# Patient Record
Sex: MALE | Employment: OTHER | URBAN - METROPOLITAN AREA
[De-identification: names, ages, dates, MRNs, and addresses within clinical notes are randomized per-mention and may not be internally consistent; named-entity substitution may affect disease eponyms.]

---

## 2023-10-17 ENCOUNTER — TELEPHONE (OUTPATIENT)
Dept: NEUROLOGY | Facility: CLINIC | Age: 68
End: 2023-10-17

## 2023-10-17 DIAGNOSIS — G20.A1 PARKINSON'S DISEASE WITHOUT DYSKINESIA OR FLUCTUATING MANIFESTATIONS (MULTI): Primary | ICD-10-CM

## 2023-10-19 RX ORDER — CARBIDOPA AND LEVODOPA 25; 250 MG/1; MG/1
1 TABLET ORAL 4 TIMES DAILY
COMMUNITY
End: 2023-10-19 | Stop reason: SDUPTHER

## 2023-10-19 RX ORDER — CARBIDOPA AND LEVODOPA 25; 250 MG/1; MG/1
1 TABLET ORAL
Qty: 540 TABLET | Refills: 1 | Status: SHIPPED | OUTPATIENT
Start: 2023-10-19 | End: 2024-04-16

## 2023-10-19 NOTE — TELEPHONE ENCOUNTER
1310 Pt reports carbi/levo is lasting only about four hours.  He takes it at 0600, 1000, 1400, 1830, 0000, and sometimes 0300 when he has to go to the bathroom.  Adjusted prescription to allow.    Chano Matthews Jr., M.D., FAAN

## 2023-12-07 ENCOUNTER — TELEPHONE (OUTPATIENT)
Dept: NEUROLOGY | Facility: CLINIC | Age: 68
End: 2023-12-07

## 2023-12-19 ENCOUNTER — TELEPHONE (OUTPATIENT)
Dept: NEUROLOGY | Facility: CLINIC | Age: 68
End: 2023-12-19

## 2023-12-19 DIAGNOSIS — G20.A1 PARKINSON'S DISEASE WITHOUT DYSKINESIA OR FLUCTUATING MANIFESTATIONS (MULTI): Primary | ICD-10-CM

## 2023-12-20 DIAGNOSIS — G20.A1 PARKINSON'S DISEASE WITHOUT DYSKINESIA OR FLUCTUATING MANIFESTATIONS (MULTI): ICD-10-CM

## 2023-12-20 RX ORDER — CARBIDOPA AND LEVODOPA 50; 200 MG/1; MG/1
1 TABLET, EXTENDED RELEASE ORAL 2 TIMES DAILY
Qty: 60 TABLET | Refills: 3 | Status: SHIPPED | OUTPATIENT
Start: 2023-12-20 | End: 2024-05-20 | Stop reason: ALTCHOICE

## 2023-12-26 RX ORDER — CARBIDOPA AND LEVODOPA 50; 200 MG/1; MG/1
TABLET, EXTENDED RELEASE ORAL
Qty: 180 TABLET | OUTPATIENT
Start: 2023-12-26

## 2024-04-13 DIAGNOSIS — G20.A1 PARKINSON'S DISEASE WITHOUT DYSKINESIA OR FLUCTUATING MANIFESTATIONS (MULTI): ICD-10-CM

## 2024-04-16 RX ORDER — CARBIDOPA AND LEVODOPA 25; 250 MG/1; MG/1
TABLET ORAL
Qty: 540 TABLET | Refills: 3 | Status: SHIPPED | OUTPATIENT
Start: 2024-04-16 | End: 2024-05-20 | Stop reason: SDUPTHER

## 2024-05-20 ENCOUNTER — OFFICE VISIT (OUTPATIENT)
Dept: NEUROLOGY | Facility: CLINIC | Age: 69
End: 2024-05-20
Payer: MEDICARE

## 2024-05-20 VITALS
HEART RATE: 74 BPM | BODY MASS INDEX: 25.61 KG/M2 | TEMPERATURE: 97.3 F | DIASTOLIC BLOOD PRESSURE: 69 MMHG | SYSTOLIC BLOOD PRESSURE: 121 MMHG | WEIGHT: 150 LBS | HEIGHT: 64 IN

## 2024-05-20 DIAGNOSIS — G20.A1 PARKINSON'S DISEASE WITHOUT DYSKINESIA OR FLUCTUATING MANIFESTATIONS (MULTI): Primary | ICD-10-CM

## 2024-05-20 PROCEDURE — 1159F MED LIST DOCD IN RCRD: CPT | Performed by: PSYCHIATRY & NEUROLOGY

## 2024-05-20 PROCEDURE — 99213 OFFICE O/P EST LOW 20 MIN: CPT | Performed by: PSYCHIATRY & NEUROLOGY

## 2024-05-20 PROCEDURE — 1036F TOBACCO NON-USER: CPT | Performed by: PSYCHIATRY & NEUROLOGY

## 2024-05-20 RX ORDER — ENTACAPONE 200 MG/1
200 TABLET ORAL 4 TIMES DAILY
Qty: 360 TABLET | Refills: 3 | Status: SHIPPED | OUTPATIENT
Start: 2024-05-20 | End: 2025-05-20

## 2024-05-20 RX ORDER — ATORVASTATIN CALCIUM 10 MG/1
TABLET, FILM COATED ORAL
COMMUNITY

## 2024-05-20 RX ORDER — CARBIDOPA AND LEVODOPA 25; 250 MG/1; MG/1
1 TABLET ORAL 4 TIMES DAILY
Qty: 360 TABLET | Refills: 3 | Status: SHIPPED | OUTPATIENT
Start: 2024-05-20 | End: 2025-05-20

## 2024-05-20 NOTE — PROGRESS NOTES
"NEUROLOGY OUTPATIENT FOLLOW-UP NOTE    Assessment/Plan   Diagnoses and all orders for this visit:  Parkinson's disease without dyskinesia or fluctuating manifestations (Multi)  -     Referral to Neurology; Future      IMPRESSION: Parkinson's disease.    PLAN:  I added entacapone 200 mg to the carbidopa/levodopa 25/250, four times daily.  I referred him to Dr. Kunz for an opinion as to whether he'd be a candidate for DBS.  I will see him again in 3-4 months or prn.      hCano Matthews Jr., M.D., FAAN   ----------    Subjective     Hayden Hester is a 68 y.o. year old male here for follow-up.  His wife accompanies him today.    Effect of medication lasts about three hours.  I tried changing the morning dose of carbidopa/levodopa to continuous release and he reported no additional effect from it, so he went back to the regular release carbidopa/levodopa.    He takes carbidopa/levodopa 25/250 at 0600 1000 1400 1800    He is wondering whether he is a candidate for DBS.    Past Medical History:   Diagnosis Date    Hyperlipidemia, unspecified     Diet-controlled hyperlipidemia     Past Surgical History:   Procedure Laterality Date    HERNIA REPAIR  08/08/2018    Inguinal Hernia Repair     Social History     Tobacco Use    Smoking status: Former     Types: Cigarettes    Smokeless tobacco: Never   Substance Use Topics    Alcohol use: Not Currently     family history is not on file.    Current Outpatient Medications:     atorvastatin (Lipitor) 10 mg tablet, Take by mouth., Disp: , Rfl:     carbidopa-levodopa (Sinemet CR)  mg ER tablet, Take 1 tablet by mouth 2 times a day. Do not crush or chew., Disp: 60 tablet, Rfl: 3    carbidopa-levodopa (Sinemet)  mg tablet, TAKE 1 TABLET BY MOUTH SIX TIMES A DAY, Disp: 540 tablet, Rfl: 3  No Known Allergies    Objective     /69   Pulse 74   Temp 36.3 °C (97.3 °F)   Ht 1.626 m (5' 4\")   Wt 68 kg (150 lb)   BMI 25.75 kg/m²     CONSTITUTIONAL:  No acute " distress    MENTAL STATUS:  Awake, alert, fully oriented to self, place, and time, with present short-term memory, good awareness of recent events, normal attention span, concentration, and fund of knowledge.    SPEECH AND LANGUAGE:  Can name and repeat, follows all commands, has no dysarthria    CRANIAL NERVES:  II-Vision present, visual fields full to confrontational testing    III/IV/VI--EOMs are present in all directions.  Pupils are symmetrically reactive in dim light.  No ptosis.    V--Normal facial sensation.  Mild jaw tremor.    VII--No facial asymmetry.  Minimal masked facies.    VIII--Hearing present to voice bilaterally.    IX/X--Symmetric soft palate rise.    XI--Normal trapezius power bilaterally.    XII--Tongue protrudes without deviation.    MOTOR:  Normal power and bulk in both arms and both legs.  Hypertonia of all four extremities.  Rest tremor of both arms, worse on the right.  Cogwheeling at both wrists.    SENSORY:  Normal pin sensation in both arms and both legs without distal-proximal gradient, asymmetry, or spinal sensory level.    COORDINATION:  Normal finger-to-nose and heel-to-shin testing in both arms and both legs.    REFLEXES are normal and symmetric at the biceps, triceps, brachioradialis, patella, and ankle.  The plantar responses are flexor.    GAIT is normal, without steppage, ataxia, shuffling, or spasticity.  He takes three steps to turn around.  He has increased right arm tremor when walking.      Chano Matthews Jr., M.D., FAAN

## 2024-05-29 ENCOUNTER — OFFICE VISIT (OUTPATIENT)
Dept: NEUROLOGY | Facility: CLINIC | Age: 69
End: 2024-05-29
Payer: MEDICARE

## 2024-05-29 VITALS
WEIGHT: 150.2 LBS | DIASTOLIC BLOOD PRESSURE: 81 MMHG | RESPIRATION RATE: 20 BRPM | BODY MASS INDEX: 23.57 KG/M2 | HEIGHT: 67 IN | TEMPERATURE: 97.5 F | SYSTOLIC BLOOD PRESSURE: 131 MMHG | HEART RATE: 63 BPM

## 2024-05-29 DIAGNOSIS — G20.A1 PARKINSON'S DISEASE WITHOUT DYSKINESIA OR FLUCTUATING MANIFESTATIONS (MULTI): ICD-10-CM

## 2024-05-29 PROCEDURE — 1036F TOBACCO NON-USER: CPT | Performed by: PSYCHIATRY & NEUROLOGY

## 2024-05-29 PROCEDURE — 1160F RVW MEDS BY RX/DR IN RCRD: CPT | Performed by: PSYCHIATRY & NEUROLOGY

## 2024-05-29 PROCEDURE — 99214 OFFICE O/P EST MOD 30 MIN: CPT | Performed by: PSYCHIATRY & NEUROLOGY

## 2024-05-29 PROCEDURE — 1159F MED LIST DOCD IN RCRD: CPT | Performed by: PSYCHIATRY & NEUROLOGY

## 2024-05-29 RX ORDER — CARBIDOPA AND LEVODOPA 25; 100 MG/1; MG/1
TABLET, EXTENDED RELEASE ORAL
Qty: 180 TABLET | Refills: 3 | Status: SHIPPED | OUTPATIENT
Start: 2024-05-29

## 2024-05-29 RX ORDER — LEVODOPA 42 MG/1
CAPSULE RESPIRATORY (INHALATION)
Qty: 60 CAPSULE | Refills: 3 | Status: SHIPPED | OUTPATIENT
Start: 2024-05-29

## 2024-05-29 ASSESSMENT — ENCOUNTER SYMPTOMS: OCCASIONAL FEELINGS OF UNSTEADINESS: 1

## 2024-05-29 NOTE — PATIENT INSTRUCTIONS
"It was a pleasure seeing you today.         For any urgent issues or needing to speak to a medical assistant please call [phone], option 6 during our office hours Monday-Friday 8am-4pm, and leave a voicemail with your concern.  My office will try to reach back you as soon as possible within 24 (business) hours.  If you have an emergency please call 911 or visit a local urgent care or nearest emergency room.      Please understand that [employer] is a useful communication tool for simple \"normal\" results or a refill request but I would not recommend using this tool for emergent or urgent issues or for conversations with me.  I am happy to ask my staff to rearrange a follow up visit or a virtual visit sooner than requested if appropriate for your care.    "

## 2024-05-29 NOTE — PROGRESS NOTES
"Consultation:   Subjective     Hayden Hester is a 69 y.o. year old right-handed male here for DBS consult/ parkinsonism.  Referred by Dr. Matthews.   HPI    Arrives with wife who provides history.  Right hand tremor started in 2015.  He is still bothered by his right hand tremor.    Current PD meds:  Sinemet 25-250mg 1 tab 6 x daily ( 6am/ 10am/ 2pm/ 6pm/10am/ 2am)  Entacapone 200mg 1 tab QID -recently added-not sure if it is helping.    Meds last 3-4 hours.  Sometimes feels the dose is not helping at all for tremor.   Walking is an issue.  He has fallen, while walking upstairs not   Memory is good.  No hallucinations.  Mild dyskinesia in legs but do not always occur after levodopa versus pff time  Has freezing of gait at times.   His voice is soft.  No trouble swallowing.    No FH of tremor or PD.       Past meds :   Ropinirole - helped slightly in 2018, changed to levodopa in 2019.   Review of Systems    There is no problem list on file for this patient.    Past Medical History:   Diagnosis Date    Hyperlipidemia, unspecified     Diet-controlled hyperlipidemia     Past Surgical History:   Procedure Laterality Date    HERNIA REPAIR  08/08/2018    Inguinal Hernia Repair     Social History     Tobacco Use    Smoking status: Former     Types: Cigarettes    Smokeless tobacco: Never   Substance Use Topics    Alcohol use: Not Currently     family history is not on file.    Current Outpatient Medications:     atorvastatin (Lipitor) 10 mg tablet, Take by mouth., Disp: , Rfl:     carbidopa-levodopa (Sinemet)  mg tablet, Take 1 tablet by mouth 4 times a day., Disp: 360 tablet, Rfl: 3    entacapone (Comtan) 200 mg tablet, Take 1 tablet (200 mg) by mouth 4 times a day. Take with carbidopa/levodopa, Disp: 360 tablet, Rfl: 3  No Known Allergies  /81 (BP Location: Right arm)   Pulse 63   Temp 36.4 °C (97.5 °F)   Resp 20   Ht 1.702 m (5' 7\")   Wt 68.1 kg (150 lb 3.2 oz)   BMI 23.52 kg/m²   Neurological " Exam/Physical Exam:    Constitutional: General appearance: no acute distress. Pleasant.   Auscultation of Heart: Regular rate and rhythm, no murmurs, normal S1 and S2.   Carotid Arteries: no bruits  Peripheral Vascular Exam: No swelling, edema or tenderness to palpation in extremities  Mental status: no distress, alert, interactive and cooperative. Affect is appropriate.   Mild hypophonia  Orientation: oriented to person, oriented to place and oriented to time.   Memory: recent memory intact and remote memory intact.   Attention: normal attention /concentration.   Eyes: The ophthalmoscopic examination was normal.   Cranial nerve II: Visual fields full to confrontation.   Cranial nerves III, IV, and VI: Pupils round, equally reactive to light; EOMs intact. No nystagmus.   Cranial Nerve V: Facial sensation intact to LT bilaterally.   Cranial nerve VII: no facial droop  Cranial nerve VIII: Hearing is intact  Cranial nerves IX and X: Palate elevates symmetrically.   Cranial nerve XI: Shoulder shrug intact.   Cranial nerve XII: Tongue is midline.  Motor:  Muscle bulk was normal in both upper and lower extremities.    No atrophy.   Strength is normal.  Resting tremor of the RUE- moderate.  There is severe bradykinesia on the right side and moderate bradykinesia on left.  He has mild rigidity in all ext.  Deep Tendon Reflexes: left biceps 2+ , right biceps 2+, left triceps 2+, right triceps 2+, left brachioradialis 2+, right brachioradialis 2+, left patella 2+, right patella 2+, left ankle jerk 2+, right ankle jerk 2+   Plantar Reflex: Toes downgoing to plantar stimulation on the left. Toes downgoing to plantar stimulation on the right.   Sensory Exam: Normal to vibratory sensation  Coordination:  no limb dystaxia  Gait:  cautious.  Scoliosis and mild anterocollis.  Right leg slightly dragging.  Slow initiation of gait.  Right arm swing decreased with R hand tremor present.          Assessment/Plan   Problem List Items  Addressed This Visit    None  Visit Diagnoses         Codes    Parkinson's disease without dyskinesia or fluctuating manifestations (Multi)     G20.A1        Would be a good candidate b/l STN DBS, help tremor, motor fluctuations, dyskinesia.  Will have him set up for DBS eval/ appointments.    Stop the 10pm Sinemet and 2am dose of levodopa and trial Sinemet 25-100mg ER take 1 tab at bedtime ( 10pm) -I would recommend doing that for 2 weeks and if not helping he can increase  Inbrija is an option -will try this now for off phases vs. Changing Sinemet to Rytary.

## 2024-06-18 NOTE — PROGRESS NOTES
Subjective     Hayden Hester is a 69 y.o. year old male who presents with Parkinson's Disease, here for follow up visit.    HPI  Patient is here for DBS educational visit for PD, he is a patient of Dr. Wilson.    He comes with his wife Li.                        Current Outpatient Medications:     atorvastatin (Lipitor) 10 mg tablet, Take by mouth., Disp: , Rfl:     carbidopa-levodopa (Sinemet CR)  mg ER tablet, Take 1 tab at 10pm bedtime x 2 weeks if needed take 2 tabs at bedtime, Disp: 180 tablet, Rfl: 3    carbidopa-levodopa (Sinemet)  mg tablet, Take 1 tablet by mouth 4 times a day., Disp: 360 tablet, Rfl: 3    entacapone (Comtan) 200 mg tablet, Take 1 tablet (200 mg) by mouth 4 times a day. Take with carbidopa/levodopa, Disp: 360 tablet, Rfl: 3    levodopa (Inbrija) 42 mg capsule, w/inhalation device, Take 2 caps inhalation prn for off phases up to 5 times daily, Disp: 60 capsule, Rfl: 3       Objective   There were no vitals filed for this visit.              Physical Exam  Awake, alert, asking questions appropriately.     Assessment/Plan   Mr. Hayden Hester is a 69 y.o. M with Parkinson;s disease who presented for Deep Brain Stimulation educational visit. Today we reviewed a PowerPoint on DBS to discuss risks, benefits, treatment plan and followup required that may take 6 to 12 months to achieve stable benefit, also showed images of the lead and battery, discussed the 3 types of systems available and allowed the patient to ask any questions-summary in patient AVS. All questions were answered.    Diagnoses and all orders for this visit:  Parkinson's disease without dyskinesia or fluctuating manifestations (Multi)  -     Referral to Neurosurgery; Future  -     Referral to Neuropsychology; Future        #Consult to neuropsychology for memory testing and neurosurgery for DBS eval    Deep Brain Stimulation (DBS) helps treat the motor symptoms of Parkinson's disease (including tremor, slowness,  stiffness, motor fluctuations).We discussed freezing of gait may not respond, or only temporarily, as the disease progresses can worsen. It can also help with night time mobility as the therapy is on 24 hours a day, and therefore can help with sleep. It does not treat non-motor symptoms (such as anxiety, depression, urinary issues, sleep problems, cognitive decline, balance problems, speech, swallowing, etc). In fact, it can worsen balance/falls, cognition/memory/thinking, and speech - if these are major issues, then DBS may not be the best treatment. After DBS surgery, people are usually able to reduce (though not eliminate) their Parkinson's medications. It may take up to 6-12 months to optimize DBS settings after surgery.    We discussed possible risks including stroke and infection in the will-operative period, but please plan to discuss these risks in more detail with our neurosurgeon. Reversible stimulation induced side effects include tingling and pulling from spread of stimulation to surrounding areas. We discussed possible cognitive side effects including word finding difficulties as well as executive dysfunction (attention,recall, multitasking).    We discussed that DBS is not a cure, but helps the motor symptoms, and we also discussed that it is an on going therapy with frequent visits.    -The DBS will help with: slowness, stiffness, tremor, shuffling, motor fluctuations (due to medications wearing off), dyskinesias (wiggly movements)  -The DBS will NOT help with: cognition, urinary issues, mood, hallucinations, and certain midline symptoms such as postural instability, balance problems, freezing of gait, speech       There are the steps needed prior to getting DBS (in no particular order):  Neuropsychological testing (several hours of cognitive testing to evaluate for mild cognitive impairment or dementia)  Levodopa response testing in clinic (neurologic exam 12hrs OFF of Parkinson's meds and 1hr after  taking Parkinson's medications)  Consultation with the neurosurgeon     After above is completed, your case will be discussed at our bi-monthly DBS meeting and we will let you know if you are a candidate and set up the steps if you would like to proceed.         More information about DBS can be found online at:   - www.parkinson.org/sites/default/files/Guide_to_DBS_Stimulation_Therapy.pdf   - www.ninds.nih.gov/Disorders/All-Disorders/Deep-Brain-Stimulation-Parkinsons-Disease-Information-Page     #Follow up for levodopa response testing. Come AT LEAST 12 hrs off of all Parkinson's medication (OK to take other meds), and bring your medications with you (I may have you take higher dose so please bring extra).   You will be assessed OFF medication and then you will take your meds in the office and will be re-assessed 1hr later to see how you respond.     Also need new patient apt for DBS eval with Dr. Saenz here at [facility]---our office will reach out.     I, MADDISON Mckeon, personally performed discussion of care/treatment options reviewing a powerpoint on DBS as above, taking a total time of 41 minutes for today's visit.    Young Mckeon NP-C  Adult/Gerontological Nurse Practitioner   [facility], Department of Neurology  Neurological [facility]  [address] [place] [address]  Phone: [phone]  Fax: [phone]

## 2024-06-19 ENCOUNTER — APPOINTMENT (OUTPATIENT)
Dept: NEUROLOGY | Facility: CLINIC | Age: 69
End: 2024-06-19
Payer: MEDICARE

## 2024-06-19 DIAGNOSIS — G20.A1 PARKINSON'S DISEASE WITHOUT DYSKINESIA OR FLUCTUATING MANIFESTATIONS (MULTI): Primary | ICD-10-CM

## 2024-06-19 PROCEDURE — 1160F RVW MEDS BY RX/DR IN RCRD: CPT | Performed by: NURSE PRACTITIONER

## 2024-06-19 PROCEDURE — 1036F TOBACCO NON-USER: CPT | Performed by: NURSE PRACTITIONER

## 2024-06-19 PROCEDURE — 99215 OFFICE O/P EST HI 40 MIN: CPT | Performed by: NURSE PRACTITIONER

## 2024-06-19 PROCEDURE — 1159F MED LIST DOCD IN RCRD: CPT | Performed by: NURSE PRACTITIONER

## 2024-06-19 NOTE — PATIENT INSTRUCTIONS
#Consult to neuropsychology for memory testing and neurosurgery for DBS eval    Deep Brain Stimulation (DBS) helps treat the motor symptoms of Parkinson's disease (including tremor, slowness, stiffness, motor fluctuations).We discussed freezing of gait may not respond, or only temporarily, as the disease progresses can worsen. It can also help with night time mobility as the therapy is on 24 hours a day, and therefore can help with sleep. It does not treat non-motor symptoms (such as anxiety, depression, urinary issues, sleep problems, cognitive decline, balance problems, speech, swallowing, etc). In fact, it can worsen balance/falls, cognition/memory/thinking, and speech - if these are major issues, then DBS may not be the best treatment. After DBS surgery, people are usually able to reduce (though not eliminate) their Parkinson's medications. It may take up to 6-12 months to optimize DBS settings after surgery.    We discussed possible risks including stroke and infection in the will-operative period, but please plan to discuss these risks in more detail with our neurosurgeon. Reversible stimulation induced side effects include tingling and pulling from spread of stimulation to surrounding areas. We discussed possible cognitive side effects including word finding difficulties as well as executive dysfunction (attention,recall, multitasking).    We discussed that DBS is not a cure, but helps the motor symptoms, and we also discussed that it is an on going therapy with frequent visits.    -The DBS will help with: slowness, stiffness, tremor, shuffling, motor fluctuations (due to medications wearing off), dyskinesias (wiggly movements)  -The DBS will NOT help with: cognition, urinary issues, mood, hallucinations, and certain midline symptoms such as postural instability, balance problems, freezing of gait, speech       There are the steps needed prior to getting DBS (in no particular order):  Neuropsychological  testing (several hours of cognitive testing to evaluate for mild cognitive impairment or dementia)  Levodopa response testing in clinic (neurologic exam 12hrs OFF of Parkinson's meds and 1hr after taking Parkinson's medications)  Consultation with the neurosurgeon     After above is completed, your case will be discussed at our bi-monthly DBS meeting and we will let you know if you are a candidate and set up the steps if you would like to proceed.         More information about DBS can be found online at:   - www.parkinson.org/sites/default/files/Guide_to_DBS_Stimulation_Therapy.pdf   - www.ninds.nih.gov/Disorders/All-Disorders/Deep-Brain-Stimulation-Parkinsons-Disease-Information-Page     #Follow up for levodopa response testing. Come AT LEAST 12 hrs off of all Parkinson's medication (OK to take other meds), and bring your medications with you (I may have you take higher dose so please bring extra).   You will be assessed OFF medication and then you will take your meds in the office and will be re-assessed 1hr later to see how you respond.     Also need new patient apt for DBS eval with Dr. Saenz here at [facility]---our office will reach out.       Young Mckeon, NP-C  Adult/Gerontological Nurse Practitioner   [facility], Department of Neurology  Neurological [facility]  [address] [place] [address]  Phone: [phone]  Fax: [phone]

## 2024-06-25 ENCOUNTER — TELEPHONE (OUTPATIENT)
Dept: NEUROSURGERY | Facility: HOSPITAL | Age: 69
End: 2024-06-25
Payer: MEDICARE

## 2024-06-25 NOTE — TELEPHONE ENCOUNTER
Called ptEDELMIRA on VM that we have a cancellation for tomorrow morning at 8am but would need a call back asap to secure that appt.  Othewise, please call back to schedule a future appt.  Left office # to call back.

## 2024-06-27 NOTE — PROGRESS NOTES
[facility]   Neurosurgery    Diagnosis  {There are no diagnoses linked to this encounter. (Refresh or delete this SmartLink)}    Patient Discussion/Summary      Provider Impressions      History of Present Illness  Chief Complaint: No chief complaint on file.        HPI: Hayden Hester is a 69 y.o. male ***      ROS: A complete 11 system ROS was performed (constitutional, eyes, ENT, cardiovascular, respiratory, GI, , musculoskeletal, skin, neurological, and psychiatric) and was negative aside from the pertinent positives and negatives noted in the HPI.      Previous History  Past Medical History:   Diagnosis Date    Hyperlipidemia, unspecified     Diet-controlled hyperlipidemia     Past Surgical History:   Procedure Laterality Date    HERNIA REPAIR  08/08/2018    Inguinal Hernia Repair     Social History     Tobacco Use    Smoking status: Former     Types: Cigarettes    Smokeless tobacco: Never   Vaping Use    Vaping status: Never Used   Substance Use Topics    Alcohol use: Not Currently    Drug use: Never     No family history on file.  No Known Allergies  Current Outpatient Medications   Medication Instructions    atorvastatin (Lipitor) 10 mg tablet oral    carbidopa-levodopa (Sinemet CR)  mg ER tablet Take 1 tab at 10pm bedtime x 2 weeks if needed take 2 tabs at bedtime    carbidopa-levodopa (Sinemet)  mg tablet 1 tablet, oral, 4 times daily    entacapone (COMTAN) 200 mg, oral, 4 times daily, Take with carbidopa/levodopa    levodopa (Inbrija) 42 mg capsule, w/inhalation device Take 2 caps inhalation prn for off phases up to 5 times daily         Vitals  There were no vitals taken for this visit.      Physical Exam  Constitutional: Well appearing. No acute distress.   Musculoskeletal: No visible deformity of joints and nails. Normal ROM.  Orientation: Oriented to self, place, and time  Language: Fluid speech and intact cognition  CN 2: Normal   CN 3, 4, and 6: Normal   CN 5: Normal   CN 7:  Normal   CN 8: Normal   CN 9 and 10: Normal   CN 11: Normal   CN 12: Normal   Muscle strength: 5/5 strength both UE and LE.  Muscle tone: No atrophy, abnormal movements, flaccidity, cogwheeling or spasticity.   Gait and station: Non-antalgic and not broad-based. No shuffling steps.  Sensation: Grossly intact throughout all dermatomes. No allodynia.   Reflexes: Symmetric and normal deep tendon reflexes throughout. Negative Moise's sign. No clonus at ankles.    Coordination: No dysmetria.  Mood and Affect: Appropriate mood and affect.   Involuntary Movements:   Radicular Testing:       Results

## 2024-06-28 ENCOUNTER — OFFICE VISIT (OUTPATIENT)
Dept: NEUROSURGERY | Facility: CLINIC | Age: 69
End: 2024-06-28
Payer: MEDICARE

## 2024-06-28 ENCOUNTER — APPOINTMENT (OUTPATIENT)
Dept: NEUROSURGERY | Facility: CLINIC | Age: 69
End: 2024-06-28
Payer: MEDICARE

## 2024-06-28 VITALS
WEIGHT: 160 LBS | HEIGHT: 67 IN | TEMPERATURE: 97.5 F | BODY MASS INDEX: 25.11 KG/M2 | DIASTOLIC BLOOD PRESSURE: 80 MMHG | SYSTOLIC BLOOD PRESSURE: 128 MMHG | HEART RATE: 74 BPM

## 2024-06-28 DIAGNOSIS — G20.B2 PARKINSON'S DISEASE WITH DYSKINESIA AND FLUCTUATING MANIFESTATIONS (MULTI): Primary | ICD-10-CM

## 2024-06-28 PROCEDURE — 1036F TOBACCO NON-USER: CPT | Performed by: NEUROLOGICAL SURGERY

## 2024-06-28 PROCEDURE — 99205 OFFICE O/P NEW HI 60 MIN: CPT | Performed by: NEUROLOGICAL SURGERY

## 2024-06-28 PROCEDURE — 1159F MED LIST DOCD IN RCRD: CPT | Performed by: NEUROLOGICAL SURGERY

## 2024-06-28 PROCEDURE — 99215 OFFICE O/P EST HI 40 MIN: CPT | Performed by: NEUROLOGICAL SURGERY

## 2024-06-28 PROCEDURE — 1126F AMNT PAIN NOTED NONE PRSNT: CPT | Performed by: NEUROLOGICAL SURGERY

## 2024-06-28 RX ORDER — LORAZEPAM 1 MG/1
TABLET ORAL
Qty: 2 TABLET | Refills: 0 | Status: SHIPPED | OUTPATIENT
Start: 2024-06-28

## 2024-06-28 ASSESSMENT — PAIN SCALES - GENERAL: PAINLEVEL: 0-NO PAIN

## 2024-06-28 NOTE — PROGRESS NOTES
[facility]   Neurosurgery    Diagnosis  Hayden was seen today for new patient visit.  Diagnoses and all orders for this visit:  Parkinson's disease without dyskinesia or fluctuating manifestations (Multi)  -     Referral to Neurosurgery      Patient Discussion/Summary  1) We discussed Deep brain stimulation (DBS) surgery at length, as well as the associated risks and benefits. We discussed the different methods of performing the surgery, as well as the different types of devices commonly used. We also discussed surgical targets in the brain with significant evidence supporting their efficacy.    2) Prior to surgery, you will need a neuropsychology evaluation, levadopa response testing, and a volumetric MRI, which needs to be done at either [facility] or [facility]. You may need to take a dose of Ativan prior to the MRI to minimize head/body movement.     3) Your case will then be presented at our multi-disciplinary care conference. At this conference, our team of neurologists, neurosurgeons, neuropsychologists, and advanced practice nurses will review and discuss the levodopa response testing, neuropsychological results, neurological evaluations, and the high resolution structural MRI. A treatment plan will be developed and tailored to your specific needs. You will subsequently be notified of the recommendations of the group.    4) If we decide to proceed to surgery, we will also need a volumetric head CT and an evaluation by our will-operative anesthesia team.    5) For more information on DBS in general, please refer to the National Parkinson Foundation website, www.parkinson.org, which has a booklet of information on the surgery. Similar information can also be found on the Parkinson Disease Foundation website.     Provider Impressions  70yo R-handed M with PD, sx of RUE tremor, bradykinesia, rigidity, and motor fluctuations. Good candidate for DBS, likely STN. Still needs LRT and  NP.     History of Present Illness  Chief Complaint:   Chief Complaint   Patient presents with    New Patient Visit         HPI: Hayden Hester is a 69 y.o. male right-handed M with PD since 2015, with symptoms primarily of tremor in right hand, as well as dyskinesias, motor fluctuations, and gait instability; referred by Dr. Kunz and MADDISON Vidal, for neurosurgical evaluation and to discuss DBS surgery, consider b/l STN DBS. Patient is scheduled for LRT with Young on 8/12, and for neuropsychology with Dr. Sandoval on 8/19.      Patient presents today with his wife. Patient reports first noticing right hand tremor ~2016/2017 while he was working on his faucet. Patient lived with this for a couple of months, but was then referred to Dr. Matthews where he was diagnosed with PD and initiated on medication. Patient reports that the first medication was not helpful, but was then switched to C/L with better results. Patient reports he does well with medication, but is experiencing increasing wearing off between doses. Patient's wife also recently noted occas mild dyskinesias in the b/l legs while on dosing, which has been over the last year. Patient is also bothered by bradykinesia, shuffling gait, and difficulty turning, which is progressively worsening. Patient denies imbalance or fear of falls. Patient denies difficulty swallowing or GI issues. Patient denies any significant history of anxiety or depression.     Goals for surgery, patient primarily would like to address tremor, and would also like to improve movement/bradykinesia.       ROS: A complete 11 system ROS was performed (constitutional, eyes, ENT, cardiovascular, respiratory, GI, , musculoskeletal, skin, neurological, and psychiatric) and was negative aside from the pertinent positives and negatives noted in the HPI.      Previous History  Past Medical History:   Diagnosis Date    Hyperlipidemia, unspecified     Diet-controlled hyperlipidemia     Past Surgical  "History:   Procedure Laterality Date    HERNIA REPAIR  08/08/2018    Inguinal Hernia Repair     Social History     Tobacco Use    Smoking status: Former     Types: Cigarettes    Smokeless tobacco: Never   Vaping Use    Vaping status: Never Used   Substance Use Topics    Alcohol use: Not Currently    Drug use: Never     No family history on file.  No Known Allergies  Current Outpatient Medications   Medication Instructions    atorvastatin (Lipitor) 10 mg tablet oral    carbidopa-levodopa (Sinemet CR)  mg ER tablet Take 1 tab at 10pm bedtime x 2 weeks if needed take 2 tabs at bedtime    carbidopa-levodopa (Sinemet)  mg tablet 1 tablet, oral, 4 times daily    entacapone (COMTAN) 200 mg, oral, 4 times daily, Take with carbidopa/levodopa    levodopa (Inbrija) 42 mg capsule, w/inhalation device Take 2 caps inhalation prn for off phases up to 5 times daily         Vitals  /80   Pulse 74   Temp 36.4 °C (97.5 °F)   Ht 1.702 m (5' 7\")   Wt 72.6 kg (160 lb)   BMI 25.06 kg/m²       Physical Exam  Constitutional: Well appearing. No acute distress.   Musculoskeletal: No visible deformity of joints and nails. Normal ROM.  Orientation: Oriented to self, place, and time  Language: Fluid speech and intact cognition  CN 2: Normal   CN 3, 4, and 6: Normal   CN 5: Normal   CN 7: Normal   CN 8: Normal   CN 9 and 10: Normal   CN 11: Normal   CN 12: Normal   Muscle strength: 5/5 strength both UE and LE.  Muscle tone: Rigidity RUE>LUE, no notable rigidity in LE.  Gait and station: Mild shuffling and stooped gait. Absent right arm swing (patient between dosing and has not yet taken 11am dose).  Sensation: Grossly intact throughout all dermatomes. No allodynia.   Reflexes: Symmetric and normal deep tendon reflexes throughout. Negative Moise's sign. No clonus at ankles.    Coordination: Diminished finger taps and fine motor coordination.   Mood and Affect: Appropriate mood and affect.   Involuntary Movements: Notable " resting R hand tremor and moderate R action tremor; mild left hand tremor with action, chin tremor. No dyskinesias noted on exam (patient between dosing and had not yet taken 11am dose).        Results

## 2024-07-23 ENCOUNTER — TELEPHONE (OUTPATIENT)
Dept: PSYCHOLOGY | Facility: CLINIC | Age: 69
End: 2024-07-23
Payer: MEDICARE

## 2024-08-12 ENCOUNTER — APPOINTMENT (OUTPATIENT)
Dept: NEUROLOGY | Facility: CLINIC | Age: 69
End: 2024-08-12
Payer: MEDICARE

## 2024-08-12 DIAGNOSIS — G20.B2 PARKINSON'S DISEASE WITH DYSKINESIA AND FLUCTUATING MANIFESTATIONS (MULTI): Primary | ICD-10-CM

## 2024-08-12 PROCEDURE — 99214 OFFICE O/P EST MOD 30 MIN: CPT | Performed by: NURSE PRACTITIONER

## 2024-08-12 NOTE — PROGRESS NOTES
"Subjective     Hayden Hester is a 69 y.o. year old male who presents with Parkinson's Disease, here for follow up visit.    HPI  Patient is here for LRT and could not tolerate being off meds from 8pm so took meds 2 hrs ago (545am). Took 30mins to get to the bathroom, very unsafe, a lot of FOG, 5 mins to even turn.     He comes with his wife Li.       MOTOR SYMPTOMS      +/-                            Comments  Motor sx overall  Slowly worsening   Tremor + Main issue in R hand, some jaw   Rigidity +    Bradykinesia +    Gait difficulty + Slow to get up and falls back into chair the past year or more. Shuffling when off.   FOG + When meds wear off   Falls + Once in May carrying a laptop and glass of water in his hand   PT -    Exercise -             [facility] Meds  Med Dose Time   Carbidopa-levodopa 25-250mg 1 tab 6-7x/day Q3hrs during the day and 4hrs at night   Entacapone 200mg  1 tab 3 times a day    Carbidopa-levodopa CR 25/100mg  1 tab sy bedtime 10pm   Latency 45mins    Wearing off A little less [id number]    Side effects     Dyskinesia Very slightly in legs sometimes    Hallucinations Never    Impulse control Never     Sudden sleep Never unless in a car as a passenger    Other None      Prior medications:  Ropinirole - helped slightly in 2018, changed to levodopa in 2019    Sunil Scan: Never                                                                                       DBS Candidacy  Why patient wants DBS \"To help the movement and motor\"   Most bothersome symptom Tremor and walking   Onset symptoms 2016-very slight RUE tremor   PD diagnosis date  2018   Sx worse on R or L side R side   Onset of motor fluctuations/dyskinesias Dyskinesia within the past yr, more wearing off the past year too   Medication history As above   Gait impairment Past year   FOG >1yr   Hallucinations Never   Impulsivity/ICD Never   Mood history: Denies issues   Cognitive decline: Denies issues.    Caregiver support " "Wife   Additional medical issues Denies   Neuropsychologic test Pending next week   Mvt d/o specialist  Pending apt with Dr. Saenz 9/18/24   Neurosurgical consult Dr. Fry 6/28/24 \"68yo R-handed M with PD, sx of RUE tremor, bradykinesia, rigidity, and motor fluctuations. Good candidate for DBS, likely STN. Still needs LRT and NP\"                            Current Outpatient Medications:     atorvastatin (Lipitor) 10 mg tablet, Take by mouth., Disp: , Rfl:     carbidopa-levodopa (Sinemet CR)  mg ER tablet, Take 1 tab at 10pm bedtime x 2 weeks if needed take 2 tabs at bedtime, Disp: 180 tablet, Rfl: 3    carbidopa-levodopa (Sinemet)  mg tablet, Take 1 tablet by mouth 4 times a day., Disp: 360 tablet, Rfl: 3    entacapone (Comtan) 200 mg tablet, Take 1 tablet (200 mg) by mouth 4 times a day. Take with carbidopa/levodopa, Disp: 360 tablet, Rfl: 3    levodopa (Inbrija) 42 mg capsule, w/inhalation device, Take 2 caps inhalation prn for off phases up to 5 times daily, Disp: 60 capsule, Rfl: 3    LORazepam (Ativan) 1 mg tablet, Take 1 tablet 1 hour prior to your MRI. May repeat 15-30 minutes prior to your MRI., Disp: 2 tablet, Rfl: 0       Objective   There were no vitals filed for this visit.              Physical Exam  Awake, alert, asking questions appropriately.     Assessment/Plan   Mr. Hayden Hester is a 69 y.o. M with Parkinson;s disease who presented for LRT today, but he came on meds today as he was unable to walk safely to get in here off. Plan to come back in and have patient be at least 4hrs off meds for LRT.     We reviewed PD history today, discussed DBS some more, he is aware it will not help with balance/FOG.       Diagnoses and all orders for this visit:  Parkinson's disease with dyskinesia and fluctuating manifestations (Multi)      #Apts as planned for NP and Dr. Saenz    #Follow up for LRT- come 3 hrs off meds, will wait 1hr here when he wears off more then will do LRT      I, NP " Mike, personally performed discussion of care/treatment options as above, taking a total time of 32 minutes for today's visit.    Young Mckeon, NP-C  Adult/Gerontological Nurse Practitioner   [facility], Department of Neurology  [facility] [place]  [facility]  [address] [id number]  Phone: [phone]  Fax: [phone]

## 2024-08-19 ENCOUNTER — PROCEDURE VISIT (OUTPATIENT)
Dept: PSYCHOLOGY | Facility: HOSPITAL | Age: 69
End: 2024-08-19
Payer: MEDICARE

## 2024-08-19 ENCOUNTER — APPOINTMENT (OUTPATIENT)
Dept: PSYCHOLOGY | Facility: CLINIC | Age: 69
End: 2024-08-19
Payer: MEDICARE

## 2024-08-19 DIAGNOSIS — R41.3 MEMORY CHANGES: Primary | ICD-10-CM

## 2024-08-19 PROCEDURE — 99211NT NEUROPYSCH TESTING PENDING FINAL BILLING: Performed by: CLINICAL NEUROPSYCHOLOGIST

## 2024-09-11 ENCOUNTER — APPOINTMENT (OUTPATIENT)
Dept: NEUROLOGY | Facility: CLINIC | Age: 69
End: 2024-09-11
Payer: MEDICARE

## 2024-09-11 VITALS
WEIGHT: 142 LBS | DIASTOLIC BLOOD PRESSURE: 84 MMHG | BODY MASS INDEX: 22.24 KG/M2 | SYSTOLIC BLOOD PRESSURE: 142 MMHG | HEART RATE: 73 BPM | RESPIRATION RATE: 16 BRPM

## 2024-09-11 DIAGNOSIS — G20.B2 PARKINSON'S DISEASE WITH DYSKINESIA AND FLUCTUATING MANIFESTATIONS (MULTI): Primary | ICD-10-CM

## 2024-09-11 PROCEDURE — 99213 OFFICE O/P EST LOW 20 MIN: CPT | Performed by: NURSE PRACTITIONER

## 2024-09-11 ASSESSMENT — UNIFIED PARKINSONS DISEASE RATING SCALE (UPDRS)
PARKINSONS_MEDS: YES
TOETAPPING_LEFT: 3
SPEECH: 2
RIGIDITY_RLE: 0
RIGIDITY_LUE: 2
LEG_AGILITY_LEFT: 1
LEG_AGILITY_RIGHT: 0
RIGIDITY_RLE: 0
FREEZING_GAIT: 1
RIGIDITY_LUE: 2
SPONTANEITY_OF_MOVEMENT: 2
FACIAL_EXPRESSION: 2
CHAIR_RISING_SCALE: 1
TOETAPPING_RIGHT: 1
CLINICAL_STATE: ON
POSTURAL_STABILITY: 3
LEVODOPA: YES
AMPLITUDE_LUE: 0
FACIAL_EXPRESSION: 2
TOTAL_SCORE: 44
HANDMOVEMENTS_RIGHT: 0
TOTAL_SCORE: 30
CONSTANCY_TREMOR_ATREST: 3
CLINICAL_STATE: OFF
AMPLITUDE_RUE: 2
LEVODOPA: YES
AMPLITUDE_LIP_JAW: 0
SPONTANEITY_OF_MOVEMENT: 3
RIGIDITY_LLE: 0
KINETIC_TREMOR_LEFTHAND: 0
AMPLITUDE_LLE: 0
FINGER_TAPPING_RIGHT: 0
RIGIDITY_RUE: 2
POSTURE: 3
HANDMOVEMENTS_RIGHT: 1
PRONATION_SUPINATION_RIGHT: 1
AMPLITUDE_RLE: 1
PRONATION_SUPINATION_LEFT: 2
PARKINSONS_MEDS: YES
FREEZING_GAIT: 0
FINGER_TAPPING_RIGHT: 1
POSTURE: 3
AMPLITUDE_RUE: 0
POSTURAL_STABILITY: 3
LEG_AGILITY_RIGHT: 1
PRONATION_SUPINATION_RIGHT: 2
FINGER_TAPPING_LEFT: 1
LEG_AGILITY_LEFT: 1
CHAIR_RISING_SCALE: 2
AMPLITUDE_LIP_JAW: 0
AMPLITUDE_LLE: 0
TOETAPPING_RIGHT: 0
TOETAPPING_LEFT: 1
CONSTANCY_TREMOR_ATREST: 0
DYSKINESIAS_PRESENT: YES
GAIT: 2
MOVEMENTS_INTERFERE_WITH_RATINGS: NO
RIGIDITY_NECK: 1
RIGIDITY_NECK: 2
FINGER_TAPPING_LEFT: 1
POSTURAL_TREMOR_RIGHTHAND: 1
POSTURAL_TREMOR_RIGHTHAND: 1
RIGIDITY_LLE: 0
AMPLITUDE_LUE: 0
POSTURAL_TREMOR_LEFTHAND: 0
KINETIC_TREMOR_RIGHTHAND: 1
GAIT: 2
SPEECH: 2
DYSKINESIAS_PRESENT: NO
KINETIC_TREMOR_LEFTHAND: 1
RIGIDITY_RUE: 2
MINUTES_SINCE_LEVODOPA: 4.5HRS
PRONATION_SUPINATION_LEFT: 1
KINETIC_TREMOR_RIGHTHAND: 0
POSTURAL_TREMOR_LEFTHAND: 0
AMPLITUDE_RLE: 0

## 2024-09-11 ASSESSMENT — PATIENT HEALTH QUESTIONNAIRE - PHQ9
1. LITTLE INTEREST OR PLEASURE IN DOING THINGS: NOT AT ALL
SUM OF ALL RESPONSES TO PHQ9 QUESTIONS 1 AND 2: 0
2. FEELING DOWN, DEPRESSED OR HOPELESS: NOT AT ALL

## 2024-09-11 ASSESSMENT — ENCOUNTER SYMPTOMS
DEPRESSION: 0
OCCASIONAL FEELINGS OF UNSTEADINESS: 1
LOSS OF SENSATION IN FEET: 0

## 2024-09-11 NOTE — PROGRESS NOTES
Subjective     Hayden Hester is a 69 y.o. year old male who presents with No chief complaint on file., here for follow up visit.    HPI  See more detailed DBS workup from note 8/12/24.   Patient is here for LRT since last visit was on meds--last dose was 4.5hrs--will take 2 tabs as 1 tab usually helps well.     He comes with his wife Li. Kody falls or changes since visit last month.     NP testing done, will have feedback via telehealth tomorrow.             [facility] Meds  Med Dose Time   Carbidopa-levodopa 25-250mg 1 tab 6-7x/day Q3hrs during the day and 4hrs at night   Entacapone 200mg  1 tab 3 times a day    Carbidopa-levodopa CR 25/100mg  1 tab at bedtime 10pm   Latency 45mins    Wearing off A little less [id number]    Side effects     Dyskinesia Very slightly in legs sometimes    Hallucinations Never    Impulse control Never     Sudden sleep Never unless in a car as a passenger    Other None      Prior medications:  Ropinirole - helped slightly in 2018, changed to levodopa in 2019      Patient Health Questionnaire-2 Score: 0            Current Outpatient Medications:     atorvastatin (Lipitor) 10 mg tablet, Take by mouth., Disp: , Rfl:     carbidopa-levodopa (Sinemet CR)  mg ER tablet, Take 1 tab at 10pm bedtime x 2 weeks if needed take 2 tabs at bedtime, Disp: 180 tablet, Rfl: 3    carbidopa-levodopa (Sinemet)  mg tablet, Take 1 tablet by mouth 4 times a day., Disp: 360 tablet, Rfl: 3    entacapone (Comtan) 200 mg tablet, Take 1 tablet (200 mg) by mouth 4 times a day. Take with carbidopa/levodopa, Disp: 360 tablet, Rfl: 3    levodopa (Inbrija) 42 mg capsule, w/inhalation device, Take 2 caps inhalation prn for off phases up to 5 times daily, Disp: 60 capsule, Rfl: 3    LORazepam (Ativan) 1 mg tablet, Take 1 tablet 1 hour prior to your MRI. May repeat 15-30 minutes prior to your MRI., Disp: 2 tablet, Rfl: 0       Objective   Vitals:    09/11/24 1021 09/11/24 1026   BP: 144/79 142/84    BP Location: Right arm Right arm   Patient Position: Sitting Standing   BP Cuff Size: Adult Adult   Pulse: 72 73   Resp: 16    Weight: 64.4 kg (142 lb)                  Physical Exam    Gait off- marked stooped posture, short steps, slower moving    When ON: Dyskinesia head bobbing, LUE/LLE mostly but mildly in RLE- choreiform movements--mild but continuous    MDS UPDRS 1st Score: Motor Examination  Is the patient on medication for treating the symptoms of Parkinson's Disease?: Yes  Patients receiving medication for treating the symptoms of Parkinson's Disease, chely the patient's clinical state.: Off  Is the patient on Levodopa?: Yes  Minutes since last Levodopa dose: 4.5hrs  Speech: 2  Facial Expression: 2  Rigidty Neck: 2  Rigidty RUE: 2  Rigidity - LUE: 2  Rigidity RLE: 0  Rigidity LLE: 0  Finger Tapping Right Hand: 1  Finger Tapping Left Hand: 1  Hand Movements- Right Hand: 1  Hand Movements- Left Hand: 1  Pronatiaon-Supination Movments - Right Hand: 2  Pronatiaon-Supination Movments Left Hand: 1  Toe Tapping Right Foot: 1  Toe Tapping - Left Foot: 1  Leg Agility - Right Le  Leg Agility - Left le  Arising from Chair: 2  Gait: 2  Freezing of Gait: 1  Postural Stability: 3  Posture: 3  Global Spontanteity of Movment ( Body Bradykinesia): 3  Postural Tremor - Right Hand: 1  Postural Tremor - Left hand: 0  Kinetic Tremor - Right hand: 1  Kinetic Tremor - Left hand: 1  Rest Tremor Amplitude - RUE: 2  Rest Tremor Amplitude - LUE: 0  Rest Tremor Amplitude - RLE: 1  Rest Tremor Amplitude - LLE: 0  Rest Tremor Amplitude - Lip/Jaw: 0  Constancy of Rest Tremor: 3  MDS UPDRS Total Score: 44  Were dyskinesias (chorea or dystonia) present during examination?: No  Did these movements interfere with your rating?: No    MDS UPDRS 2nd Score: Motor Examination  Is the patient on medication for treating the symptoms of Parkinson's Disease?: Yes  Patients receiving medication for treating the symptoms of Parkinson's  Disease, chely the patient's clinical state.: On  Is the patient on Levodopa?: Yes  Minutes since last Levodopa dose: 77mins  Speech: 2  Facial Expression: 2  Rigidty Neck: 1  Rigidty RUE: 2  Rigidity - LUE: 2  Rigidity RLE: 0  Rigidity LLE: 0  Finger Tapping Right Hand: 0  Finger Tapping Left Hand: 1  Hand Movements- Right Hand: 0  Hand Movements- Left Hand: 1  Pronatiaon-Supination Movments - Right Hand: 1  Pronatiaon-Supination Movments Left Hand: 2  Toe Tapping Right Foot: 0  Toe Tapping - Left Foot: 3  Leg Agility - Right Le  Leg Agility - Left le  Arising from Chair: 1  Gait: 2  Freezing of Gait: 0  Postural Stability: 3  Posture: 3  Global Spontanteity of Movment ( Body Bradykinesia): 2  Postural Tremor - Right Hand: 1  Postural Tremor - Left hand: 0  Kinetic Tremor - Right hand: 0  Kinetic Tremor - Left hand: 0  Rest Tremor Amplitude - RUE: 0  Rest Tremor Amplitude - LUE: 0  Rest Tremor Amplitude - RLE: 0  Rest Tremor Amplitude - LLE: 0  Rest Tremor Amplitude - Lip/Jaw: 0  Constancy of Rest Tremor: 0  MDS UPDRS Total Score: 30  Were dyskinesias (chorea or dystonia) present during examination?: Yes  Did these movements interfere with your rating?: No          Assessment/Plan   Mr. Hayden Hester is a 69 y.o. M with Parkinson's disease who presented for LRT today. Unable to complete at recent visit 24 (see for more details on DBS workup), as he was unable to walk safely to get in here off so is only 4.5hrs off meds today. OFF to ON with improvement in tremor and bradykinesia, had dyskinesia. Rigidity not better in arms, gait and postural instability unchanged. LLE bradykinesia was obviously worse even when repeating exam (1+ to 3+) and dyskinesia not overtly contributing.     He took 2 tabs Sinemet and was only 4.5hrs off as noted above.   OFF-44  ON 30  % change: 32%    Again discussed w/ patient and wife that balance/FOG would not improve with DBS, though rigidity can respond was not dopamine  responsive today (though only 2 tabs Sinemet).       Diagnoses and all orders for this visit:  Parkinson's disease with dyskinesia and fluctuating manifestations (Multi)      #Apts as planned for NP and Dr. Saenz    #We will discuss you at our next DBS meeting on the 24th--pending NP feedback tomorrow and Dr. Saenz apt next week      I, MADDISON Mckeon, personally performed discussion of care/treatment options as above, taking a total time of 24 minutes for today's visit.    Young Mckeon, MADDISON-C  Adult/Gerontological Nurse Practitioner   [facility], Department of Neurology  [facility] [place]  [facility]  [id number] [place] [address]  Phone: [phone]  Fax: [phone]

## 2024-09-12 ENCOUNTER — APPOINTMENT (OUTPATIENT)
Dept: PSYCHOLOGY | Facility: HOSPITAL | Age: 69
End: 2024-09-12
Payer: MEDICARE

## 2024-09-12 DIAGNOSIS — R41.3 MEMORY DIFFICULTY: Primary | ICD-10-CM

## 2024-09-12 PROCEDURE — 96138 PSYCL/NRPSYC TECH 1ST: CPT | Performed by: CLINICAL NEUROPSYCHOLOGIST

## 2024-09-12 PROCEDURE — 96116 NUBHVL XM PHYS/QHP 1ST HR: CPT | Performed by: CLINICAL NEUROPSYCHOLOGIST

## 2024-09-12 PROCEDURE — 96139 PSYCL/NRPSYC TST TECH EA: CPT | Performed by: CLINICAL NEUROPSYCHOLOGIST

## 2024-09-12 PROCEDURE — 96132 NRPSYC TST EVAL PHYS/QHP 1ST: CPT | Mod: AH,95 | Performed by: CLINICAL NEUROPSYCHOLOGIST

## 2024-09-12 PROCEDURE — 96139 PSYCL/NRPSYC TST TECH EA: CPT | Mod: AH,95 | Performed by: CLINICAL NEUROPSYCHOLOGIST

## 2024-09-12 PROCEDURE — 96138 PSYCL/NRPSYC TECH 1ST: CPT | Mod: AH,95 | Performed by: CLINICAL NEUROPSYCHOLOGIST

## 2024-09-12 PROCEDURE — 96133 NRPSYC TST EVAL PHYS/QHP EA: CPT | Performed by: CLINICAL NEUROPSYCHOLOGIST

## 2024-09-12 PROCEDURE — 96116 NUBHVL XM PHYS/QHP 1ST HR: CPT | Mod: AH,95 | Performed by: CLINICAL NEUROPSYCHOLOGIST

## 2024-09-12 PROCEDURE — 96133 NRPSYC TST EVAL PHYS/QHP EA: CPT | Mod: AH,95 | Performed by: CLINICAL NEUROPSYCHOLOGIST

## 2024-09-12 PROCEDURE — 96132 NRPSYC TST EVAL PHYS/QHP 1ST: CPT | Performed by: CLINICAL NEUROPSYCHOLOGIST

## 2024-09-18 ENCOUNTER — APPOINTMENT (OUTPATIENT)
Dept: NEUROLOGY | Facility: CLINIC | Age: 69
End: 2024-09-18
Payer: MEDICARE

## 2024-09-18 VITALS — DIASTOLIC BLOOD PRESSURE: 74 MMHG | RESPIRATION RATE: 18 BRPM | HEART RATE: 67 BPM | SYSTOLIC BLOOD PRESSURE: 115 MMHG

## 2024-09-18 DIAGNOSIS — G20.B2 PARKINSON'S DISEASE WITH DYSKINESIA AND FLUCTUATING MANIFESTATIONS: Primary | ICD-10-CM

## 2024-09-18 PROCEDURE — 1036F TOBACCO NON-USER: CPT | Performed by: STUDENT IN AN ORGANIZED HEALTH CARE EDUCATION/TRAINING PROGRAM

## 2024-09-18 PROCEDURE — 1159F MED LIST DOCD IN RCRD: CPT | Performed by: STUDENT IN AN ORGANIZED HEALTH CARE EDUCATION/TRAINING PROGRAM

## 2024-09-18 PROCEDURE — 99215 OFFICE O/P EST HI 40 MIN: CPT | Performed by: STUDENT IN AN ORGANIZED HEALTH CARE EDUCATION/TRAINING PROGRAM

## 2024-09-18 ASSESSMENT — PATIENT HEALTH QUESTIONNAIRE - PHQ9
1. LITTLE INTEREST OR PLEASURE IN DOING THINGS: NOT AT ALL
2. FEELING DOWN, DEPRESSED OR HOPELESS: NOT AT ALL
SUM OF ALL RESPONSES TO PHQ9 QUESTIONS 1 AND 2: 0

## 2024-09-18 ASSESSMENT — ENCOUNTER SYMPTOMS
DEPRESSION: 0
LOSS OF SENSATION IN FEET: 0
OCCASIONAL FEELINGS OF UNSTEADINESS: 0

## 2024-09-18 NOTE — PROGRESS NOTES
Subjective     Hayden Hester is a 69 y.o. year old male who is here for a new consultation for Parkinson's disease however it is for DBS clearance and evaluation.  He had seen my colleagues Dr. Matthews and Dr. Kunz who had diagnosed him with Parkinson's he was started on a treatment for PD.  He was following them and saw Dr. Kunz once in the past.  Subsequently he was considered a candidate of DBS stimulation therefore he was referred to our program.  He saw us last week for LRT for which he was of for 4 and half hours because he could not tolerate longer of despite that there was an improvement in UPDRS from 44-30 that was a 32% change with 40.5 hours of.  Significant reduction was in the tremor and other symptoms but rigidity was improved to smaller extent.  However overall he had significant improvement.  He says that he gets fluctuations he takes medications 3 hours apart and gets dyskinesias in between.  While he is sitting here in the clinic I do see emergence of tremor towards the latter half of his visit while the middle half of the visit he was having some dyskinesias hence I do see fluctuation right here at this time.  No other focal deficits.      Review of Systems as noted in HPI    There is no problem list on file for this patient.    Past Medical History:   Diagnosis Date    Hyperlipidemia, unspecified     Diet-controlled hyperlipidemia     Past Surgical History:   Procedure Laterality Date    HERNIA REPAIR  08/08/2018    Inguinal Hernia Repair     Social History     Tobacco Use    Smoking status: Former     Types: Cigarettes    Smokeless tobacco: Never   Substance Use Topics    Alcohol use: Not Currently     family history is not on file.    Current Outpatient Medications:     atorvastatin (Lipitor) 10 mg tablet, Take by mouth., Disp: , Rfl:     carbidopa-levodopa (Sinemet CR)  mg ER tablet, Take 1 tab at 10pm bedtime x 2 weeks if needed take 2 tabs at bedtime, Disp: 180 tablet, Rfl: 3     carbidopa-levodopa (Sinemet)  mg tablet, Take 1 tablet by mouth 4 times a day., Disp: 360 tablet, Rfl: 3    entacapone (Comtan) 200 mg tablet, Take 1 tablet (200 mg) by mouth 4 times a day. Take with carbidopa/levodopa, Disp: 360 tablet, Rfl: 3    levodopa (Inbrija) 42 mg capsule, w/inhalation device, Take 2 caps inhalation prn for off phases up to 5 times daily, Disp: 60 capsule, Rfl: 3    LORazepam (Ativan) 1 mg tablet, Take 1 tablet 1 hour prior to your MRI. May repeat 15-30 minutes prior to your MRI., Disp: 2 tablet, Rfl: 0  No Known Allergies    Objective   Neurological Exam  Alert and oriented x 4, extraocular full, saccades normal, VOR normal, pursuit already/facial sensory intact face symmetric.  No abnormal involuntary eye movements noted there is jaw dyskinesia when he was on and when he is getting closer of I do see emergence of tremor of his chin and lip to some extent.  His power in all 4 extremities intact however I do see tremor towards the off symptoms.  It is most to the resting tremor but also there is some component of action postural.  Resting tremor is much more robust.  There is increasing the tone in both upper and lower extremity see UPDRS in our previous note.  His gait is also short strides with reduced arm swing.  reflexes unremarkable no ataxia.      Assessment/Plan   The patient is a 69-year-old man with a history of Parkinson's disease who is here for consultation for deep brain stimulator implantation.  He already had an LRT with 32% improvement with only 4.5 hours of which is quite remarkable.  He also had neuropsych evaluation formal discussion report is pending.  We will discuss about him in our next meeting with neurosurgery neuropsychology and us.  At that time we will make further determination however from my standpoint from the neurological standpoint he appears to be a good candidate since he had good UPDRS improvement with only 4.5 hours of.  Given the frequency and the  goal of medication burden reduction he would be a good candidate for bilateral STN DBS.  Given his age I would vote for [employer] device.  I will discuss further with our team in over conference.

## 2024-09-23 ENCOUNTER — APPOINTMENT (OUTPATIENT)
Dept: NEUROLOGY | Facility: CLINIC | Age: 69
End: 2024-09-23
Payer: MEDICARE

## 2024-09-23 VITALS
HEIGHT: 65 IN | HEART RATE: 64 BPM | WEIGHT: 142 LBS | BODY MASS INDEX: 23.66 KG/M2 | TEMPERATURE: 97.3 F | SYSTOLIC BLOOD PRESSURE: 108 MMHG | DIASTOLIC BLOOD PRESSURE: 64 MMHG

## 2024-09-23 DIAGNOSIS — G20.B2 PARKINSON'S DISEASE WITH DYSKINESIA AND FLUCTUATING MANIFESTATIONS: Primary | ICD-10-CM

## 2024-09-23 PROCEDURE — 1160F RVW MEDS BY RX/DR IN RCRD: CPT | Performed by: PSYCHIATRY & NEUROLOGY

## 2024-09-23 PROCEDURE — 1159F MED LIST DOCD IN RCRD: CPT | Performed by: PSYCHIATRY & NEUROLOGY

## 2024-09-23 PROCEDURE — 1036F TOBACCO NON-USER: CPT | Performed by: PSYCHIATRY & NEUROLOGY

## 2024-09-23 PROCEDURE — 3008F BODY MASS INDEX DOCD: CPT | Performed by: PSYCHIATRY & NEUROLOGY

## 2024-09-23 PROCEDURE — 99213 OFFICE O/P EST LOW 20 MIN: CPT | Performed by: PSYCHIATRY & NEUROLOGY

## 2024-09-23 NOTE — PROGRESS NOTES
NEUROLOGY OUTPATIENT FOLLOW-UP NOTE    Assessment/Plan   Diagnoses and all orders for this visit:  Parkinson's disease with dyskinesia and fluctuating manifestations (Multi)      IMPRESSION:  Parkinson's disease with dyskinesia.    PLAN:  Continue carbidopa/levodopa 25/250 qid with entacapone 200 mg qid, and carbidopa/levodopa 25/100 ER at 2200.  Undergoing DBS evaluation.  I will see him again in four months or prn.      Chano Matthews Jr., M.D., FAAN   ----------    Subjective     Hayden Hester is a 69 y.o. year old male here for follow-up.  His wife accompanies him today.    The patient saw Dr. Kunz, who added carbidopa/levodopa ER in the evening, and referred him for DBS evaluation, which is ongoing; he just saw Dr. Saenz a few days ago.  He was offered nasal levodopa, but has not tried it yet.  In the meantime, his current regimen including carbidopa/levodopa regular release with entacapone lasts up to several hours.  He denies other focal neurological symptoms including dysarthria, dysphagia, diplopia, focal weakness, focal sensory change, ataxia, vertigo, or bowel/bladder incontinence, among others.      Past Medical History:   Diagnosis Date    Hyperlipidemia, unspecified     Diet-controlled hyperlipidemia     Past Surgical History:   Procedure Laterality Date    HERNIA REPAIR  08/08/2018    Inguinal Hernia Repair     Social History     Tobacco Use    Smoking status: Former     Types: Cigarettes    Smokeless tobacco: Never   Substance Use Topics    Alcohol use: Not Currently     family history is not on file.    Current Outpatient Medications:     atorvastatin (Lipitor) 10 mg tablet, Take by mouth., Disp: , Rfl:     carbidopa-levodopa (Sinemet CR)  mg ER tablet, Take 1 tab at 10pm bedtime x 2 weeks if needed take 2 tabs at bedtime, Disp: 180 tablet, Rfl: 3    carbidopa-levodopa (Sinemet)  mg tablet, Take 1 tablet by mouth 4 times a day., Disp: 360 tablet, Rfl: 3    entacapone (Comtan)  "200 mg tablet, Take 1 tablet (200 mg) by mouth 4 times a day. Take with carbidopa/levodopa, Disp: 360 tablet, Rfl: 3    levodopa (Inbrija) 42 mg capsule, w/inhalation device, Take 2 caps inhalation prn for off phases up to 5 times daily, Disp: 60 capsule, Rfl: 3    LORazepam (Ativan) 1 mg tablet, Take 1 tablet 1 hour prior to your MRI. May repeat 15-30 minutes prior to your MRI., Disp: 2 tablet, Rfl: 0  No Known Allergies    Objective     /64   Pulse 64   Temp 36.3 °C (97.3 °F)   Ht 1.651 m (5' 5\")   Wt 64.4 kg (142 lb)   BMI 23.63 kg/m²     CONSTITUTIONAL:  No acute distress    MENTAL STATUS:  Awake, alert, fully oriented to self, place, and time, with present short-term memory, good awareness of recent events, normal attention span, concentration, and fund of knowledge.    SPEECH AND LANGUAGE:  Can name and repeat, follows all commands, has no dysarthria    CRANIAL NERVES:  II-Vision present, visual fields full to confrontational testing    III/IV/VI--EOMs are present in all directions.  Pupils are symmetrically reactive in dim light.  No ptosis.    V--Normal facial sensation.    VII--No facial asymmetry.    VIII--Hearing present to voice bilaterally.    IX/X--Symmetric soft palate rise.    XI--Normal trapezius power bilaterally.    XII--Tongue protrudes without deviation.    MOTOR:  Normal power and bulk in both arms and both legs.  Minor increase of tone diffusely, but no cogwheeling or tremor today (improved, patient took medication last at 0600 today, four hours prior to appointment).  He has mild dyskinesias of the arms today.    SENSORY:  Normal pin sensation in both arms and both legs without distal-proximal gradient, asymmetry, or spinal sensory level.    COORDINATION:  Normal finger-to-nose and heel-to-shin testing in both arms and both legs.    REFLEXES are normal and symmetric at the biceps, triceps, brachioradialis, patella, and ankle.  The plantar responses are flexor.    GAIT is normal, " without steppage, ataxia, shuffling, or spasticity.  He takes four steps to turn around and has no tremor on ambulation today.      Chano Matthews Jr., M.D., [facility]

## 2024-09-24 ENCOUNTER — TELEPHONE (OUTPATIENT)
Dept: NEUROLOGY | Facility: CLINIC | Age: 69
End: 2024-09-24
Payer: MEDICARE

## 2024-09-24 NOTE — TELEPHONE ENCOUNTER
I called the patient after his case was discussed at DBS conference to let them know they are a good candidate for DBS surgery. I did not get an answer, was unable to get a hold of him. I left a VM for him to call me back directly and will let him know the next steps when he returns my call. MRI has been ordered since June and is not yet scheduled.

## 2024-09-25 ENCOUNTER — TELEPHONE (OUTPATIENT)
Dept: NEUROLOGY | Facility: CLINIC | Age: 69
End: 2024-09-25
Payer: MEDICARE

## 2024-09-25 NOTE — TELEPHONE ENCOUNTER
I was able to speak to the patient today after  his case was discussed at DBS conference 9.24.2024 and let them know they are a good candidate for DBS surgery. I let him know he could expect to hear from Dr Fry's office when that have an OR date and know when he should schedule pre admission testing.

## 2024-10-04 DIAGNOSIS — G20.B2 PARKINSON'S DISEASE WITH DYSKINESIA AND FLUCTUATING MANIFESTATIONS: Primary | ICD-10-CM

## 2024-10-23 ENCOUNTER — HOSPITAL ENCOUNTER (OUTPATIENT)
Dept: RADIOLOGY | Facility: HOSPITAL | Age: 69
Discharge: HOME | End: 2024-10-23
Payer: MEDICARE

## 2024-10-23 DIAGNOSIS — G20.B2 PARKINSON'S DISEASE WITH DYSKINESIA AND FLUCTUATING MANIFESTATIONS: ICD-10-CM

## 2024-10-23 PROCEDURE — 70553 MRI BRAIN STEM W/O & W/DYE: CPT

## 2024-10-23 PROCEDURE — 70460 CT HEAD/BRAIN W/DYE: CPT

## 2024-10-23 PROCEDURE — A9575 INJ GADOTERATE MEGLUMI 0.1ML: HCPCS | Performed by: NURSE PRACTITIONER

## 2024-10-23 PROCEDURE — 2550000001 HC RX 255 CONTRASTS: Performed by: NURSE PRACTITIONER

## 2024-10-23 PROCEDURE — 70450 CT HEAD/BRAIN W/O DYE: CPT | Performed by: RADIOLOGY

## 2024-10-23 RX ORDER — GADOTERATE MEGLUMINE 376.9 MG/ML
12 INJECTION INTRAVENOUS
Status: COMPLETED | OUTPATIENT
Start: 2024-10-23 | End: 2024-10-23

## 2024-10-30 ENCOUNTER — APPOINTMENT (OUTPATIENT)
Dept: NEUROLOGY | Facility: CLINIC | Age: 69
End: 2024-10-30
Payer: MEDICARE

## 2024-11-12 ENCOUNTER — TELEPHONE (OUTPATIENT)
Dept: NEUROSURGERY | Facility: HOSPITAL | Age: 69
End: 2024-11-12
Payer: MEDICARE

## 2024-11-12 DIAGNOSIS — G93.89 CEREBRAL VENTRICULOMEGALY: Primary | ICD-10-CM

## 2024-11-12 DIAGNOSIS — Z86.39 H/O HYPERLIPIDEMIA: ICD-10-CM

## 2024-11-12 DIAGNOSIS — G20.B2 PARKINSON'S DISEASE WITH DYSKINESIA AND FLUCTUATING MANIFESTATIONS: ICD-10-CM

## 2024-11-12 NOTE — TELEPHONE ENCOUNTER
Called and spoke to patient regarding updated recommendations from her DBS conference today. We discussed that his MRI showed enlarged ventricles, which is concerning for possible hydrocephalus. As such, it has been recommended that patient undergo and LP with NP for NPH workup. We discussed the procedure and process, and will provide patient with appropriate referrals. We will then plan for patient to follow up with Dr. Fry to discuss the results and next steps. Patient verbalized understanding and agreeable with plan.

## 2024-11-27 ENCOUNTER — APPOINTMENT (OUTPATIENT)
Dept: RADIOLOGY | Facility: HOSPITAL | Age: 69
End: 2024-11-27
Payer: MEDICARE

## 2024-12-04 ENCOUNTER — APPOINTMENT (OUTPATIENT)
Dept: RADIOLOGY | Facility: HOSPITAL | Age: 69
End: 2024-12-04
Payer: MEDICARE

## 2024-12-10 PROBLEM — R41.3 MEMORY DIFFICULTY: Status: ACTIVE | Noted: 2024-12-10

## 2024-12-10 NOTE — PROGRESS NOTES
Neuropsychological Consultation    Name (, MRN):  Hayden HERNÁNDEZ (1955, [id number])  Exam Date:   2024  Follow-Up Visit Date: 2024  Referring:   Aasef Shaikh, MD, PhD, Neurology    REASON FOR EVALUATION:    DBS Presurgical Evaluation to Rule out Memory Loss    For part of this evaluation, a telehealth service was provided using an interactive audio and video telecommunication system which permits real time communications between the patient (at the originating site) and provider (at the distant site).  Mr. Hernández's identity was verified with name, date of birth, and address. Mr. Hernández attested to his/her current location and provided a phone number in the event of interruption in communication.    CURRENT COMPLAINTS AND HISTORY:      Mr. Hernández is a 69-year-old right-handed male with Parkinson's disease who is under consideration for deep brain stimulation surgery.  Neuropsychological evaluation was requested as part of that presurgical workup to rule out memory loss (.93/R41.3) and other risk factors associated with that treatment.  Mr. Hernández denied having any cognitive complaints, and there is no limitation in instrumental activities of daily living (IADLs) attributed to cognitive difficulties.    Upon review of features/signs/symptoms that can sometimes accompany memory or cognitive difficulty, Mr. Hernández denied significant difficulty with word finding, sense of direction/getting lost, driving incidents/accidents/violations, leaving oven/stove on or burning food, leaving doors open/unlocked, leaving lights on/water running, urinary frequency/urgency/incontinence, nightmares, vivid/lucid dreams, visual hallucinations, fluctuations in cognitive status, changes in personality, impulse control issues, and vulnerability to deception (e.g., phone, mail, or email schemes).      Other developmental, medical/surgical, and psychiatric history was reviewed and was deemed to be noncontributory to the  presenting complaint.  Mr. Hester denied clinically significant use of alcohol, tobacco and recreational drugs.      Past medical, psychiatric, and surgical history was reviewed and is otherwise noncontributory to the presenting concern.   Mr. Hester reported no use of alcohol since 2018 (and rare, modest use before then) and denied use of tobacco and recreational drugs.  He has been  29 years with 3 children.      Family history is noteworthy for memory loss (mother, late-onset dementia).  There is no known family history of depression/anxiety/mental health conditions, coronary artery disease, stroke/aneurysm, Parkinson's disease/essential tremor/movement disorder, epilepsy/seizures, brain tumor, multiple sclerosis, or other neurological disorder.      English is Mr. Hester's 2nd language; he learned English 35 years ago and completed a Bachelor's degree and a Master's degree in Business Administration (all complete in English).  There is no history of attention difficulties or learning problems.  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    MEDICATIONS:      As of 9/12/2024 7:15 AM  atorvastatin calcium 10 mg oral  carbidopa/levodopa   mg tablet, 1 tablet oral 4 times daily   mg tablet extended release, Take 1 tab at 10pm bedtime x 2 weeks if needed take 2 tabs at bedtime  entacapone 200 mg oral 4 times daily, Take with carbidopa/levodopa  levodopa 42 mg Take 2 caps inhalation prn for off phases up to 5 times daily  lorazepam 1 mg Take 1 tablet 1 hour prior to your MRI. May repeat 15-30 minutes prior to your MRI.    MENTAL STATUS, BEHAVIOR OBSERVATIONS, AND VALIDITY:      During interview, Mr. Hester presented in no acute distress and was well groomed.  He was alert and oriented to person, place and time.  He ambulated with no assistance; upon casual observation, gait slow and unsteady.  He  appeared to dyskinetic movements of his trunk and legs.  Mr. Hester appeared to comprehend spoken language without difficulty.  Conversational speech was characterized bylong response latencies, slow rate, and hypophonia but was otherwise articulate and fluent with normal volume, rhythm, rate, and intonation and with no discernible dysnomia.  Expressed thoughts were logically-organized, reality-based, and appropriate to context.  Within the interview, Mr. Hester seemed to recall recent information with accuracy (short-term memory), retrieved remote personal history with ease (long-term memory), exhibited good insight into the current condition and the reason for the evaluation, and evidenced no apparent lapses in judgment.  During interview, his mood was pleasant, and affect was appropriate to content and context.  Mood was described as “anxious.”  Mr. Hester denied anhedonia, altered appetite, sleep disturbance, hallucinations, and suicidal/homicidal ideation; there were no apparent delusions.      During testing, rapport was quickly established, and Mr. Hestre appeared to give good effort on all tasks; scores on performance validity measures were within normal limits.  Response latencies were longer than normal and fine motor speed was slow across tasks.  These test behaviors were taken into consideration during interpretation.  These results as interpreted are considered reliable and valid.      ASSESSMENT PROCEDURES:      08/19/24 (Conducted In-Person):      Review of Records; Neurobehavioral Interview with Mr. Hester and his wife Li; Examination of Task Engagement; Wechsler Test of Adult Reading (WTAR); Stroop Color Word Test; Trail Making Test (TMT, Form 1); Judgment of Line Orientation (JOLO); [place] Cancellation Test; Finger Tapping; Modified [place] Card Sorting Test (M-WCST); NAB Naming Test; Controlled Oral Word Association Test (COWA, Form 1) Phonemic and Semantic Fluency; Wechsler Memory Scale-4th Ed.  (WMS-IV) Logical Memory and Visual Reproductions; [place] Auditory-Verbal Learning Test (RAVLT, Form 1); [place]ade-[place]sterrieth/Extended Complex Figure Test (ECFT); Lara Depression Inventory- 2nd Ed. (BDI-II); Lara Anxiety Inventory (ALIX).      09/12/24 (Conducted Virtually by Interactive Audio and Video Telecommunication):      Interactive Feedback; Coordination of care with other health care providers involved in the care plan; Integration, interpretation, and preparation of final report.      CONCLUSIONS AND DIAGNOSTIC IMPRESSIONS:    Mr. Hester presented with a history of Parkinson's disease and with mild neuropsychological deficiencies that are commonly observed with that condition, with no compelling evidence of dementia or disproportionate executive deficits and with no current mental health concerns.  He appears to be a good candidate for DBS from the neuropsychological standpoint.     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    RECOMMENDATIONS:    If Mr. Hester proceeds to surgery, please notify me and the [facility] [place] ([phone]) when the surgery date is set so that we can schedule the postoperative neuropsychological evaluation (6 months after surgery or as early as 3 months if adverse cognitive changes are apparent).    The following activities and interventions are recommended for optimizing brain health and preserving cognitive function:  the “MIND diet” for heart and brain health   (https://[email or web address]/[email or web address]-weight/diet-reviews/mind-diet/);  good stress management (e.g., relaxation techniques);  management of any vascular risks (e.g., diabetes, hypertension, cholesterol);  30-60 minutes of daily aerobic exercise (e.g.,  walking, swimming);  social engagement (e.g., getting together with other people regularly); and   cognitively stimulating activities (e.g., playing cards, board games, computer games; taking classes, joining study/discussion groups, pursuing a new hobby; completing crossword, Ibelemoku, word-search and other puzzles; reading books about new topics, cultures, or geographical areas).      Restorative sleep (7-9 hours for adults) contributes to good physical and brain health and is critical to daytime alertness and safety, cognitive functioning, school/work performance, mood, and interpersonal relationships.  The following behavioral strategies and environmental modifications can improve onset, duration and quality of sleep:  Set a fixed bedtime and waking time every day.  Avoid napping during the day.   Avoid alcohol, caffeine and heavy, spicy, or sugary foods 4-6 hours before bedtime.   Exercise regularly, but not right before going to bed.  Block out all distractions by turning off - or even removing from the room - electronic devices such as TV, computer, phone, video player, audio player, justice device, etc.  Reading for pleasure is an excellent substitute for activities on electronic devices for the purpose of improving sleep onset.  Use comfortable bedding, set a comfortable temperature, and keep the room well ventilated.  Do not use the bed as an office, workroom, or recreation room.   Establish a pre-sleep ritual, such as a warm bath or a few minutes of reading.  If prone to anxiety, relaxation techniques such as yoga and deep breathing can be helpful.     Good hydration is important to optimal cognitive functioning and has many other health benefits as well (e.g., increased energy, better mood, and prevention/reduction in headache, dizziness, and other health symptoms/problems).  Guidelines vary depending on multiple factors, but several general principles are consistently endorsed:  At a minimum, drink fluids  whenever you feel thirsty; plain water is the best source of fluid intake; and avoid caffeine and alcohol, which reduce fluid in the body.  Your primary care provider can provide specific guidelines for you personally.    I reviewed and discussed results, impressions, and recommendations with Mr. Hester at the conclusion of the evaluation.  I provided patient education regarding the various causes of memory complaints (e.g., normal aging, stress/depression/anxiety, sleep, neurological conditions) and the role of neuropsychological evaluation in presurgical evaluation and postsurgical management and answered all questions to his satisfaction at that time.  I also reviewed evidence-based activities and health behaviors to promote brain health and to preserve cognitive functioning as we get older, which are detailed in the recommendations above.      Thank you for the opportunity to participate in Mr. Hester's evaluation and care.  If I can provide any additional assistance, please do not hesitate to contact me at [phone].    Sincerely,        Gallo Godoy, PhD  Senior Attending Neuropsychologist, [facility]  Former Director of Clinical Neuropsychology, [facility] [place]  Professor, Dept. of Neurology, [employer]  Fellow of the National Academy of Neuropsychology  Fellow of the American Psychological Association  Fellow of the Sports Neuropsychology Society  Fellow of the American Epilepsy Society

## 2024-12-11 ENCOUNTER — APPOINTMENT (OUTPATIENT)
Dept: PSYCHOLOGY | Facility: HOSPITAL | Age: 69
End: 2024-12-11
Payer: MEDICARE

## 2024-12-11 ENCOUNTER — APPOINTMENT (OUTPATIENT)
Dept: RADIOLOGY | Facility: HOSPITAL | Age: 69
End: 2024-12-11
Payer: MEDICARE

## 2025-01-02 NOTE — PROGRESS NOTES
[facility]   Neurosurgery    Diagnosis  Hayden was seen today for follow-up.  Diagnoses and all orders for this visit:  Parkinson's disease with dyskinesia and fluctuating manifestations      Patient Discussion/Summary  1) Today we reviewed all of the brain imaging that raised concern for the diagnosis of normal pressure hydrocephalus (NPH). We also discussed the signs and symptoms related to NPH and the related work-up and surgical treatment, if indicated.     2) However, after again examining you and discussing your symptoms with you and your family, it is clear that your symptoms are all consistent with Parkinson disease and not NPH. As such, I agree with canceling the NPH work-up and proceeding with DBS.     3) I would like to discuss your case once more with our neurologists in our DBS conference to make sure that the entire team is okay with this revised plan. Yet, we can simultaneously move forward with scheduling the surgery so that we do not delay it further.     Provider Impressions  69 y.o. right-handed male with PD since 2015, with symptoms of RUE tremor, bradykinesia, rigidity, and motor fluctuations. Patient had documented 32% change with ON/OFF testing, and neuropsychology was completed and reported no contraindication for surgery. He was initially discussed at our DBS conference on 9/24/24, and determined to be a good candidate for STN DBS with Medtronic system. However, patient had findings of notable ventricular dilation on preop MRI brain and was re-discussed at our DBS conference, where it was recommended NPH workup for further evaluation; though, he was not felt to have clinical criteria of NPH. In the meantime, patient was seen by Dr. Bradshaw at [facility] on 12/3/24 for second opinion regarding further evaluation needed prior to DBS, and was recommended not to pursue the NPH work-up since his sx are not consistent with that. Pt and his wife felt that there is no need to proceed with nph  work-up if he does not have s/s of it. He is also eager to be treated with dbs for his debilitating PD-related symptoms. Upon repeat examination and discussion today, the pt really has no signs or symptoms related to NPH and he is very clearly symptomatic from his PD. We reviewed the imaging and discussed the confusion, but I agree with canceling the NPH work-up and proceeding once more with DBS surgery, as planned.     History of Present Illness  Chief Complaint:   Chief Complaint   Patient presents with    Follow-up         HPI: Hayden Hester is a 69 y.o. right-handed male with PD since 2015, with symptoms of RUE tremor, bradykinesia, rigidity, and motor fluctuations. Patient had documented 32% change with ON/OFF testing, and neuropsychology was completed and reported no contraindication for surgery. He was initially discussed at our DBS conference on 9/24/24, and determined to be a good candidate for STN DBS with Medtronic system. However, patient had findings of notable ventricular dilation on preop MRI brain and was re-discussed at our DBS conference, where it was recommended NPH workup for further evaluation; though, he was not felt to have clinical criteria of NPH. In the meantime, patient was seen by Dr. Bradshaw at [facility] on 12/3/24 for second opinion regarding further evaluation needed prior to DBS, and was recommended not to pursue the NPH work-up since his sx are not consistent with that. He has not yet completed NPH testing. Patient presents today for follow up and to further discuss DBS surgery.     Pt and his wife felt that there is no need to proceed with nph work-up if he does not have s/s of it. He is also eager to be treated with dbs for his debilitating PD-related symptoms.    ROS: As noted in HPI.      Previous History  Past Medical History:   Diagnosis Date    Hyperlipidemia, unspecified     Diet-controlled hyperlipidemia     Past Surgical History:   Procedure Laterality Date    HERNIA REPAIR   "08/08/2018    Inguinal Hernia Repair     Social History     Tobacco Use    Smoking status: Former     Types: Cigarettes    Smokeless tobacco: Never   Vaping Use    Vaping status: Never Used   Substance Use Topics    Alcohol use: Not Currently    Drug use: Never     No family history on file.  No Known Allergies  Current Outpatient Medications   Medication Instructions    atorvastatin (Lipitor) 10 mg tablet Take by mouth.    carbidopa-levodopa (Sinemet CR)  mg ER tablet Take 1 tab at 10pm bedtime x 2 weeks if needed take 2 tabs at bedtime    carbidopa-levodopa (Sinemet)  mg tablet 1 tablet, oral, 4 times daily    entacapone (COMTAN) 200 mg, oral, 4 times daily, Take with carbidopa/levodopa    LORazepam (Ativan) 1 mg tablet Take 1 tablet 1 hour prior to your MRI. May repeat 15-30 minutes prior to your MRI.         Vitals  /81   Pulse 83   Temp 36 °C (96.8 °F)   Ht 1.676 m (5' 6\")   Wt 68 kg (150 lb)   BMI 24.21 kg/m²       Physical Exam    Gait consistent with PD -related shuffling, without signs of wide-based, magnetic gait seen with NPH. Pt is not having any signs of cognitive decline c/w with NPH. Pt has typical PD related sx of rigidity, bradykinesia, shuffling.     Results  MRI with ventriculomegaly concerning for NPH but must be correlated with clinical sx and findings, which it is not.                 "

## 2025-01-03 ENCOUNTER — OFFICE VISIT (OUTPATIENT)
Dept: NEUROSURGERY | Facility: CLINIC | Age: 70
End: 2025-01-03
Payer: MEDICARE

## 2025-01-03 VITALS
DIASTOLIC BLOOD PRESSURE: 81 MMHG | HEART RATE: 83 BPM | SYSTOLIC BLOOD PRESSURE: 167 MMHG | BODY MASS INDEX: 24.11 KG/M2 | HEIGHT: 66 IN | TEMPERATURE: 96.8 F | WEIGHT: 150 LBS

## 2025-01-03 DIAGNOSIS — G20.B2 PARKINSON'S DISEASE WITH DYSKINESIA AND FLUCTUATING MANIFESTATIONS: Primary | ICD-10-CM

## 2025-01-03 PROCEDURE — 99214 OFFICE O/P EST MOD 30 MIN: CPT | Performed by: NEUROLOGICAL SURGERY

## 2025-01-03 PROCEDURE — 1159F MED LIST DOCD IN RCRD: CPT | Performed by: NEUROLOGICAL SURGERY

## 2025-01-03 PROCEDURE — 1126F AMNT PAIN NOTED NONE PRSNT: CPT | Performed by: NEUROLOGICAL SURGERY

## 2025-01-03 PROCEDURE — 3008F BODY MASS INDEX DOCD: CPT | Performed by: NEUROLOGICAL SURGERY

## 2025-01-03 ASSESSMENT — PAIN SCALES - GENERAL: PAINLEVEL_OUTOF10: 0-NO PAIN

## 2025-01-14 ENCOUNTER — TELEPHONE (OUTPATIENT)
Dept: NEUROSURGERY | Facility: HOSPITAL | Age: 70
End: 2025-01-14
Payer: MEDICARE

## 2025-01-20 ENCOUNTER — APPOINTMENT (OUTPATIENT)
Dept: NEUROLOGY | Facility: CLINIC | Age: 70
End: 2025-01-20
Payer: MEDICARE

## 2025-01-21 ENCOUNTER — PREP FOR PROCEDURE (OUTPATIENT)
Dept: NEUROSURGERY | Facility: HOSPITAL | Age: 70
End: 2025-01-21
Payer: MEDICARE

## 2025-01-21 DIAGNOSIS — G20.B2 PARKINSON'S DISEASE WITH DYSKINESIA AND FLUCTUATING MANIFESTATIONS: Primary | ICD-10-CM

## 2025-01-23 NOTE — PROGRESS NOTES
[facility]   Neurosurgery    Diagnosis  Hayden was seen today for follow-up.  Diagnoses and all orders for this visit:  Parkinson's disease with dyskinesia and fluctuating manifestations      Patient Discussion/Summary  Today we again discussed the results of our DBS conference related to your enlarged ventricles. We also again discussed that you do not have clinical symptoms of NPH but do exhibit classic symptoms of Parkinson disease with motor fluctuations. In addition, your neuropsychology evaluation was unconcerning for dementia or cognitive impairment.   As such, I think it is still reasonable to proceed with DBS. We discussed at length the potential risks in general and related to enlarged ventricles.   Ultimately, since you understand the risks, we agreed that we should proceed forward with surgery to improve your Parkinson related motor symptoms.     Provider Impressions    69 y.o. right-handed male with PD since 2015, with symptoms of RUE tremor, bradykinesia, rigidity, and motor fluctuations. Patient had documented 32% change with ON/OFF testing, and neuropsychology was completed and reported no contraindication for surgery. He was initially discussed at our DBS conference on 9/24/24, and determined to be a good candidate for STN DBS with Medtronic system. However, patient had findings of notable ventricular dilation on preop MRI brain and was re-discussed at our DBS conference, where it was recommended NPH workup for further evaluation; though, he was not felt to have clinical criteria of NPH. In the meantime, patient was seen by Dr. Bradshaw at [facility] on 12/3/24 for second opinion regarding further evaluation needed prior to DBS, and was not felt to have s/s consistent with NPH, but deferred any necessary evaluations prior to DBS surgery to us. During his follow up visit with me on 1/3/25, we further discussed his findings and symptoms, and he and his wife felt strongly that there was no need for  further NPH workup, as he does not have any signs or symptoms, to which I agreed. As such, we discussed the patient at DBS conference again, and it was agreed that we could proceed with surgery, though with caution. Thus, I again discussed with pt and his wife today that it is possible he may have limited improvement in symptoms if he does have underlying early stages of NPH and/or he may develop symptoms of NPH immediately postop or down the line. If NPH clinical symptoms were to develop, we could address those at that time and proceed with an NPH surgery if needed at a later date. They understand and wish to proceed with DBS.          History of Present Illness  Chief Complaint:   Chief Complaint   Patient presents with    Follow-up     Virtual or Telephone Consent      HPI: Hayden Hester is a 69 y.o. right-handed male with PD since 2015, with symptoms of RUE tremor, bradykinesia, rigidity, and motor fluctuations. Patient had documented 32% change with ON/OFF testing, and neuropsychology was completed and reported no contraindication for surgery. He was initially discussed at our DBS conference on 9/24/24, and determined to be a good candidate for STN DBS with Medtronic system. However, patient had findings of notable ventricular dilation on preop MRI brain and was re-discussed at our DBS conference, where it was recommended NPH workup for further evaluation; though, he was not felt to have clinical criteria of NPH. In the meantime, patient was seen by Dr. Bradshaw at [facility] on 12/3/24 for second opinion regarding further evaluation needed prior to DBS, and was not felt to have s/s consistent with NPH, but deferred any necessary evaluations prior to DBS surgery to us. During his follow up visit with me on 1/3/25, we further discussed his findings and symptoms, and he and his wife felt strongly that there was no need for further NPH workup, as he does not have any signs or symptoms, which I agreed.     Since that time,  patient was again discussed at our DBS conference, and was recommended one final follow up visit here to discuss his imaging and possible implications with proceeding with DBS surgery. Patient is currently planned for b/l STN DBS surgery with Medtronic system on 2/27/25 by me.       ROS: As noted in HPI.      Previous History  Past Medical History:   Diagnosis Date    Hyperlipidemia, unspecified     Diet-controlled hyperlipidemia     Past Surgical History:   Procedure Laterality Date    HERNIA REPAIR  08/08/2018    Inguinal Hernia Repair     Social History     Tobacco Use    Smoking status: Former     Types: Cigarettes    Smokeless tobacco: Never   Vaping Use    Vaping status: Never Used   Substance Use Topics    Alcohol use: Not Currently    Drug use: Never     No family history on file.  No Known Allergies  Current Outpatient Medications   Medication Instructions    atorvastatin (Lipitor) 10 mg tablet Take by mouth.    carbidopa-levodopa (Sinemet CR)  mg ER tablet Take 1 tab at 10pm bedtime x 2 weeks if needed take 2 tabs at bedtime    carbidopa-levodopa (Sinemet)  mg tablet 1 tablet, oral, 4 times daily    entacapone (COMTAN) 200 mg, oral, 4 times daily, Take with carbidopa/levodopa    LORazepam (Ativan) 1 mg tablet Take 1 tablet 1 hour prior to your MRI. May repeat 15-30 minutes prior to your MRI.       Results

## 2025-01-24 ENCOUNTER — TELEMEDICINE (OUTPATIENT)
Dept: NEUROSURGERY | Facility: CLINIC | Age: 70
End: 2025-01-24
Payer: MEDICARE

## 2025-01-24 DIAGNOSIS — G20.B2 PARKINSON'S DISEASE WITH DYSKINESIA AND FLUCTUATING MANIFESTATIONS: Primary | ICD-10-CM

## 2025-01-24 PROCEDURE — 99214 OFFICE O/P EST MOD 30 MIN: CPT | Performed by: NEUROLOGICAL SURGERY

## 2025-01-24 PROCEDURE — 99214 OFFICE O/P EST MOD 30 MIN: CPT | Mod: 95 | Performed by: NEUROLOGICAL SURGERY

## 2025-01-28 ENCOUNTER — TELEPHONE (OUTPATIENT)
Dept: NEUROSURGERY | Facility: HOSPITAL | Age: 70
End: 2025-01-28
Payer: MEDICARE

## 2025-01-28 NOTE — TELEPHONE ENCOUNTER
----- Message from [place] RESTREPO sent at 1/27/2025 12:05 PM EST -----  Ayala Conte,     Patient's wife is finally returning your call. Let me know if there is anything I could do to help.      [place]

## 2025-01-28 NOTE — TELEPHONE ENCOUNTER
I called and spoke to patient and his wife regarding surgery planning. He's scheduled to have DNS surgery with Dr. Fry on 2/27/25 at [facility]. They are aware of preop testing. All questions answered. They understand and agrees to plan. Will call office with any further questions.

## 2025-02-06 ENCOUNTER — CLINICAL SUPPORT (OUTPATIENT)
Dept: PREADMISSION TESTING | Facility: HOSPITAL | Age: 70
End: 2025-02-06
Payer: MEDICARE

## 2025-02-06 DIAGNOSIS — G20.B2 PARKINSON'S DISEASE WITH DYSKINESIA AND FLUCTUATING MANIFESTATIONS: ICD-10-CM

## 2025-02-06 NOTE — CPM/PAT NURSE NOTE
CPM/PAT Nurse Note      Name: Hayden Hester (Samir Rabil)  /Age: 1955/69 y.o.       Past Medical History:   Diagnosis Date    Hyperlipidemia, unspecified     Diet-controlled hyperlipidemia    Memory loss or impairment     Parkinson's disease (Multi)        Past Surgical History:   Procedure Laterality Date    HERNIA REPAIR Right     Inguinal Hernia Repair       Patient  has no history on file for sexual activity.    No family history on file.    No Known Allergies    Prior to Admission medications    Medication Sig Start Date End Date Taking? Authorizing Provider   atorvastatin (Lipitor) 10 mg tablet Take by mouth once daily.    Historical Provider, MD   carbidopa-levodopa (Sinemet CR)  mg ER tablet Take 1 tab at 10pm bedtime x 2 weeks if needed take 2 tabs at bedtime 24   Marisela Kunz MD   carbidopa-levodopa (Sinemet)  mg tablet Take 1 tablet by mouth 4 times a day.  Patient taking differently: Take 1 tablet by mouth 6 times a day. 24  Chano Matthews MD   entacapone (Comtan) 200 mg tablet Take 1 tablet (200 mg) by mouth 4 times a day. Take with carbidopa/levodopa 24  Chano Matthews MD   LORazepam (Ativan) 1 mg tablet Take 1 tablet 1 hour prior to your MRI. May repeat 15-30 minutes prior to your MRI.  Patient not taking: Reported on 2025   Anila Monahan, APRN-CNP        PAT ROS     DASI Risk Score    No data to display       Caprini DVT Assessment    No data to display       Modified Frailty Index    No data to display       CHADS2 Stroke Risk  Current as of yesterday        N/A 3 to 100%: High Risk   2 to < 3%: Medium Risk   0 to < 2%: Low Risk     Last Change: N/A          This score determines the patient's risk of having a stroke if the patient has atrial fibrillation.        This score is not applicable to this patient. Components are not calculated.          Revised Cardiac Risk Index    No data to display       Apfel  Simplified Score    No data to display       Risk Analysis Index Results This Encounter    No data found in the last 10 encounters.       Prodigy: High Risk  Total Score: 16              Prodigy Age Score      Prodigy Gender Score          ARISCAT Score for Postoperative Pulmonary Complications    No data to display       Cavazos Perioperative Risk for Myocardial Infarction or Cardiac Arrest (SCARLET)    No data to display         Nurse Plan of Action:     RN screening call completed with patient and wife.  Reviewed allergies, medications and pharmacy, medical, surgical and social history with patient.  Chart updated.

## 2025-02-13 ENCOUNTER — PRE-ADMISSION TESTING (OUTPATIENT)
Dept: PREADMISSION TESTING | Facility: HOSPITAL | Age: 70
End: 2025-02-13
Payer: MEDICARE

## 2025-02-13 ENCOUNTER — DOCUMENTATION (OUTPATIENT)
Dept: PREADMISSION TESTING | Facility: HOSPITAL | Age: 70
End: 2025-02-13

## 2025-02-13 ENCOUNTER — LAB (OUTPATIENT)
Dept: LAB | Facility: HOSPITAL | Age: 70
End: 2025-02-13
Payer: MEDICARE

## 2025-02-13 VITALS
DIASTOLIC BLOOD PRESSURE: 84 MMHG | HEART RATE: 69 BPM | BODY MASS INDEX: 22.77 KG/M2 | WEIGHT: 136.69 LBS | OXYGEN SATURATION: 97 % | SYSTOLIC BLOOD PRESSURE: 150 MMHG | HEIGHT: 65 IN | RESPIRATION RATE: 16 BRPM | TEMPERATURE: 97.1 F

## 2025-02-13 DIAGNOSIS — R06.02 SOB (SHORTNESS OF BREATH) ON EXERTION: ICD-10-CM

## 2025-02-13 DIAGNOSIS — R73.9 ELEVATED BLOOD SUGAR: ICD-10-CM

## 2025-02-13 DIAGNOSIS — Z01.818 PREOP TESTING: Primary | ICD-10-CM

## 2025-02-13 DIAGNOSIS — R39.9 URINARY SYMPTOM OR SIGN: ICD-10-CM

## 2025-02-13 LAB
ABO GROUP (TYPE) IN BLOOD: NORMAL
ANION GAP SERPL CALC-SCNC: 9 MMOL/L (ref 10–20)
ANTIBODY SCREEN: NORMAL
APPEARANCE UR: CLEAR
APTT PPP: 31 SECONDS (ref 27–38)
ATRIAL RATE: 68 BPM
BASOPHILS # BLD AUTO: 0.03 X10*3/UL (ref 0–0.1)
BASOPHILS NFR BLD AUTO: 0.5 %
BILIRUB UR STRIP.AUTO-MCNC: NEGATIVE MG/DL
BUN SERPL-MCNC: 34 MG/DL (ref 6–23)
CALCIUM SERPL-MCNC: 9.6 MG/DL (ref 8.6–10.3)
CHLORIDE SERPL-SCNC: 106 MMOL/L (ref 98–107)
CO2 SERPL-SCNC: 30 MMOL/L (ref 21–32)
COLOR UR: YELLOW
CREAT SERPL-MCNC: 0.77 MG/DL (ref 0.5–1.3)
EGFRCR SERPLBLD CKD-EPI 2021: >90 ML/MIN/1.73M*2
EOSINOPHIL # BLD AUTO: 0.07 X10*3/UL (ref 0–0.7)
EOSINOPHIL NFR BLD AUTO: 1.1 %
ERYTHROCYTE [DISTWIDTH] IN BLOOD BY AUTOMATED COUNT: 13.5 % (ref 11.5–14.5)
GLUCOSE SERPL-MCNC: 93 MG/DL (ref 74–99)
GLUCOSE UR STRIP.AUTO-MCNC: NORMAL MG/DL
HCT VFR BLD AUTO: 49.4 % (ref 41–52)
HGB BLD-MCNC: 16.3 G/DL (ref 13.5–17.5)
HOLD SPECIMEN: NORMAL
IMM GRANULOCYTES # BLD AUTO: 0.03 X10*3/UL (ref 0–0.7)
IMM GRANULOCYTES NFR BLD AUTO: 0.5 % (ref 0–0.9)
INR PPP: 1 (ref 0.9–1.1)
KETONES UR STRIP.AUTO-MCNC: ABNORMAL MG/DL
LEUKOCYTE ESTERASE UR QL STRIP.AUTO: NEGATIVE
LYMPHOCYTES # BLD AUTO: 1.49 X10*3/UL (ref 1.2–4.8)
LYMPHOCYTES NFR BLD AUTO: 23 %
MCH RBC QN AUTO: 29.8 PG (ref 26–34)
MCHC RBC AUTO-ENTMCNC: 33 G/DL (ref 32–36)
MCV RBC AUTO: 90 FL (ref 80–100)
MONOCYTES # BLD AUTO: 0.38 X10*3/UL (ref 0.1–1)
MONOCYTES NFR BLD AUTO: 5.9 %
MUCOUS THREADS #/AREA URNS AUTO: NORMAL /LPF
NEUTROPHILS # BLD AUTO: 4.49 X10*3/UL (ref 1.2–7.7)
NEUTROPHILS NFR BLD AUTO: 69 %
NITRITE UR QL STRIP.AUTO: NEGATIVE
NRBC BLD-RTO: 0 /100 WBCS (ref 0–0)
P AXIS: 47 DEGREES
P OFFSET: 185 MS
P ONSET: 137 MS
PH UR STRIP.AUTO: 5 [PH]
PLATELET # BLD AUTO: 228 X10*3/UL (ref 150–450)
POTASSIUM SERPL-SCNC: 4.2 MMOL/L (ref 3.5–5.3)
PR INTERVAL: 160 MS
PROT UR STRIP.AUTO-MCNC: NEGATIVE MG/DL
PROTHROMBIN TIME: 11.2 SECONDS (ref 9.8–12.8)
Q ONSET: 217 MS
QRS COUNT: 11 BEATS
QRS DURATION: 80 MS
QT INTERVAL: 376 MS
QTC CALCULATION(BAZETT): 399 MS
QTC FREDERICIA: 392 MS
R AXIS: 72 DEGREES
RBC # BLD AUTO: 5.47 X10*6/UL (ref 4.5–5.9)
RBC # UR STRIP.AUTO: ABNORMAL MG/DL
RBC #/AREA URNS AUTO: NORMAL /HPF
RH FACTOR (ANTIGEN D): NORMAL
SODIUM SERPL-SCNC: 141 MMOL/L (ref 136–145)
SP GR UR STRIP.AUTO: 1.03
T AXIS: 53 DEGREES
T OFFSET: 405 MS
UROBILINOGEN UR STRIP.AUTO-MCNC: NORMAL MG/DL
VENTRICULAR RATE: 68 BPM
WBC # BLD AUTO: 6.5 X10*3/UL (ref 4.4–11.3)
WBC #/AREA URNS AUTO: NORMAL /HPF

## 2025-02-13 PROCEDURE — 83036 HEMOGLOBIN GLYCOSYLATED A1C: CPT

## 2025-02-13 PROCEDURE — 86901 BLOOD TYPING SEROLOGIC RH(D): CPT

## 2025-02-13 PROCEDURE — 99204 OFFICE O/P NEW MOD 45 MIN: CPT | Performed by: PHYSICIAN ASSISTANT

## 2025-02-13 PROCEDURE — 81001 URINALYSIS AUTO W/SCOPE: CPT

## 2025-02-13 PROCEDURE — 80048 BASIC METABOLIC PNL TOTAL CA: CPT

## 2025-02-13 PROCEDURE — 93010 ELECTROCARDIOGRAM REPORT: CPT | Performed by: INTERNAL MEDICINE

## 2025-02-13 PROCEDURE — 86900 BLOOD TYPING SEROLOGIC ABO: CPT

## 2025-02-13 PROCEDURE — 85025 COMPLETE CBC W/AUTO DIFF WBC: CPT

## 2025-02-13 PROCEDURE — 93005 ELECTROCARDIOGRAM TRACING: CPT | Performed by: PHYSICIAN ASSISTANT

## 2025-02-13 PROCEDURE — 86850 RBC ANTIBODY SCREEN: CPT

## 2025-02-13 PROCEDURE — 85610 PROTHROMBIN TIME: CPT

## 2025-02-13 PROCEDURE — 87081 CULTURE SCREEN ONLY: CPT | Mod: AHULAB | Performed by: PHYSICIAN ASSISTANT

## 2025-02-13 PROCEDURE — 85730 THROMBOPLASTIN TIME PARTIAL: CPT

## 2025-02-13 RX ORDER — CHLORHEXIDINE GLUCONATE ORAL RINSE 1.2 MG/ML
SOLUTION DENTAL
Qty: 475 ML | Refills: 0 | Status: SHIPPED | OUTPATIENT
Start: 2025-02-13

## 2025-02-13 ASSESSMENT — ENCOUNTER SYMPTOMS
SHORTNESS OF BREATH: 0
FEVER: 0
ABDOMINAL PAIN: 0
SKIN CHANGES: 0
CONFUSION: 0
DOUBLE VISION: 0
DYSURIA: 0
NECK STIFFNESS: 0
UNEXPECTED WEIGHT CHANGE: 0
COUGH: 0
MYALGIAS: 0
DIARRHEA: 0
CONSTIPATION: 0
NUMBNESS: 0
ARTHRALGIAS: 0
EXCESSIVE BLEEDING: 0
DYSPNEA WITH EXERTION: 0
NECK PAIN: 0
DYSPNEA AT REST: 0
ABDOMINAL DISTENTION: 0
DIFFICULTY URINATING: 0
LIMITED RANGE OF MOTION: 0
SINUS CONGESTION: 0
HEMOPTYSIS: 0
TREMORS: 1
LIGHT-HEADEDNESS: 0
EYE PAIN: 0
BLOOD IN STOOL: 0
WEAKNESS: 0
NAUSEA: 0
RHINORRHEA: 0
VISUAL CHANGE: 0
BRUISES/BLEEDS EASILY: 0
EYE DISCHARGE: 0
WOUND: 0
VOMITING: 0
WHEEZING: 0
PALPITATIONS: 0
TROUBLE SWALLOWING: 0
CHILLS: 0

## 2025-02-13 NOTE — PREPROCEDURE INSTRUCTIONS
Medication List            Accurate as of February 13, 2025  9:06 AM. Always use your most recent med list.                atorvastatin 10 mg tablet  Commonly known as: Lipitor  Medication Adjustments for Surgery: Take on the morning of surgery     * carbidopa-levodopa  mg tablet  Commonly known as: Sinemet  Take 1 tablet by mouth 4 times a day.  Medication Adjustments for Surgery: Take/Use as prescribed     * carbidopa-levodopa  mg ER tablet  Commonly known as: Sinemet CR  Take 1 tab at 10pm bedtime x 2 weeks if needed take 2 tabs at bedtime  Medication Adjustments for Surgery: Take/Use as prescribed     chlorhexidine 0.12 % solution  Commonly known as: Peridex  Swish for 30 seconds and spit 15mL of solution the night before and morning of surgery  Medication Adjustments for Surgery: Take/Use as prescribed     entacapone 200 mg tablet  Commonly known as: Comtan  Take 1 tablet (200 mg) by mouth 4 times a day. Take with carbidopa/levodopa  Medication Adjustments for Surgery: Take/Use as prescribed     LORazepam 1 mg tablet  Commonly known as: Ativan  Take 1 tablet 1 hour prior to your MRI. May repeat 15-30 minutes prior to your MRI.  Medication Adjustments for Surgery: Take/Use as prescribed           * This list has 2 medication(s) that are the same as other medications prescribed for you. Read the directions carefully, and ask your doctor or other care provider to review them with you.                              **Concerning above medication instructions, if medication is normally taken at night, continue normal schedule.**  **DO NOT TAKE NIGHT PRIOR AND MORNING OF SURGERY**    CONTACT SURGEON'S OFFICE IF YOU DEVELOP:  * Fever = 100.4 F   * New respiratory symptoms (e.g. cough, shortness of breath, respiratory distress, sore throat)  * Recent loss of taste or smell  *Flu like symptoms such as headache, fatigue or gastrointestinal symptoms  * You develop any open sores, shingles, burning or painful  urination   AND/OR:  * You no longer wish to have the surgery.  * Any other personal circumstances change that may lead to the need to cancel or defer this surgery.  *You were admitted to any hospital within one week of your planned procedure.    SMOKING:  *Quitting smoking can make a huge difference to your health and recovery from surgery.    *If you need help with quitting, call 8-952-QUIT-NOW.    THE DAY OF SURGERY:  *Do not eat any food after midnight the night before surgery.   *You must drink 13.5 ounces of clear liquids (i.e. water, black coffee (no milk or cream), tea, apple juice or electrolyte drinks (gatorade)) 2 hours before your arrival time.  *You may chew gum until 2 hours before your surgery    SURGICAL TIME  *You will be contacted between 2 p.m. and 6 p.m. the business day before your surgery with your arrival time.  *If you haven't received a call by 6pm, call [phone].  *Scheduled surgery times may change and you will be notified if this occurs-check your personal voicemail for any updates.    ON THE MORNING OF SURGERY:  *Wear comfortable, loose fitting clothing.   *Do not use moisturizers, creams, lotions or perfume.  *All jewelry and valuables should be left at home.  *Prosthetic devices such as contact lenses, hearing aids, dentures, eyelash extensions, hairpins and body piercing must be removed before surgery.    BRING WITH YOU:  *Photo ID and insurance card  *Current list of medicines and allergies  *Pacemaker/Defibrillator/Heart stent cards  *CPAP machine and mask  *Slings/splints/crutches  *Copy of your complete Advanced Directive/DHPOA-if applicable  *Neurostimulator implant remote    PARKING AND ARRIVAL:  *Check in at the Main Entrance desk and let them know you are here for surgery.  *You will be directed to the 2nd floor surgical waiting area.    AFTER OUTPATIENT SURGERY:  *A responsible adult MUST accompany you at the time of discharge and stay with you for 24 hours after your  surgery.  *You may NOT drive yourself home after surgery.  *You may use a taxi or ride sharing service ([employer], [employer]) to return home ONLY if you are accompanied by a friend or family member.  *Instructions for resuming your medications will be provided by your surgeon.         Patient Information: Oral/Dental Rinse  **This is a prescription; pick it up at your preferred local pharmacy **  What is oral/dental rinse?   It is a mouthwash. It is a way of cleaning the mouth with a germ killing solution before your surgery.  The solution contains chlorhexidine, commonly known as CHG.   It is used inside the mouth to kill a bacteria known as Staphylococcus aureus.  Let your doctor know if you are allergic to Chlorhexidine.    Why do I need to use CHG oral/dental rinse?  The CHG oral/dental rinse helps to kill a bacteria in your mouth known a Staphylococcus aureus.     This reduces the risk of infection at the surgical site.      Using your CHG oral/dental rinse  STEPS:  Use your CHG oral/dental rinse after you brush your teeth the night before (at bedtime) and the morning of your surgery.  Follow all directions on your prescription label.    Use the cap on the container to measure 15ml (fill cap to fill line)  Swish (gargle if you can) the mouthwash in your mouth for at least 30 seconds, (do not to swallow) spit out  After you use your CHG rinse, do not rinse your mouth with water, drink or eat.  Please refer to prescription label for the appropriate time to resume oral intake  Dental rinse comes in one size bottle: 473ml ~16oz.  You will have leftover    rinse, discard after this use.    What side effects might I have using the CHG oral/dental rinse?  CHG rinse will stick to plaque on the teeth.  Brush and floss just before use.  Teeth brushing will help avoid staining of plaque during use.    Who should I contact if I have questions about the CHG oral/dental rinse?  Please call [facility],  Preadmission Testing at [phone] if you have any questions      Home Preoperative Antibacterial Shower     What is a home preoperative antibacterial shower?  This shower is a way of cleaning the skin with a germ killing soap before surgery.  The soap contains chlorhexidine, commonly known as CHG.  CHG is a soap for your skin with germ killing ability.  Let your doctor know if you are allergic to chlorhexidine.    Why do I need to take a preoperative antibacterial shower?  Skin is not sterile.  It is best to try to make your skin as free of germs as possible before surgery.  Proper cleansing with a germ killing soap before surgery can lower the number of germs on your skin.  This helps to reduce the risk of infection at the surgical site.  Following the instructions listed below will help you prepare your skin for surgery.      How do I use the CHG skin cleanser?  Steps:  Begin using your CHG soap five days before your scheduled surgery on ________________________.    Days 1-4 Shower before bed:  Wash your face and genitals with your normal soap and rinse.           2.    Apply the CHG soap to a clean wet washcloth.  Turn the water off or move away                From the water spray to avoid premature rinsing of the CHG soap as you are applying.     3.   Lather your entire body from the neck down.  Do not use on your face or genitals.  4. Pay special attention to the area(s) where your incision(s) will be located unless they are on your face.  Avoid scrubbing your skin too hard.  The important point is to have the CHG soap sit on your skin for 3 minutes.    When the 3 minutes are up, turn on the water and rinse the CHG soap off your body completely.   Pat yourself dry with a clean, freshly-laundered towel.  Dress in clean, freshly laundered night clothes.    Be sure to sleep with clean, freshly laundered sheets.  Day 5:  Last shower is the morning before surgery: Follow above Instructions.    NOTE:        *Keep  CHG soap out of eyes and ear canals   *DO NOT wash with regular soap on your body after you have used the CHG soap solution  *DO NOT apply powders, lotions, or perfume.  *Deodorant may be used days 1-4, BUT NOT the day of surgery    Who should I contact if I have any questions regarding the use of CHG soap?  Call the [facility], Preadmission Testing at [phone] if you have any questions.              Patient Information: Pre-Operative Infection Prevention Measures     Why did I have my nose, under my arms and groin swabbed?  The purpose of the swab is to identify Staphylococcus aureus inside your nose or on your skin.  The swab was sent to the laboratory for culture.  A positive swab/culture for Staphylococcus aureus is called colonization or carriage.      What is Staphylococcus aureus?  Staphylococcus aureus, also known as “staph”, is a germ found on the skin or in the nose of healthy people.  Sometimes Staphylococcus aureus can get into the body and cause an infection.  This can be minor (such as pimples, boils or other skin problems).  It might also be serious (such as blood infection, pneumonia or a surgical site infection).    What is Staphylococcus aureus colonization or carriage?  Colonization or carriage means that a person has the germ but is not sick from it.  These bacteria can be spread on the hands or when breathing or sneezing.    How is Staphylococcus aureus spread?  It is most often spread by close contact with a person or item that carries it.    What happens if my culture is positive for Staphylococcus aureus?  Your doctor/medical team will use this information to guide any antibiotic treatment which may be necessary.  Regardless of the culture results, we will clean the inside of your nose with a betadine swab just before you have your surgery.      Will I get an infection if I have Staphylococcus aureus in my nose or on my skin?  Anyone can get an infection  with Staphylococcus aureus.  However, the best way to reduce your risk of infection is to follow the instructions provided to you for the use of your CHG soap and dental rinse.        Who should I contact if I have any questions?  Call the [facility], Preadmission Testing at [phone] if you have any questions.          Incentive Spirometer   You were provided with an incentive spirometer in CPM/PAT, please follow the below instructions.   You were not provided an incentive spirometer in CPM, please disregard the incentive spirometer instructions  What is an incentive spirometer?  An incentive spirometer is a device used before and after surgery to “exercise” your lungs.  It helps you to take deeper breaths to expand your lungs.  Below is an example of a basic incentive spirometer.  The device you receive may differ slightly but they all function the same.    Why do I need to use an incentive spirometer?  Using your incentive spirometer prepares your lungs for surgery and helps prevent lung problems after surgery.  How do I use my incentive spirometer?  When you're using your incentive spirometer, make sure to breathe through your mouth. If you breathe through your nose, the incentive spirometer won't work properly. You can hold your nose if you have trouble.  If you feel dizzy at any time, stop and rest. Try again at a later time.  Follow the steps below:  Set up your incentive spirometer, expand the flexible tubing and connect to the outlet.  Sit upright in a chair or bed. Hold the incentive spirometer at eye level.   Put the mouthpiece in your mouth and close your lips tightly around it. Slowly breathe out (exhale) completely.  Breathe in (inhale) slowly through your mouth as deeply as you can. As you take a breath, you will see the piston rise inside the large column. While the piston rises, the indicator should move upwards. It should stay in between the 2 arrows (see  Figure).  Try to get the piston as high as you can, while keeping the indicator between the arrows.   If the indicator doesn't stay between the arrows, you're breathing either too fast or too slow.  When you get it as high as you can, hold your breath for 10 seconds, or as long as possible. While you're holding your breath, the piston will slowly fall to the base of the spirometer.  Once the piston reaches the bottom of the spirometer, breathe out slowly through your mouth. Rest for a few seconds.  Repeat 10 times. Try to get the piston to the same level with each breath.  Repeat every hour while awake  You can carefully clean the outside of the mouthpiece with an alcohol wipe or soap and water.        Preoperative Deep Breathing Exercises  Why it is important to do deep breathing exercises before my surgery?  Deep breathing exercises strengthen your breathing muscles.  This helps you to recover after your surgery and decreases the chance of breathing complications.  How are the deep breathing exercises done?  Sit straight with your back supported.  Breathe in deeply and slowly through your nose. Your lower rib cage should expand and your abdomen may move forward.  Hold that breath for 3 to 5 seconds.  Breathe out through pursed lips, slowly and completely.  Rest and repeat 10 times every hour while awake.  Rest longer if you become dizzy or lightheaded.        Preoperative Brain Exercises    What are brain exercises?  A brain exercise is any activity that engages your thinking (cognitive) skills.    What types of activities are considered brain exercises?  Jigsaw puzzles, crossword puzzles, word jumble, memory games, word search, and many more.  Many can be found free online or on your phone via a mobile asaf.    Why should I do brain exercises before my surgery?  More recent research has shown brain exercise before surgery can lower the risk of postoperative delirium (confusion) which can be especially important  for older adults.  Patients who did brain exercises for 5 to 10 hours the days before surgery, cut their risk of postoperative delirium in half up to 1 week after surgery.

## 2025-02-13 NOTE — CPM/PAT NURSE NOTE
CPM/PAT Nurse Note      Name: Hayden Hester (Samir Rabil)  /Age: 1955/69 y.o.       Past Medical History:   Diagnosis Date    Hyperlipidemia, unspecified     Diet-controlled hyperlipidemia    Memory loss or impairment     Parkinson's disease (Multi)        Past Surgical History:   Procedure Laterality Date    COLONOSCOPY      HERNIA REPAIR Right     Inguinal Hernia Repair       Patient  has no history on file for sexual activity.    Family History   Problem Relation Name Age of Onset    Dementia Mother      Heart attack Father         No Known Allergies    Prior to Admission medications    Medication Sig Start Date End Date Taking? Authorizing Provider   atorvastatin (Lipitor) 10 mg tablet Take by mouth once daily.    Historical Provider, MD   carbidopa-levodopa (Sinemet CR)  mg ER tablet Take 1 tab at 10pm bedtime x 2 weeks if needed take 2 tabs at bedtime 24   Marisela Kunz MD   carbidopa-levodopa (Sinemet)  mg tablet Take 1 tablet by mouth 4 times a day.  Patient taking differently: Take 1 tablet by mouth 6 times a day. 24  Chano Matthews MD   chlorhexidine (Peridex) 0.12 % solution Swish for 30 seconds and spit 15mL of solution the night before and morning of surgery 25   Nisha Pop PA-C   entacapone (Comtan) 200 mg tablet Take 1 tablet (200 mg) by mouth 4 times a day. Take with carbidopa/levodopa 24  Chano Matthews MD   LORazepam (Ativan) 1 mg tablet Take 1 tablet 1 hour prior to your MRI. May repeat 15-30 minutes prior to your MRI.  Patient not taking: Reported on 2025   Anila Monahan, APRN-CNP        ROC ROS     DASI Risk Score    No data to display       Caprini DVT Assessment    No data to display       Modified Frailty Index    No data to display       CHADS2 Stroke Risk  Current as of 4 minutes ago        N/A 3 to 100%: High Risk   2 to < 3%: Medium Risk   0 to < 2%: Low Risk     Last Change: N/A           This score determines the patient's risk of having a stroke if the patient has atrial fibrillation.        This score is not applicable to this patient. Components are not calculated.          Revised Cardiac Risk Index    No data to display       Apfel Simplified Score    No data to display       Risk Analysis Index Results This Encounter    No data found in the last 10 encounters.       Prodigy: High Risk  Total Score: 16              Prodigy Age Score      Prodigy Gender Score          ARISCAT Score for Postoperative Pulmonary Complications      Flowsheet Row Pre-Admission Testing from 2/13/2025 in [facility] with Nisha Pop PA-C   Age Calculated Score 3 filed at 02/13/2025 0910   Preoperative SpO2 0 filed at 02/13/2025 0910   Respiratory infection in the last month Either upper or lower (i.e., URI, bronchitis, pneumonia), with fever and antibiotic treatment 0 filed at 02/13/2025 0910   Preoperative anemia (Hgb less than 10 g/dl) 0 filed at 02/13/2025 0910   Surgical incision  0 filed at 02/13/2025 0910   Duration of surgery  23 filed at 02/13/2025 0910   Emergency Procedure  0 filed at 02/13/2025 0910   ARISCAT Total Score  26 filed at 02/13/2025 0910          Dirk Perioperative Risk for Myocardial Infarction or Cardiac Arrest (SCARLET)    No data to display         Nurse Plan of Action:         After Visit Summary (AVS) reviewed and patient verbalized good understanding of medications and NPO instructions.  Pre-op infection prevention measures:  CHG showers and mouthwash reviewed, understanding voiced.  CHG soap given and patient verbalized need to pick CHG mouthwash at their preferred local pharmacy.

## 2025-02-13 NOTE — H&P (VIEW-ONLY)
[facility]/PAT Evaluation       Name: Hayden Hester (Hayden Hester)  /Age: 1955/69 y.o.       Date of Consult: 25    Referring Provider: Dr. Fry    Surgery, Date, and Length: Bilateral Subthalamic Nucleus Deep Brain Stimulator Electrodes and Generator Placement - Bilateral , 25, 360MIN    Hayden Hester is a 69 y.o. year-old male who presents to the [facility] for perioperative risk assessment prior to surgery.    Patient presents with a primary diagnosis of parkinson's disease. right-handed male with PD since , with symptoms of RUE tremor and jaw tremor, bradykinesia, rigidity, and motor fluctuations. He is also eager to be treated with dbs for his debilitating PD-related symptoms. Pt denies any current dysuria, f/c/n/v. He takes medications for his PD symptoms which seem to be helping.     This note was created in part upon personal review of patient's medical records.      Patient is scheduled to have Bilateral Subthalamic Nucleus Deep Brain Stimulator Electrodes and Generator Placement - Bilateral      Pt denies any past history of anesthetic complications such as PONV, awareness, prolonged sedation, dental damage, aspiration, cardiac arrest, difficult intubation, difficult I.V. access or unexpected hospital admissions.  NO malignant hyperthermia and or pseudocholinesterase deficiency.  No history of blood transfusions     The patient is not a Yazidi and will accept blood and blood products if medically indicated.   Type and screen sent.     Past Medical History:   Diagnosis Date    Hyperlipidemia, unspecified     Diet-controlled hyperlipidemia    Memory loss or impairment     Parkinson's disease (Multi)        Past Surgical History:   Procedure Laterality Date    COLONOSCOPY      HERNIA REPAIR Right     Inguinal Hernia Repair       Patient  has no history on file for sexual activity.    Family History   Problem Relation Name Age of Onset    Dementia Mother      Heart attack Father          Social History     Socioeconomic History    Marital status:      Spouse name: Not on file    Number of children: Not on file    Years of education: Not on file    Highest education level: Not on file   Occupational History    Not on file   Tobacco Use    Smoking status: Former     Current packs/day: 0.00     Average packs/day: 0.1 packs/day for 6.0 years (0.6 ttl pk-yrs)     Types: Cigarettes     Start date:      Quit date:      Years since quittin.1    Smokeless tobacco: Never   Vaping Use    Vaping status: Never Used   Substance and Sexual Activity    Alcohol use: Not Currently     Comment: rare    Drug use: Never    Sexual activity: Not on file   Other Topics Concern    Not on file   Social History Narrative    Not on file     Social Drivers of Health     Financial Resource Strain: Not on file   Food Insecurity: Not on file   Transportation Needs: Not on file   Physical Activity: Not on file   Stress: Not on file   Social Connections: Not on file   Intimate Partner Violence: Not on file   Housing Stability: Not on file        No Known Allergies    Current Outpatient Medications   Medication Instructions    atorvastatin (Lipitor) 10 mg tablet Daily    carbidopa-levodopa (Sinemet CR)  mg ER tablet Take 1 tab at 10pm bedtime x 2 weeks if needed take 2 tabs at bedtime    carbidopa-levodopa (Sinemet)  mg tablet 1 tablet, oral, 4 times daily    chlorhexidine (Peridex) 0.12 % solution Swish for 30 seconds and spit 15mL of solution the night before and morning of surgery    entacapone (COMTAN) 200 mg, oral, 4 times daily, Take with carbidopa/levodopa    LORazepam (Ativan) 1 mg tablet Take 1 tablet 1 hour prior to your MRI. May repeat 15-30 minutes prior to your MRI.           PAT ROS:   Constitutional:    no fever   no chills   no unexpected weight change  Neuro/Psych:    no numbness   no weakness   no light-headedness   tremors   no confusion  Eyes:    no discharge   no pain   no  vision loss   no diplopia   no visual disturbance  Ears:    no ear pain   no hearing loss   no tinnitus  Nose:    no nasal discharge   no sinus congestion   no epistaxis  Mouth:    no dental issues   no mouth pain   no oral bleeding   no mouth lesions  Throat:    no throat pain   no dysphagia  Neck:    no neck pain   no neck stiffness  Cardio:    Functional 4 Mets. Patient denies SOB walking up 1 flights of stairs   PD exercises / PT; cooking, grocery shopping    no chest pain   no palpitations   no peripheral edema   no dyspnea   no FRIEDMAN  Respiratory:    no cough   no wheezing   no hemoptysis   no shortness of breath  Endocrine:    no cold intolerance   no heat intolerance  GI:    no abdominal distention   no abdominal pain   no constipation   no diarrhea   no nausea   no vomiting   no blood in stool  :    no difficulty urinating   no dysuria   no oliguria  Musculoskeletal:    no arthralgias   no myalgias   no decreased ROM  Hematologic:    does not bruise/bleed easily   no excessive bleeding   no history of blood transfusion   no blood clots  Skin:   no skin changes   no sores/wound   no rash      Physical Exam  Constitutional:       General: He is not in acute distress.     Appearance: Normal appearance. He is not ill-appearing, toxic-appearing or diaphoretic.   HENT:      Head: Normocephalic and atraumatic.      Nose: Nose normal. No congestion or rhinorrhea.      Mouth/Throat:      Mouth: Mucous membranes are moist.      Pharynx: No posterior oropharyngeal erythema.   Eyes:      Extraocular Movements: Extraocular movements intact.      Conjunctiva/sclera: Conjunctivae normal.   Cardiovascular:      Rate and Rhythm: Normal rate and regular rhythm.      Pulses: Normal pulses.      Heart sounds: Normal heart sounds. No murmur heard.     No friction rub. No gallop.   Pulmonary:      Effort: Pulmonary effort is normal. No respiratory distress.      Breath sounds: Normal breath sounds. No stridor. No wheezing,  "rhonchi or rales.   Abdominal:      General: Bowel sounds are normal. There is no distension.      Palpations: Abdomen is soft. There is no mass.      Tenderness: There is no abdominal tenderness. There is no guarding or rebound.      Hernia: No hernia is present.   Musculoskeletal:         General: No swelling, tenderness, deformity or signs of injury. Normal range of motion.      Cervical back: Normal range of motion and neck supple. No rigidity or tenderness.   Skin:     General: Skin is warm and dry.      Coloration: Skin is not jaundiced or pale.      Findings: No bruising, erythema, lesion or rash.   Neurological:      General: No focal deficit present.      Mental Status: He is alert and oriented to person, place, and time.      Cranial Nerves: No cranial nerve deficit.      Sensory: No sensory deficit.      Motor: No weakness.      Coordination: Coordination normal.      Comments: RUE and mandibular tremors noted at rest   Psychiatric:         Mood and Affect: Mood normal.         Behavior: Behavior normal.          PAT AIRWAY:   Airway:     Mallampati::  III    Neck ROM::  Full   Few missing teeth; no loose teeth        Visit Vitals  /84   Pulse 69   Temp 36.2 °C (97.1 °F)   Resp 16   Ht 1.65 m (5' 4.96\")   Wt 62 kg (136 lb 11 oz)   SpO2 97%   BMI 22.77 kg/m²   Smoking Status Former   BSA 1.69 m²      LABS:  Lab Results   Component Value Date    WBC 6.5 02/13/2025    HGB 16.3 02/13/2025    HCT 49.4 02/13/2025    MCV 90 02/13/2025     02/13/2025      Lab Results   Component Value Date    GLUCOSE 93 02/13/2025    CALCIUM 9.6 02/13/2025     02/13/2025    K 4.2 02/13/2025    CO2 30 02/13/2025     02/13/2025    BUN 34 (H) 02/13/2025    CREATININE 0.77 02/13/2025      Lab Results   Component Value Date    INR 1.0 02/13/2025    PROTIME 11.2 02/13/2025      Lab Results   Component Value Date    HGBA1C 5.1 02/13/2025        Urinalysis with Reflex Culture and Microscopic  Order: [id number] - " Part of Panel Order [id number]   Status: Final result       Visible to patient: Yes (seen)       Dx: Preop testing; Urinary symptom or sign    0 Result Notes      Component  Ref Range & Units 09:33   Color, Urine  Light-Yellow, Yellow, Dark-Yellow Yellow   Appearance, Urine  Clear Clear   Specific Gravity, Urine  1.005 - 1.035 1.033   pH, Urine  5.0, 5.5, 6.0, 6.5, 7.0, 7.5, 8.0 5.0   Protein, Urine  NEGATIVE, 10 (TRACE), 20 (TRACE) mg/dL NEGATIVE   Glucose, Urine  Normal mg/dL Normal   Blood, Urine  NEGATIVE mg/dL 0.06 (1+) Abnormal    Ketones, Urine  NEGATIVE mg/dL 10 (1+) Abnormal    Bilirubin, Urine  NEGATIVE mg/dL NEGATIVE   Urobilinogen, Urine  Normal mg/dL Normal   Nitrite, Urine  NEGATIVE NEGATIVE   Leukocyte Esterase, Urine  NEGATIVE NEGATIVE   Resulting Agency [facility]             Specimen Collected: 02/13/25 09:33     Urinalysis Microscopic  Order: [id number] - Reflex for Order [id number]   Status: Final result       Visible to patient: Yes (seen)       Dx: Preop testing; Urinary symptom or sign    0 Result Notes      Component  Ref Range & Units 09:33   WBC, Urine  1-5, NONE /HPF 1-5   RBC, Urine  NONE, 1-2, 3-5 /HPF 1-2   Mucus, Urine  Reference range not established. /LPF 3+   Resulting Agency [facility]             Specimen Collected: 02/13/25 09:33     EKG 2/13/25  NSR  Normal EKG  Vent rate = 68 bpm    Assessment and Plan:     69 y.o.  male  scheduled for Bilateral Subthalamic Nucleus Deep Brain Stimulator Electrodes and Generator Placement - Bilateral on 2/27/25 with Dr. Fry for  Parkinson's disease.   P[facility] to [facility] for perioperative risk stratification and optimization      Cardiovascular:  Patient has no active cardiac symptoms.   Patient denies any chest pain, tightness, heaviness, pressure, radiating pain, palpitations, irregular heartbeats, lightheadedness, cough, congestion, shortness of breath, FRIEDMAN, PND, near syncope, weight loss or gain.    METS: 4+  RCRI: 0 points, 3.9%  risk for postoperative  MACE     HLD - cont statin on dos     Pulmonary:  No pulmonary diagnosis, however patient is at increased risk of perioperative complications secondary to  age > 60, site of surgery, major surgery, duration of surgery > 2 hours  Stop Bang score is 2 placing patient at low risk for KAYLI  ARISCAT: 26-44 points, 13.3% risk of in-hospital postoperative pulmonary complication  PRODIGY: High risk for opioid induced respiratory depression    **Pt provided with deep breathing exercises, incentive spirometer and instructions for use during PAT visit today**      Neuro:  Pt with diagnosis of parkinson's with multiple neurological symptoms along with the following risk factors for postop delirium:  age, decreased functional status, type and duration of surgery, Patient instructed on and provided cognitive exercises    Hematology:  Patient instructed to ambulate as soon as possible postoperatively to decrease thromboembolic risk.   Initiate mechanical DVT prophylaxis as soon as possible and initiate chemical prophylaxis when deemed safe from a bleeding standpoint post surgery.     LABS: CBC, BMP, coag, T&S, MRSA, A1c, UA flex and EKG ordered. Lab results reviewed and unremarkable.    Followup: MRSA and A1c pending    Addendum 2/17/25:  A1c results reviewed and unremarkable    Caprini: 4    Risk assessment complete.  Patient is scheduled for a intermediate to high surgical risk procedure.        Preoperative medication instructions were provided and reviewed with the patient.  Any additional testing or evaluation was explained to the patient.  Nothing by mouth instructions were discussed and patient's questions were answered prior to conclusion to this encounter.  Patient verbalized understanding of preoperative instructions given in preadmission testing; discharge instructions available in EMR.    This note was dictated by a speech recognition.  Minor errors may have been detected in a speech recognition.

## 2025-02-13 NOTE — CPM/PAT H&P
[facility]/PAT Evaluation       Name: Hayden Hester (Hayden Hester)  /Age: 1955/69 y.o.       Date of Consult: 25    Referring Provider: Dr. Fry    Surgery, Date, and Length: Bilateral Subthalamic Nucleus Deep Brain Stimulator Electrodes and Generator Placement - Bilateral , 25, 360MIN    Hayden Hester is a 69 y.o. year-old male who presents to the [facility] for perioperative risk assessment prior to surgery.    Patient presents with a primary diagnosis of parkinson's disease. right-handed male with PD since , with symptoms of RUE tremor and jaw tremor, bradykinesia, rigidity, and motor fluctuations. He is also eager to be treated with dbs for his debilitating PD-related symptoms. Pt denies any current dysuria, f/c/n/v. He takes medications for his PD symptoms which seem to be helping.     This note was created in part upon personal review of patient's medical records.      Patient is scheduled to have Bilateral Subthalamic Nucleus Deep Brain Stimulator Electrodes and Generator Placement - Bilateral      Pt denies any past history of anesthetic complications such as PONV, awareness, prolonged sedation, dental damage, aspiration, cardiac arrest, difficult intubation, difficult I.V. access or unexpected hospital admissions.  NO malignant hyperthermia and or pseudocholinesterase deficiency.  No history of blood transfusions     The patient is not a Uatsdin and will accept blood and blood products if medically indicated.   Type and screen sent.     Past Medical History:   Diagnosis Date    Hyperlipidemia, unspecified     Diet-controlled hyperlipidemia    Memory loss or impairment     Parkinson's disease (Multi)        Past Surgical History:   Procedure Laterality Date    COLONOSCOPY      HERNIA REPAIR Right     Inguinal Hernia Repair       Patient  has no history on file for sexual activity.    Family History   Problem Relation Name Age of Onset    Dementia Mother      Heart attack Father          Social History     Socioeconomic History    Marital status:      Spouse name: Not on file    Number of children: Not on file    Years of education: Not on file    Highest education level: Not on file   Occupational History    Not on file   Tobacco Use    Smoking status: Former     Current packs/day: 0.00     Average packs/day: 0.1 packs/day for 6.0 years (0.6 ttl pk-yrs)     Types: Cigarettes     Start date:      Quit date:      Years since quittin.1    Smokeless tobacco: Never   Vaping Use    Vaping status: Never Used   Substance and Sexual Activity    Alcohol use: Not Currently     Comment: rare    Drug use: Never    Sexual activity: Not on file   Other Topics Concern    Not on file   Social History Narrative    Not on file     Social Drivers of Health     Financial Resource Strain: Not on file   Food Insecurity: Not on file   Transportation Needs: Not on file   Physical Activity: Not on file   Stress: Not on file   Social Connections: Not on file   Intimate Partner Violence: Not on file   Housing Stability: Not on file        No Known Allergies    Current Outpatient Medications   Medication Instructions    atorvastatin (Lipitor) 10 mg tablet Daily    carbidopa-levodopa (Sinemet CR)  mg ER tablet Take 1 tab at 10pm bedtime x 2 weeks if needed take 2 tabs at bedtime    carbidopa-levodopa (Sinemet)  mg tablet 1 tablet, oral, 4 times daily    chlorhexidine (Peridex) 0.12 % solution Swish for 30 seconds and spit 15mL of solution the night before and morning of surgery    entacapone (COMTAN) 200 mg, oral, 4 times daily, Take with carbidopa/levodopa    LORazepam (Ativan) 1 mg tablet Take 1 tablet 1 hour prior to your MRI. May repeat 15-30 minutes prior to your MRI.           PAT ROS:   Constitutional:    no fever   no chills   no unexpected weight change  Neuro/Psych:    no numbness   no weakness   no light-headedness   tremors   no confusion  Eyes:    no discharge   no pain   no  vision loss   no diplopia   no visual disturbance  Ears:    no ear pain   no hearing loss   no tinnitus  Nose:    no nasal discharge   no sinus congestion   no epistaxis  Mouth:    no dental issues   no mouth pain   no oral bleeding   no mouth lesions  Throat:    no throat pain   no dysphagia  Neck:    no neck pain   no neck stiffness  Cardio:    Functional 4 Mets. Patient denies SOB walking up 1 flights of stairs   PD exercises / PT; cooking, grocery shopping    no chest pain   no palpitations   no peripheral edema   no dyspnea   no FRIEDMAN  Respiratory:    no cough   no wheezing   no hemoptysis   no shortness of breath  Endocrine:    no cold intolerance   no heat intolerance  GI:    no abdominal distention   no abdominal pain   no constipation   no diarrhea   no nausea   no vomiting   no blood in stool  :    no difficulty urinating   no dysuria   no oliguria  Musculoskeletal:    no arthralgias   no myalgias   no decreased ROM  Hematologic:    does not bruise/bleed easily   no excessive bleeding   no history of blood transfusion   no blood clots  Skin:   no skin changes   no sores/wound   no rash      Physical Exam  Constitutional:       General: He is not in acute distress.     Appearance: Normal appearance. He is not ill-appearing, toxic-appearing or diaphoretic.   HENT:      Head: Normocephalic and atraumatic.      Nose: Nose normal. No congestion or rhinorrhea.      Mouth/Throat:      Mouth: Mucous membranes are moist.      Pharynx: No posterior oropharyngeal erythema.   Eyes:      Extraocular Movements: Extraocular movements intact.      Conjunctiva/sclera: Conjunctivae normal.   Cardiovascular:      Rate and Rhythm: Normal rate and regular rhythm.      Pulses: Normal pulses.      Heart sounds: Normal heart sounds. No murmur heard.     No friction rub. No gallop.   Pulmonary:      Effort: Pulmonary effort is normal. No respiratory distress.      Breath sounds: Normal breath sounds. No stridor. No wheezing,  "rhonchi or rales.   Abdominal:      General: Bowel sounds are normal. There is no distension.      Palpations: Abdomen is soft. There is no mass.      Tenderness: There is no abdominal tenderness. There is no guarding or rebound.      Hernia: No hernia is present.   Musculoskeletal:         General: No swelling, tenderness, deformity or signs of injury. Normal range of motion.      Cervical back: Normal range of motion and neck supple. No rigidity or tenderness.   Skin:     General: Skin is warm and dry.      Coloration: Skin is not jaundiced or pale.      Findings: No bruising, erythema, lesion or rash.   Neurological:      General: No focal deficit present.      Mental Status: He is alert and oriented to person, place, and time.      Cranial Nerves: No cranial nerve deficit.      Sensory: No sensory deficit.      Motor: No weakness.      Coordination: Coordination normal.      Comments: RUE and mandibular tremors noted at rest   Psychiatric:         Mood and Affect: Mood normal.         Behavior: Behavior normal.          PAT AIRWAY:   Airway:     Mallampati::  III    Neck ROM::  Full   Few missing teeth; no loose teeth        Visit Vitals  /84   Pulse 69   Temp 36.2 °C (97.1 °F)   Resp 16   Ht 1.65 m (5' 4.96\")   Wt 62 kg (136 lb 11 oz)   SpO2 97%   BMI 22.77 kg/m²   Smoking Status Former   BSA 1.69 m²      LABS:  Lab Results   Component Value Date    WBC 6.5 02/13/2025    HGB 16.3 02/13/2025    HCT 49.4 02/13/2025    MCV 90 02/13/2025     02/13/2025      Lab Results   Component Value Date    GLUCOSE 93 02/13/2025    CALCIUM 9.6 02/13/2025     02/13/2025    K 4.2 02/13/2025    CO2 30 02/13/2025     02/13/2025    BUN 34 (H) 02/13/2025    CREATININE 0.77 02/13/2025      Lab Results   Component Value Date    INR 1.0 02/13/2025    PROTIME 11.2 02/13/2025        Urinalysis with Reflex Culture and Microscopic  Order: [id number] - Part of Panel Order [id number]   Status: Final result       Visible " to patient: Yes (seen)       Dx: Preop testing; Urinary symptom or sign    0 Result Notes      Component  Ref Range & Units 09:33   Color, Urine  Light-Yellow, Yellow, Dark-Yellow Yellow   Appearance, Urine  Clear Clear   Specific Gravity, Urine  1.005 - 1.035 1.033   pH, Urine  5.0, 5.5, 6.0, 6.5, 7.0, 7.5, 8.0 5.0   Protein, Urine  NEGATIVE, 10 (TRACE), 20 (TRACE) mg/dL NEGATIVE   Glucose, Urine  Normal mg/dL Normal   Blood, Urine  NEGATIVE mg/dL 0.06 (1+) Abnormal    Ketones, Urine  NEGATIVE mg/dL 10 (1+) Abnormal    Bilirubin, Urine  NEGATIVE mg/dL NEGATIVE   Urobilinogen, Urine  Normal mg/dL Normal   Nitrite, Urine  NEGATIVE NEGATIVE   Leukocyte Esterase, Urine  NEGATIVE NEGATIVE   Resulting Agency [facility]             Specimen Collected: 02/13/25 09:33     Urinalysis Microscopic  Order: [id number] - Reflex for Order [id number]   Status: Final result       Visible to patient: Yes (seen)       Dx: Preop testing; Urinary symptom or sign    0 Result Notes      Component  Ref Range & Units 09:33   WBC, Urine  1-5, NONE /HPF 1-5   RBC, Urine  NONE, 1-2, 3-5 /HPF 1-2   Mucus, Urine  Reference range not established. /LPF 3+   Resulting Agency [facility]             Specimen Collected: 02/13/25 09:33     EKG 2/13/25  NSR  Normal EKG  Vent rate = 68 bpm    Assessment and Plan:     69 y.o.  male  scheduled for Bilateral Subthalamic Nucleus Deep Brain Stimulator Electrodes and Generator Placement - Bilateral on 2/27/25 with Dr. Fry for  Parkinson's disease.   Presents to [facility] today for perioperative risk stratification and optimization      Cardiovascular:  Patient has no active cardiac symptoms.   Patient denies any chest pain, tightness, heaviness, pressure, radiating pain, palpitations, irregular heartbeats, lightheadedness, cough, congestion, shortness of breath, FRIEDMAN, PND, near syncope, weight loss or gain.    METS: 4+  RCRI: 0 points, 3.9%  risk for postoperative MACE     HLD - cont statin on dos     Pulmonary:  No pulmonary  diagnosis, however patient is at increased risk of perioperative complications secondary to  age > 60, site of surgery, major surgery, duration of surgery > 2 hours  Stop Bang score is 2 placing patient at low risk for KAYLI  ARISCAT: 26-44 points, 13.3% risk of in-hospital postoperative pulmonary complication  PRODIGY: High risk for opioid induced respiratory depression    **Pt provided with deep breathing exercises, incentive spirometer and instructions for use during PAT visit today**      Neuro:  Pt with diagnosis of parkinson's with multiple neurological symptoms along with the following risk factors for postop delirium:  age, decreased functional status, type and duration of surgery, Patient instructed on and provided cognitive exercises    Hematology:  Patient instructed to ambulate as soon as possible postoperatively to decrease thromboembolic risk.   Initiate mechanical DVT prophylaxis as soon as possible and initiate chemical prophylaxis when deemed safe from a bleeding standpoint post surgery.     LABS: CBC, BMP, coag, T&S, MRSA, A1c, UA flex and EKG ordered. Lab results reviewed and unremarkable.    Followup: MRSA and A1c pending    Caprini: 4    Risk assessment complete.  Patient is scheduled for a intermediate to high surgical risk procedure.        Preoperative medication instructions were provided and reviewed with the patient.  Any additional testing or evaluation was explained to the patient.  Nothing by mouth instructions were discussed and patient's questions were answered prior to conclusion to this encounter.  Patient verbalized understanding of preoperative instructions given in preadmission testing; discharge instructions available in EMR.    This note was dictated by a speech recognition.  Minor errors may have been detected in a speech recognition.

## 2025-02-14 LAB
EST. AVERAGE GLUCOSE BLD GHB EST-MCNC: 100 MG/DL
HBA1C MFR BLD: 5.1 %

## 2025-02-15 LAB — STAPHYLOCOCCUS SPEC CULT: ABNORMAL

## 2025-02-27 ENCOUNTER — APPOINTMENT (OUTPATIENT)
Dept: RADIOLOGY | Facility: HOSPITAL | Age: 70
DRG: 024 | End: 2025-02-27
Payer: MEDICARE

## 2025-02-27 ENCOUNTER — ANESTHESIA (OUTPATIENT)
Dept: OPERATING ROOM | Facility: HOSPITAL | Age: 70
DRG: 024 | End: 2025-02-27
Payer: MEDICARE

## 2025-02-27 ENCOUNTER — ANESTHESIA EVENT (OUTPATIENT)
Dept: OPERATING ROOM | Facility: HOSPITAL | Age: 70
DRG: 024 | End: 2025-02-27
Payer: MEDICARE

## 2025-02-27 ENCOUNTER — HOSPITAL ENCOUNTER (INPATIENT)
Facility: HOSPITAL | Age: 70
LOS: 1 days | Discharge: HOME | DRG: 024 | End: 2025-02-28
Attending: NEUROLOGICAL SURGERY | Admitting: NEUROLOGICAL SURGERY
Payer: MEDICARE

## 2025-02-27 DIAGNOSIS — G89.18 ACUTE POST-OPERATIVE PAIN: ICD-10-CM

## 2025-02-27 DIAGNOSIS — G20.B2 PARKINSON'S DISEASE WITH DYSKINESIA AND FLUCTUATING MANIFESTATIONS: Primary | ICD-10-CM

## 2025-02-27 LAB
ALBUMIN SERPL BCP-MCNC: 3.9 G/DL (ref 3.4–5)
ANION GAP SERPL CALC-SCNC: 12 MMOL/L (ref 10–20)
BUN SERPL-MCNC: 20 MG/DL (ref 6–23)
CALCIUM SERPL-MCNC: 8.9 MG/DL (ref 8.6–10.3)
CHLORIDE SERPL-SCNC: 105 MMOL/L (ref 98–107)
CO2 SERPL-SCNC: 26 MMOL/L (ref 21–32)
CREAT SERPL-MCNC: 0.75 MG/DL (ref 0.5–1.3)
EGFRCR SERPLBLD CKD-EPI 2021: >90 ML/MIN/1.73M*2
ERYTHROCYTE [DISTWIDTH] IN BLOOD BY AUTOMATED COUNT: 13.3 % (ref 11.5–14.5)
GLUCOSE SERPL-MCNC: 145 MG/DL (ref 74–99)
HCT VFR BLD AUTO: 42 % (ref 41–52)
HGB BLD-MCNC: 15.1 G/DL (ref 13.5–17.5)
MCH RBC QN AUTO: 30.3 PG (ref 26–34)
MCHC RBC AUTO-ENTMCNC: 36 G/DL (ref 32–36)
MCV RBC AUTO: 84 FL (ref 80–100)
NRBC BLD-RTO: 0 /100 WBCS (ref 0–0)
PHOSPHATE SERPL-MCNC: 2.6 MG/DL (ref 2.5–4.9)
PLATELET # BLD AUTO: 183 X10*3/UL (ref 150–450)
POTASSIUM SERPL-SCNC: 3.9 MMOL/L (ref 3.5–5.3)
RBC # BLD AUTO: 4.98 X10*6/UL (ref 4.5–5.9)
SODIUM SERPL-SCNC: 139 MMOL/L (ref 136–145)
WBC # BLD AUTO: 10 X10*3/UL (ref 4.4–11.3)

## 2025-02-27 PROCEDURE — 0JH60DZ INSERTION OF MULTIPLE ARRAY STIMULATOR GENERATOR INTO CHEST SUBCUTANEOUS TISSUE AND FASCIA, OPEN APPROACH: ICD-10-PCS | Performed by: NEUROLOGICAL SURGERY

## 2025-02-27 PROCEDURE — A4649 SURGICAL SUPPLIES: HCPCS | Performed by: NEUROLOGICAL SURGERY

## 2025-02-27 PROCEDURE — A61863 PR IMPLANT NEUROELECTRODE W/O RECORDING: Performed by: ANESTHESIOLOGIST ASSISTANT

## 2025-02-27 PROCEDURE — 2500000004 HC RX 250 GENERAL PHARMACY W/ HCPCS (ALT 636 FOR OP/ED)

## 2025-02-27 PROCEDURE — 2500000004 HC RX 250 GENERAL PHARMACY W/ HCPCS (ALT 636 FOR OP/ED): Performed by: ANESTHESIOLOGIST ASSISTANT

## 2025-02-27 PROCEDURE — 2500000001 HC RX 250 WO HCPCS SELF ADMINISTERED DRUGS (ALT 637 FOR MEDICARE OP): Performed by: ANESTHESIOLOGY

## 2025-02-27 PROCEDURE — C1889 IMPLANT/INSERT DEVICE, NOC: HCPCS | Performed by: NEUROLOGICAL SURGERY

## 2025-02-27 PROCEDURE — 36415 COLL VENOUS BLD VENIPUNCTURE: CPT

## 2025-02-27 PROCEDURE — C1883 ADAPT/EXT, PACING/NEURO LEAD: HCPCS | Performed by: NEUROLOGICAL SURGERY

## 2025-02-27 PROCEDURE — C1767 GENERATOR, NEURO NON-RECHARG: HCPCS | Performed by: NEUROLOGICAL SURGERY

## 2025-02-27 PROCEDURE — 85027 COMPLETE CBC AUTOMATED: CPT

## 2025-02-27 PROCEDURE — 2780000003 HC OR 278 NO HCPCS: Performed by: NEUROLOGICAL SURGERY

## 2025-02-27 PROCEDURE — 2720000007 HC OR 272 NO HCPCS: Performed by: NEUROLOGICAL SURGERY

## 2025-02-27 PROCEDURE — 7100000002 HC RECOVERY ROOM TIME - EACH INCREMENTAL 1 MINUTE: Performed by: NEUROLOGICAL SURGERY

## 2025-02-27 PROCEDURE — 70450 CT HEAD/BRAIN W/O DYE: CPT

## 2025-02-27 PROCEDURE — 8E09XBZ COMPUTER ASSISTED PROCEDURE OF HEAD AND NECK REGION: ICD-10-PCS | Performed by: NEUROLOGICAL SURGERY

## 2025-02-27 PROCEDURE — 3700000002 HC GENERAL ANESTHESIA TIME - EACH INCREMENTAL 1 MINUTE: Performed by: NEUROLOGICAL SURGERY

## 2025-02-27 PROCEDURE — 3600000005 HC OR TIME - INITIAL BASE CHARGE - PROCEDURE LEVEL FIVE: Performed by: NEUROLOGICAL SURGERY

## 2025-02-27 PROCEDURE — 3700000001 HC GENERAL ANESTHESIA TIME - INITIAL BASE CHARGE: Performed by: NEUROLOGICAL SURGERY

## 2025-02-27 PROCEDURE — 80069 RENAL FUNCTION PANEL: CPT

## 2025-02-27 PROCEDURE — 2500000005 HC RX 250 GENERAL PHARMACY W/O HCPCS: Performed by: NEUROLOGICAL SURGERY

## 2025-02-27 PROCEDURE — 2500000005 HC RX 250 GENERAL PHARMACY W/O HCPCS: Performed by: ANESTHESIOLOGIST ASSISTANT

## 2025-02-27 PROCEDURE — 2500000004 HC RX 250 GENERAL PHARMACY W/ HCPCS (ALT 636 FOR OP/ED): Mod: JZ | Performed by: ANESTHESIOLOGY

## 2025-02-27 PROCEDURE — 3600000010 HC OR TIME - EACH INCREMENTAL 1 MINUTE - PROCEDURE LEVEL FIVE: Performed by: NEUROLOGICAL SURGERY

## 2025-02-27 PROCEDURE — C1778 LEAD, NEUROSTIMULATOR: HCPCS | Performed by: NEUROLOGICAL SURGERY

## 2025-02-27 PROCEDURE — 7100000001 HC RECOVERY ROOM TIME - INITIAL BASE CHARGE: Performed by: NEUROLOGICAL SURGERY

## 2025-02-27 PROCEDURE — C1713 ANCHOR/SCREW BN/BN,TIS/BN: HCPCS | Performed by: NEUROLOGICAL SURGERY

## 2025-02-27 PROCEDURE — 2500000004 HC RX 250 GENERAL PHARMACY W/ HCPCS (ALT 636 FOR OP/ED): Performed by: NEUROLOGICAL SURGERY

## 2025-02-27 PROCEDURE — 00H03MZ INSERTION OF NEUROSTIMULATOR LEAD INTO BRAIN, PERCUTANEOUS APPROACH: ICD-10-PCS | Performed by: NEUROLOGICAL SURGERY

## 2025-02-27 PROCEDURE — 2500000005 HC RX 250 GENERAL PHARMACY W/O HCPCS: Performed by: ANESTHESIOLOGY

## 2025-02-27 PROCEDURE — A61863 PR IMPLANT NEUROELECTRODE W/O RECORDING: Performed by: ANESTHESIOLOGY

## 2025-02-27 PROCEDURE — 70450 CT HEAD/BRAIN W/O DYE: CPT | Performed by: RADIOLOGY

## 2025-02-27 PROCEDURE — 2500000005 HC RX 250 GENERAL PHARMACY W/O HCPCS

## 2025-02-27 PROCEDURE — 76000 FLUOROSCOPY <1 HR PHYS/QHP: CPT

## 2025-02-27 PROCEDURE — 2500000001 HC RX 250 WO HCPCS SELF ADMINISTERED DRUGS (ALT 637 FOR MEDICARE OP)

## 2025-02-27 PROCEDURE — 1100000001 HC PRIVATE ROOM DAILY

## 2025-02-27 DEVICE — PLATE, 2H 10MM BAR ULTRA LOW PROFILE: Type: IMPLANTABLE DEVICE | Site: BRAIN | Status: FUNCTIONAL

## 2025-02-27 DEVICE — NEUROSTIMULATOR, PERCEPT PC B35200: Type: IMPLANTABLE DEVICE | Site: CHEST  WALL | Status: FUNCTIONAL

## 2025-02-27 DEVICE — CROSS PIN, AXS SELF-TAP, 1.5 X 4MM: Type: IMPLANTABLE DEVICE | Site: BRAIN | Status: FUNCTIONAL

## 2025-02-27 DEVICE — CROSS PIN, AXS SELF-TAP, 1.5 X 5MM: Type: IMPLANTABLE DEVICE | Site: BRAIN | Status: FUNCTIONAL

## 2025-02-27 DEVICE — IMPLANTABLE DEVICE: Type: IMPLANTABLE DEVICE | Site: BRAIN | Status: FUNCTIONAL

## 2025-02-27 RX ORDER — OXYCODONE HYDROCHLORIDE 5 MG/1
2.5 TABLET ORAL EVERY 4 HOURS PRN
Status: DISCONTINUED | OUTPATIENT
Start: 2025-02-27 | End: 2025-02-28 | Stop reason: HOSPADM

## 2025-02-27 RX ORDER — CEFAZOLIN 1 G/1
INJECTION, POWDER, FOR SOLUTION INTRAVENOUS AS NEEDED
Status: DISCONTINUED | OUTPATIENT
Start: 2025-02-27 | End: 2025-02-27

## 2025-02-27 RX ORDER — NALOXONE HYDROCHLORIDE 1 MG/ML
0.2 INJECTION INTRAMUSCULAR; INTRAVENOUS; SUBCUTANEOUS EVERY 5 MIN PRN
Status: DISCONTINUED | OUTPATIENT
Start: 2025-02-27 | End: 2025-02-28 | Stop reason: HOSPADM

## 2025-02-27 RX ORDER — DEXTROSE 50 % IN WATER (D50W) INTRAVENOUS SYRINGE
12.5
Status: DISCONTINUED | OUTPATIENT
Start: 2025-02-27 | End: 2025-02-28 | Stop reason: HOSPADM

## 2025-02-27 RX ORDER — LIDOCAINE HYDROCHLORIDE 20 MG/ML
INJECTION, SOLUTION INFILTRATION; PERINEURAL AS NEEDED
Status: DISCONTINUED | OUTPATIENT
Start: 2025-02-27 | End: 2025-02-27

## 2025-02-27 RX ORDER — POLYETHYLENE GLYCOL 3350 17 G/17G
17 POWDER, FOR SOLUTION ORAL DAILY
Status: DISCONTINUED | OUTPATIENT
Start: 2025-02-27 | End: 2025-02-28 | Stop reason: HOSPADM

## 2025-02-27 RX ORDER — ACETAMINOPHEN 325 MG/1
650 TABLET ORAL EVERY 6 HOURS
Status: DISCONTINUED | OUTPATIENT
Start: 2025-02-27 | End: 2025-02-28 | Stop reason: HOSPADM

## 2025-02-27 RX ORDER — OXYCODONE HYDROCHLORIDE 5 MG/1
10 TABLET ORAL EVERY 4 HOURS PRN
Status: DISCONTINUED | OUTPATIENT
Start: 2025-02-27 | End: 2025-02-28 | Stop reason: HOSPADM

## 2025-02-27 RX ORDER — PROPOFOL 10 MG/ML
INJECTION, EMULSION INTRAVENOUS AS NEEDED
Status: DISCONTINUED | OUTPATIENT
Start: 2025-02-27 | End: 2025-02-27

## 2025-02-27 RX ORDER — CARBIDOPA AND LEVODOPA 25; 250 MG/1; MG/1
1 TABLET ORAL
Status: DISCONTINUED | OUTPATIENT
Start: 2025-02-27 | End: 2025-02-28 | Stop reason: HOSPADM

## 2025-02-27 RX ORDER — ACETAMINOPHEN 325 MG/1
650 TABLET ORAL EVERY 4 HOURS PRN
Status: DISCONTINUED | OUTPATIENT
Start: 2025-02-27 | End: 2025-02-27 | Stop reason: HOSPADM

## 2025-02-27 RX ORDER — ENTACAPONE 200 MG/1
200 TABLET ORAL 4 TIMES DAILY
Status: DISCONTINUED | OUTPATIENT
Start: 2025-02-27 | End: 2025-02-28 | Stop reason: HOSPADM

## 2025-02-27 RX ORDER — CEFAZOLIN SODIUM 2 G/100ML
2 INJECTION, SOLUTION INTRAVENOUS EVERY 8 HOURS
Status: DISCONTINUED | OUTPATIENT
Start: 2025-02-27 | End: 2025-02-28 | Stop reason: HOSPADM

## 2025-02-27 RX ORDER — GLYCOPYRROLATE 0.2 MG/ML
INJECTION INTRAMUSCULAR; INTRAVENOUS AS NEEDED
Status: DISCONTINUED | OUTPATIENT
Start: 2025-02-27 | End: 2025-02-27

## 2025-02-27 RX ORDER — OXYCODONE HYDROCHLORIDE 5 MG/1
5 TABLET ORAL EVERY 4 HOURS PRN
Status: DISCONTINUED | OUTPATIENT
Start: 2025-02-27 | End: 2025-02-27 | Stop reason: HOSPADM

## 2025-02-27 RX ORDER — LIDOCAINE HYDROCHLORIDE 10 MG/ML
0.1 INJECTION, SOLUTION EPIDURAL; INFILTRATION; INTRACAUDAL; PERINEURAL ONCE
Status: DISCONTINUED | OUTPATIENT
Start: 2025-02-27 | End: 2025-02-27 | Stop reason: HOSPADM

## 2025-02-27 RX ORDER — CEFAZOLIN SODIUM 2 G/100ML
2 INJECTION, SOLUTION INTRAVENOUS ONCE
Status: DISCONTINUED | OUTPATIENT
Start: 2025-02-27 | End: 2025-02-27 | Stop reason: HOSPADM

## 2025-02-27 RX ORDER — SODIUM CHLORIDE, SODIUM LACTATE, POTASSIUM CHLORIDE, CALCIUM CHLORIDE 600; 310; 30; 20 MG/100ML; MG/100ML; MG/100ML; MG/100ML
INJECTION, SOLUTION INTRAVENOUS CONTINUOUS PRN
Status: DISCONTINUED | OUTPATIENT
Start: 2025-02-27 | End: 2025-02-27

## 2025-02-27 RX ORDER — MIDAZOLAM HYDROCHLORIDE 1 MG/ML
INJECTION INTRAMUSCULAR; INTRAVENOUS AS NEEDED
Status: DISCONTINUED | OUTPATIENT
Start: 2025-02-27 | End: 2025-02-27

## 2025-02-27 RX ORDER — ROCURONIUM BROMIDE 10 MG/ML
INJECTION, SOLUTION INTRAVENOUS AS NEEDED
Status: DISCONTINUED | OUTPATIENT
Start: 2025-02-27 | End: 2025-02-27

## 2025-02-27 RX ORDER — SODIUM CHLORIDE, SODIUM LACTATE, POTASSIUM CHLORIDE, CALCIUM CHLORIDE 600; 310; 30; 20 MG/100ML; MG/100ML; MG/100ML; MG/100ML
100 INJECTION, SOLUTION INTRAVENOUS CONTINUOUS
Status: ACTIVE | OUTPATIENT
Start: 2025-02-27 | End: 2025-02-27

## 2025-02-27 RX ORDER — FENTANYL CITRATE 50 UG/ML
INJECTION, SOLUTION INTRAMUSCULAR; INTRAVENOUS AS NEEDED
Status: DISCONTINUED | OUTPATIENT
Start: 2025-02-27 | End: 2025-02-27

## 2025-02-27 RX ORDER — PHENYLEPHRINE HCL IN 0.9% NACL 1 MG/10 ML
SYRINGE (ML) INTRAVENOUS AS NEEDED
Status: DISCONTINUED | OUTPATIENT
Start: 2025-02-27 | End: 2025-02-27

## 2025-02-27 RX ORDER — ATORVASTATIN CALCIUM 20 MG/1
20 TABLET, FILM COATED ORAL NIGHTLY
Status: DISCONTINUED | OUTPATIENT
Start: 2025-02-27 | End: 2025-02-28 | Stop reason: HOSPADM

## 2025-02-27 RX ORDER — AMOXICILLIN 250 MG
2 CAPSULE ORAL 2 TIMES DAILY
Status: DISCONTINUED | OUTPATIENT
Start: 2025-02-27 | End: 2025-02-28 | Stop reason: HOSPADM

## 2025-02-27 RX ORDER — NORETHINDRONE AND ETHINYL ESTRADIOL 0.5-0.035
KIT ORAL AS NEEDED
Status: DISCONTINUED | OUTPATIENT
Start: 2025-02-27 | End: 2025-02-27

## 2025-02-27 RX ORDER — ONDANSETRON HYDROCHLORIDE 2 MG/ML
INJECTION, SOLUTION INTRAVENOUS AS NEEDED
Status: DISCONTINUED | OUTPATIENT
Start: 2025-02-27 | End: 2025-02-27

## 2025-02-27 RX ORDER — OXYCODONE HYDROCHLORIDE 5 MG/1
5 TABLET ORAL EVERY 4 HOURS PRN
Status: DISCONTINUED | OUTPATIENT
Start: 2025-02-27 | End: 2025-02-28 | Stop reason: HOSPADM

## 2025-02-27 RX ORDER — METOCLOPRAMIDE HYDROCHLORIDE 5 MG/ML
10 INJECTION INTRAMUSCULAR; INTRAVENOUS ONCE AS NEEDED
Status: DISCONTINUED | OUTPATIENT
Start: 2025-02-27 | End: 2025-02-27 | Stop reason: HOSPADM

## 2025-02-27 RX ORDER — PHENYLEPHRINE 10 MG/250 ML(40 MCG/ML)IN 0.9 % SOD.CHLORIDE INTRAVENOUS
CONTINUOUS PRN
Status: DISCONTINUED | OUTPATIENT
Start: 2025-02-27 | End: 2025-02-27

## 2025-02-27 RX ADMIN — Medication 40 MCG: at 12:17

## 2025-02-27 RX ADMIN — Medication 80 MCG: at 12:30

## 2025-02-27 RX ADMIN — Medication 80 MCG: at 10:16

## 2025-02-27 RX ADMIN — Medication 100 MCG: at 08:26

## 2025-02-27 RX ADMIN — Medication 40 MCG: at 10:07

## 2025-02-27 RX ADMIN — LIDOCAINE HYDROCHLORIDE 100 MG: 20 INJECTION, SOLUTION INFILTRATION; PERINEURAL at 07:49

## 2025-02-27 RX ADMIN — ENTACAPONE 200 MG: 200 TABLET, FILM COATED ORAL at 16:55

## 2025-02-27 RX ADMIN — MIDAZOLAM HYDROCHLORIDE 1 MG: 1 INJECTION, SOLUTION INTRAMUSCULAR; INTRAVENOUS at 10:35

## 2025-02-27 RX ADMIN — ROCURONIUM BROMIDE 10 MG: 10 INJECTION, SOLUTION INTRAVENOUS at 09:50

## 2025-02-27 RX ADMIN — Medication 100 MCG: at 08:11

## 2025-02-27 RX ADMIN — MIDAZOLAM HYDROCHLORIDE 1 MG: 1 INJECTION, SOLUTION INTRAMUSCULAR; INTRAVENOUS at 07:36

## 2025-02-27 RX ADMIN — Medication 200 MCG: at 09:07

## 2025-02-27 RX ADMIN — EPHEDRINE SULFATE 5 MG: 50 INJECTION, SOLUTION INTRAVENOUS at 08:44

## 2025-02-27 RX ADMIN — CARBIDOPA AND LEVODOPA 1 TABLET: 25; 250 TABLET ORAL at 18:50

## 2025-02-27 RX ADMIN — PHENYLEPHRINE-NACL IV SOLUTION 10 MG/250ML-0.9% 0.01 MCG/KG/MIN: 10-0.9/25 SOLUTION at 13:00

## 2025-02-27 RX ADMIN — Medication 40 MCG: at 12:07

## 2025-02-27 RX ADMIN — Medication 40 MCG: at 12:00

## 2025-02-27 RX ADMIN — Medication 200 MCG: at 08:15

## 2025-02-27 RX ADMIN — SODIUM CHLORIDE, POTASSIUM CHLORIDE, SODIUM LACTATE AND CALCIUM CHLORIDE: 600; 310; 30; 20 INJECTION, SOLUTION INTRAVENOUS at 10:09

## 2025-02-27 RX ADMIN — Medication 100 MCG: at 08:39

## 2025-02-27 RX ADMIN — SUGAMMADEX 200 MG: 100 INJECTION, SOLUTION INTRAVENOUS at 13:44

## 2025-02-27 RX ADMIN — PROPOFOL 30 MG: 10 INJECTION, EMULSION INTRAVENOUS at 10:48

## 2025-02-27 RX ADMIN — Medication 80 MCG: at 10:53

## 2025-02-27 RX ADMIN — CARBIDOPA AND LEVODOPA 1 TABLET: 25; 250 TABLET ORAL at 21:47

## 2025-02-27 RX ADMIN — Medication 40 MCG: at 09:50

## 2025-02-27 RX ADMIN — CARBIDOPA AND LEVODOPA 1 TABLET: 25; 250 TABLET ORAL at 15:57

## 2025-02-27 RX ADMIN — HYDROMORPHONE HYDROCHLORIDE 0.5 MG: 1 INJECTION, SOLUTION INTRAMUSCULAR; INTRAVENOUS; SUBCUTANEOUS at 15:14

## 2025-02-27 RX ADMIN — ENTACAPONE 200 MG: 200 TABLET, FILM COATED ORAL at 20:44

## 2025-02-27 RX ADMIN — Medication 80 MCG: at 12:47

## 2025-02-27 RX ADMIN — ROCURONIUM BROMIDE 30 MG: 10 INJECTION, SOLUTION INTRAVENOUS at 08:49

## 2025-02-27 RX ADMIN — SENNOSIDES AND DOCUSATE SODIUM 2 TABLET: 50; 8.6 TABLET ORAL at 20:43

## 2025-02-27 RX ADMIN — ROCURONIUM BROMIDE 10 MG: 10 INJECTION, SOLUTION INTRAVENOUS at 12:44

## 2025-02-27 RX ADMIN — PROPOFOL 20 MG: 10 INJECTION, EMULSION INTRAVENOUS at 13:02

## 2025-02-27 RX ADMIN — Medication 40 MCG: at 09:39

## 2025-02-27 RX ADMIN — Medication 80 MCG: at 11:16

## 2025-02-27 RX ADMIN — Medication 100 MCG: at 08:42

## 2025-02-27 RX ADMIN — Medication 200 MCG: at 08:20

## 2025-02-27 RX ADMIN — ONDANSETRON 4 MG: 2 INJECTION, SOLUTION INTRAMUSCULAR; INTRAVENOUS at 13:37

## 2025-02-27 RX ADMIN — Medication 80 MCG: at 09:57

## 2025-02-27 RX ADMIN — Medication 40 MCG: at 10:05

## 2025-02-27 RX ADMIN — Medication 10 L/MIN: at 22:36

## 2025-02-27 RX ADMIN — ACETAMINOPHEN 650 MG: 325 TABLET, FILM COATED ORAL at 15:57

## 2025-02-27 RX ADMIN — Medication 40 MCG: at 09:23

## 2025-02-27 RX ADMIN — Medication 80 MCG: at 10:25

## 2025-02-27 RX ADMIN — Medication 200 MCG: at 08:34

## 2025-02-27 RX ADMIN — PROPOFOL 20 MG: 10 INJECTION, EMULSION INTRAVENOUS at 10:49

## 2025-02-27 RX ADMIN — ACETAMINOPHEN 650 MG: 325 TABLET, FILM COATED ORAL at 20:43

## 2025-02-27 RX ADMIN — Medication 40 MCG: at 13:17

## 2025-02-27 RX ADMIN — Medication 40 MCG: at 09:30

## 2025-02-27 RX ADMIN — ATORVASTATIN CALCIUM 20 MG: 20 TABLET, FILM COATED ORAL at 20:43

## 2025-02-27 RX ADMIN — Medication 100 MCG: at 08:33

## 2025-02-27 RX ADMIN — ROCURONIUM BROMIDE 10 MG: 10 INJECTION, SOLUTION INTRAVENOUS at 09:33

## 2025-02-27 RX ADMIN — ROCURONIUM BROMIDE 10 MG: 10 INJECTION, SOLUTION INTRAVENOUS at 13:03

## 2025-02-27 RX ADMIN — ROCURONIUM BROMIDE 10 MG: 10 INJECTION, SOLUTION INTRAVENOUS at 10:48

## 2025-02-27 RX ADMIN — Medication 40 MCG: at 09:15

## 2025-02-27 RX ADMIN — PROPOFOL 200 MG: 10 INJECTION, EMULSION INTRAVENOUS at 07:49

## 2025-02-27 RX ADMIN — Medication 100 MCG: at 08:08

## 2025-02-27 RX ADMIN — GLYCOPYRROLATE 0.2 MG: 0.2 INJECTION INTRAMUSCULAR; INTRAVENOUS at 08:59

## 2025-02-27 RX ADMIN — Medication 40 MCG: at 13:14

## 2025-02-27 RX ADMIN — CEFAZOLIN SODIUM 2 G: 2 INJECTION, SOLUTION INTRAVENOUS at 20:44

## 2025-02-27 RX ADMIN — EPHEDRINE SULFATE 2.5 MG: 50 INJECTION, SOLUTION INTRAVENOUS at 09:07

## 2025-02-27 RX ADMIN — ROCURONIUM BROMIDE 20 MG: 10 INJECTION, SOLUTION INTRAVENOUS at 12:00

## 2025-02-27 RX ADMIN — CEFAZOLIN 2 G: 1 INJECTION, POWDER, FOR SOLUTION INTRAMUSCULAR; INTRAVENOUS at 12:47

## 2025-02-27 RX ADMIN — ROCURONIUM BROMIDE 70 MG: 10 INJECTION, SOLUTION INTRAVENOUS at 07:49

## 2025-02-27 RX ADMIN — ROCURONIUM BROMIDE 20 MG: 10 INJECTION, SOLUTION INTRAVENOUS at 11:22

## 2025-02-27 RX ADMIN — SODIUM CHLORIDE, POTASSIUM CHLORIDE, SODIUM LACTATE AND CALCIUM CHLORIDE: 600; 310; 30; 20 INJECTION, SOLUTION INTRAVENOUS at 07:38

## 2025-02-27 RX ADMIN — Medication 80 MCG: at 11:29

## 2025-02-27 RX ADMIN — Medication 10 L/MIN: at 16:25

## 2025-02-27 RX ADMIN — Medication 10 L/MIN: at 18:20

## 2025-02-27 RX ADMIN — ROCURONIUM BROMIDE 10 MG: 10 INJECTION, SOLUTION INTRAVENOUS at 10:46

## 2025-02-27 RX ADMIN — CEFAZOLIN 2 G: 1 INJECTION, POWDER, FOR SOLUTION INTRAMUSCULAR; INTRAVENOUS at 09:01

## 2025-02-27 RX ADMIN — EPHEDRINE SULFATE 5 MG: 50 INJECTION, SOLUTION INTRAVENOUS at 08:57

## 2025-02-27 RX ADMIN — Medication 40 MCG: at 09:27

## 2025-02-27 RX ADMIN — FENTANYL CITRATE 100 MCG: 50 INJECTION, SOLUTION INTRAMUSCULAR; INTRAVENOUS at 07:49

## 2025-02-27 RX ADMIN — Medication 200 MCG: at 08:00

## 2025-02-27 RX ADMIN — DEXAMETHASONE SODIUM PHOSPHATE 8 MG: 4 INJECTION, SOLUTION INTRAMUSCULAR; INTRAVENOUS at 12:45

## 2025-02-27 RX ADMIN — Medication 40 MCG: at 09:43

## 2025-02-27 RX ADMIN — Medication 80 MCG: at 11:44

## 2025-02-27 SDOH — ECONOMIC STABILITY: HOUSING INSECURITY: IN THE LAST 12 MONTHS, WAS THERE A TIME WHEN YOU WERE NOT ABLE TO PAY THE MORTGAGE OR RENT ON TIME?: NO

## 2025-02-27 SDOH — ECONOMIC STABILITY: HOUSING INSECURITY: AT ANY TIME IN THE PAST 12 MONTHS, WERE YOU HOMELESS OR LIVING IN A SHELTER (INCLUDING NOW)?: NO

## 2025-02-27 SDOH — HEALTH STABILITY: MENTAL HEALTH: CURRENT SMOKER: 0

## 2025-02-27 SDOH — SOCIAL STABILITY: SOCIAL INSECURITY: DO YOU FEEL ANYONE HAS EXPLOITED OR TAKEN ADVANTAGE OF YOU FINANCIALLY OR OF YOUR PERSONAL PROPERTY?: UNABLE TO ASSESS

## 2025-02-27 SDOH — SOCIAL STABILITY: SOCIAL INSECURITY: HAS ANYONE EVER THREATENED TO HURT YOUR FAMILY OR YOUR PETS?: UNABLE TO ASSESS

## 2025-02-27 SDOH — ECONOMIC STABILITY: HOUSING INSECURITY: IN THE PAST 12 MONTHS, HOW MANY TIMES HAVE YOU MOVED WHERE YOU WERE LIVING?: 0

## 2025-02-27 SDOH — SOCIAL STABILITY: SOCIAL INSECURITY
WITHIN THE LAST YEAR, HAVE YOU BEEN HUMILIATED OR EMOTIONALLY ABUSED IN OTHER WAYS BY YOUR PARTNER OR EX-PARTNER?: PATIENT UNABLE TO ANSWER

## 2025-02-27 SDOH — ECONOMIC STABILITY: FOOD INSECURITY: WITHIN THE PAST 12 MONTHS, THE FOOD YOU BOUGHT JUST DIDN'T LAST AND YOU DIDN'T HAVE MONEY TO GET MORE.: NEVER TRUE

## 2025-02-27 SDOH — SOCIAL STABILITY: SOCIAL INSECURITY: DOES ANYONE TRY TO KEEP YOU FROM HAVING/CONTACTING OTHER FRIENDS OR DOING THINGS OUTSIDE YOUR HOME?: UNABLE TO ASSESS

## 2025-02-27 SDOH — ECONOMIC STABILITY: FOOD INSECURITY: WITHIN THE PAST 12 MONTHS, YOU WORRIED THAT YOUR FOOD WOULD RUN OUT BEFORE YOU GOT THE MONEY TO BUY MORE.: NEVER TRUE

## 2025-02-27 SDOH — SOCIAL STABILITY: SOCIAL INSECURITY: ABUSE: ADULT

## 2025-02-27 SDOH — ECONOMIC STABILITY: FOOD INSECURITY: HOW HARD IS IT FOR YOU TO PAY FOR THE VERY BASICS LIKE FOOD, HOUSING, MEDICAL CARE, AND HEATING?: NOT HARD AT ALL

## 2025-02-27 SDOH — SOCIAL STABILITY: SOCIAL INSECURITY: WERE YOU ABLE TO COMPLETE ALL THE BEHAVIORAL HEALTH SCREENINGS?: NO

## 2025-02-27 SDOH — SOCIAL STABILITY: SOCIAL INSECURITY: ARE THERE ANY APPARENT SIGNS OF INJURIES/BEHAVIORS THAT COULD BE RELATED TO ABUSE/NEGLECT?: NO

## 2025-02-27 SDOH — SOCIAL STABILITY: SOCIAL INSECURITY
WITHIN THE LAST YEAR, HAVE YOU BEEN RAPED OR FORCED TO HAVE ANY KIND OF SEXUAL ACTIVITY BY YOUR PARTNER OR EX-PARTNER?: PATIENT UNABLE TO ANSWER

## 2025-02-27 SDOH — SOCIAL STABILITY: SOCIAL INSECURITY: DO YOU FEEL UNSAFE GOING BACK TO THE PLACE WHERE YOU ARE LIVING?: UNABLE TO ASSESS

## 2025-02-27 SDOH — ECONOMIC STABILITY: INCOME INSECURITY: IN THE PAST 12 MONTHS HAS THE ELECTRIC, GAS, OIL, OR WATER COMPANY THREATENED TO SHUT OFF SERVICES IN YOUR HOME?: NO

## 2025-02-27 SDOH — ECONOMIC STABILITY: TRANSPORTATION INSECURITY: IN THE PAST 12 MONTHS, HAS LACK OF TRANSPORTATION KEPT YOU FROM MEDICAL APPOINTMENTS OR FROM GETTING MEDICATIONS?: NO

## 2025-02-27 SDOH — SOCIAL STABILITY: SOCIAL INSECURITY
WITHIN THE LAST YEAR, HAVE YOU BEEN KICKED, HIT, SLAPPED, OR OTHERWISE PHYSICALLY HURT BY YOUR PARTNER OR EX-PARTNER?: PATIENT UNABLE TO ANSWER

## 2025-02-27 SDOH — SOCIAL STABILITY: SOCIAL INSECURITY: HAVE YOU HAD THOUGHTS OF HARMING ANYONE ELSE?: YES

## 2025-02-27 SDOH — SOCIAL STABILITY: SOCIAL INSECURITY: ARE YOU OR HAVE YOU BEEN THREATENED OR ABUSED PHYSICALLY, EMOTIONALLY, OR SEXUALLY BY ANYONE?: UNABLE TO ASSESS

## 2025-02-27 SDOH — SOCIAL STABILITY: SOCIAL INSECURITY: WITHIN THE LAST YEAR, HAVE YOU BEEN AFRAID OF YOUR PARTNER OR EX-PARTNER?: PATIENT UNABLE TO ANSWER

## 2025-02-27 ASSESSMENT — COGNITIVE AND FUNCTIONAL STATUS - GENERAL
DAILY ACTIVITIY SCORE: 24
DAILY ACTIVITIY SCORE: 24
PATIENT BASELINE BEDBOUND: NO
DAILY ACTIVITIY SCORE: 24
MOBILITY SCORE: 24

## 2025-02-27 ASSESSMENT — PAIN SCALES - GENERAL
PAINLEVEL_OUTOF10: 3
PAINLEVEL_OUTOF10: 0 - NO PAIN
PAINLEVEL_OUTOF10: 3
PAINLEVEL_OUTOF10: 3
PAINLEVEL_OUTOF10: 0 - NO PAIN
PAINLEVEL_OUTOF10: 0 - NO PAIN
PAINLEVEL_OUTOF10: 4
PAINLEVEL_OUTOF10: 3
PAINLEVEL_OUTOF10: 4
PAINLEVEL_OUTOF10: 0 - NO PAIN
PAINLEVEL_OUTOF10: 3
PAINLEVEL_OUTOF10: 3
PAINLEVEL_OUTOF10: 0 - NO PAIN
PAINLEVEL_OUTOF10: 3
PAINLEVEL_OUTOF10: 5 - MODERATE PAIN

## 2025-02-27 ASSESSMENT — ACTIVITIES OF DAILY LIVING (ADL)
LACK_OF_TRANSPORTATION: NO
LACK_OF_TRANSPORTATION: NO
BATHING: INDEPENDENT
JUDGMENT_ADEQUATE_SAFELY_COMPLETE_DAILY_ACTIVITIES: YES
HEARING - LEFT EAR: FUNCTIONAL
FEEDING YOURSELF: INDEPENDENT
HEARING - RIGHT EAR: FUNCTIONAL
PATIENT'S MEMORY ADEQUATE TO SAFELY COMPLETE DAILY ACTIVITIES?: YES
GROOMING: INDEPENDENT
TOILETING: INDEPENDENT
ADEQUATE_TO_COMPLETE_ADL: YES
DRESSING YOURSELF: INDEPENDENT
WALKS IN HOME: INDEPENDENT

## 2025-02-27 ASSESSMENT — PAIN - FUNCTIONAL ASSESSMENT

## 2025-02-27 ASSESSMENT — LIFESTYLE VARIABLES
HOW OFTEN DO YOU HAVE 6 OR MORE DRINKS ON ONE OCCASION: NEVER
AUDIT-C TOTAL SCORE: 1
AUDIT-C TOTAL SCORE: 1
SKIP TO QUESTIONS 9-10: 1
HOW OFTEN DO YOU HAVE A DRINK CONTAINING ALCOHOL: MONTHLY OR LESS
HOW MANY STANDARD DRINKS CONTAINING ALCOHOL DO YOU HAVE ON A TYPICAL DAY: 1 OR 2

## 2025-02-27 ASSESSMENT — COLUMBIA-SUICIDE SEVERITY RATING SCALE - C-SSRS
1. IN THE PAST MONTH, HAVE YOU WISHED YOU WERE DEAD OR WISHED YOU COULD GO TO SLEEP AND NOT WAKE UP?: NO
2. HAVE YOU ACTUALLY HAD ANY THOUGHTS OF KILLING YOURSELF?: NO
6. HAVE YOU EVER DONE ANYTHING, STARTED TO DO ANYTHING, OR PREPARED TO DO ANYTHING TO END YOUR LIFE?: NO

## 2025-02-27 ASSESSMENT — PATIENT HEALTH QUESTIONNAIRE - PHQ9
1. LITTLE INTEREST OR PLEASURE IN DOING THINGS: NOT AT ALL
SUM OF ALL RESPONSES TO PHQ9 QUESTIONS 1 & 2: 0
2. FEELING DOWN, DEPRESSED OR HOPELESS: NOT AT ALL

## 2025-02-27 ASSESSMENT — PAIN DESCRIPTION - DESCRIPTORS
DESCRIPTORS: SORE

## 2025-02-27 NOTE — BRIEF OP NOTE
Date: 2025  OR Location: [facility] OR    Name: Hayden Hester, : 1955, Age: 69 y.o., MRN: [id number], Sex: male    Diagnosis  Pre-op Diagnosis      * Parkinson's disease with dyskinesia and fluctuating manifestations [G20.B2] Post-op Diagnosis     * Parkinson's disease with dyskinesia and fluctuating manifestations [G20.B2]     Procedures  Bilateral Subthalamic Nucleus Deep Brain Stimulator Electrodes and Generator Placement  26368 - DE STRTCTC IMPLTJ NSTIM ELTRD W/O RECORD 1ST ARRAY    Bilateral Subthalamic Nucleus Deep Brain Stimulator Electrodes and Generator Placement  81801 - DE STRTCTC IMPLTJ NSTIM ELTRD W/O RECORD EA ARRAY    Bilateral Subthalamic Nucleus Deep Brain Stimulator Electrodes and Generator Placement  29625 - DE INSJ/RPLCMT CRANIAL NEUROSTIM PULSE GENERATOR      Surgeons      * Aneta Fry - Primary    Resident/Fellow/Other Assistant:  Surgeons and Role:     * Charles Disla MD - Resident - Assisting    Staff:   Circulator: Melba  Scrub Person: Bernadette  Circulator: Viv  Circulator: Viv Chan Scrub: Dayday    Anesthesia Staff: Anesthesiologist: Everette Isbell MD  CRNA: Anai Nam APRN-CRNA  C-AA: ALLISON Riley  PUNEET: Lucy Palladino    Procedure Summary  Anesthesia: General  ASA: II  Estimated Blood Loss: 30 mL  Intra-op Medications:   Administrations occurring from 0730 to 1330 on 25:   Medication Name Total Dose   thrombin-bovine (JMI) 5,000 unit topical solution 10,000 Units   gelatin absorbable (Gelfoam) 100 sponge 1 each   bupivacaine PF 0.25 % (Marcaine) 0.25 % (2.5 mg/mL) 30 mL, lidocaine-epinephrine (Xylocaine W/EPI) 1 %-1:100,000 20 mL syringe 45 mL   ceFAZolin (Ancef) 2,000 mg, polymyxin B 100,000 Units in sodium chloride 0.9 % 2,000 mL irrigation 2,000 mL   ceFAZolin (Ancef) vial 1 g 4 g   dexAMETHasone (Decadron) injection 4 mg/mL 8 mg   ePHEDrine injection 12.5 mg   fentaNYL (Sublimaze) injection 50 mcg/mL 100 mcg   glycopyrrolate (Robinul) injection 0.2  mg   lactated Ringer's infusion Cannot be calculated   lidocaine (Xylocaine) injection 2 % 100 mg   midazolam PF (Versed) injection 1 mg/mL 2 mg   phenylephrine (Ki-Synephrine) 10 mg/250 mL NS (40 mcg/mL) infusion 0.01 mg   phenylephrine 100 mcg/mL syringe 10 mL (prefilled) 2,880 mcg   propofol (Diprivan) injection 10 mg/mL 270 mg   rocuronium (ZeMuron) 50 mg/5 mL injection 200 mg              Anesthesia Record               Intraprocedure I/O Totals          Intake    Phenylephrine Drip 0.00 mL    The total shown is the total volume documented since Anesthesia Start was filed.    lactated Ringer's 1000.00 mL    Total Intake 1000 mL       Output    Urine 270 mL    Total Output 270 mL       Net    Net Volume 730 mL          Specimen: No specimens collected               Findings: bilateral STN DBS placement and R sided generator placement with good impedance at the end pf the case     Complications:  None; patient tolerated the procedure well.     Disposition: PACU - hemodynamically stable.  Condition: stable  Specimens Collected: No specimens collected  Attending Attestation:     Aneta Fry  Phone Number: [phone]

## 2025-02-27 NOTE — ANESTHESIA PROCEDURE NOTES
Airway  Date/Time: 2/27/2025 7:52 AM  Urgency: elective    Airway not difficult    Staffing  Performed: PUNEET   Authorized by: Everette Isbell MD    Performed by: ALLISON Riley  Patient location during procedure: OR    Indications and Patient Condition  Indications for airway management: anesthesia and airway protection  Spontaneous Ventilation: absent  Preoxygenated: yes  Patient position: sniffing  MILS maintained throughout  Mask difficulty assessment: 1 - vent by mask    Final Airway Details  Final airway type: endotracheal airway      Successful airway: ETT  Cuffed: yes   Successful intubation technique: direct laryngoscopy  Endotracheal tube insertion site: oral  Blade: Lucinda  Blade size: #4  ETT size (mm): 7.5  Cormack-Lehane Classification: grade I - full view of glottis  Placement verified by: chest auscultation and capnometry   Measured from: lips  ETT to lips (cm): 23  Number of attempts at approach: 1

## 2025-02-27 NOTE — HOSPITAL COURSE
Hayden Hester is a 69 yr old M with h/o HLD, parkinson's disease since 2015 (RUE tremor, bradykinesia, rigidity, and motor fluctuations).  On 2/27/2025 patient underwent  Bilateral Subthalamic Nucleus Deep Brain Stimulator Electrodes and right chest Generator Placement.      Patient recovered well post operatively without complication.  On day of discharge patient was in satisfactory condition with follow up appointments arranged.

## 2025-02-27 NOTE — ANESTHESIA PREPROCEDURE EVALUATION
Patient: Hayden Hester    Procedure Information       Date/Time: 25    Procedure: Bilateral Subthalamic Nucleus Deep Brain Stimulator Electrodes and Generator Placement (Bilateral: Head)    Location: [facility] OR 06 / [facility] OR    Surgeons: Aneta Fry MD            Relevant Problems   No relevant active problems       Clinical information reviewed:   Tobacco  Allergies  Meds   Med Hx  Surg Hx   Fam Hx  Soc Hx         Past Medical History:   Diagnosis Date   • Hyperlipidemia, unspecified     Diet-controlled hyperlipidemia   • Memory loss or impairment    • Parkinson's disease (Multi)       Past Surgical History:   Procedure Laterality Date   • COLONOSCOPY     • HERNIA REPAIR Right     Inguinal Hernia Repair     Social History     Tobacco Use   • Smoking status: Former     Current packs/day: 0.00     Average packs/day: 0.1 packs/day for 6.0 years (0.6 ttl pk-yrs)     Types: Cigarettes     Start date:      Quit date:      Years since quittin.1   • Smokeless tobacco: Never   Vaping Use   • Vaping status: Never Used   Substance Use Topics   • Alcohol use: Not Currently     Comment: rare   • Drug use: Never      Current Outpatient Medications   Medication Instructions   • atorvastatin (Lipitor) 10 mg tablet Daily   • carbidopa-levodopa (Sinemet CR)  mg ER tablet Take 1 tab at 10pm bedtime x 2 weeks if needed take 2 tabs at bedtime   • carbidopa-levodopa (Sinemet)  mg tablet 1 tablet, oral, 4 times daily   • chlorhexidine (Peridex) 0.12 % solution Swish for 30 seconds and spit 15mL of solution the night before and morning of surgery   • entacapone (COMTAN) 200 mg, oral, 4 times daily, Take with carbidopa/levodopa   • LORazepam (Ativan) 1 mg tablet Take 1 tablet 1 hour prior to your MRI. May repeat 15-30 minutes prior to your MRI.      No Known Allergies     Chemistry    Lab Results   Component Value Date/Time     2025    K 4.2 2025      "02/13/2025 0933    CO2 30 02/13/2025 0933    BUN 34 (H) 02/13/2025 0933    CREATININE 0.77 02/13/2025 0933    Lab Results   Component Value Date/Time    CALCIUM 9.6 02/13/2025 0933          Lab Results   Component Value Date    HGBA1C 5.1 02/13/2025     Lab Results   Component Value Date/Time    WBC 6.5 02/13/2025 0933    HGB 16.3 02/13/2025 0933    HCT 49.4 02/13/2025 0933     02/13/2025 0933     Lab Results   Component Value Date/Time    PROTIME 11.2 02/13/2025 0933    INR 1.0 02/13/2025 0933     No results found for: \"ABORH\"  Encounter Date: 02/13/25   ECG 12 lead (Clinic Performed)   Result Value    Ventricular Rate 68    Atrial Rate 68    OK Interval 160    QRS Duration 80    QT Interval 376    QTC Calculation(Bazett) 399    P Axis 47    R Axis 72    T Axis 53    QRS Count 11    Q Onset 217    P Onset 137    P Offset 185    T Offset 405    QTC Fredericia 392    Narrative    Normal sinus rhythm  Normal ECG  No previous ECGs available  Confirmed by Arnel Trujillo ([id number]) on 2/13/2025 10:46:54 AM     No results found for this or any previous visit from the past 1095 days.       Visit Vitals  Smoking Status Former     NPO/Void Status  Carbohydrate Drink Given Prior to Surgery? : N  Date of Last Liquid: 02/27/25  Time of Last Liquid: 0500  Date of Last Solid: 02/26/25  Time of Last Solid: 2000  Last Intake Type: Clear fluids (water)  Time of Last Void: 0600        Physical Exam    Airway  Mallampati: II  TM distance: >3 FB  Neck ROM: full     Cardiovascular - normal exam     Dental - normal exam     Pulmonary - normal exam     Abdominal - normal exam         Anesthesia Plan    History of general anesthesia?: yes  History of complications of general anesthesia?: no    ASA 2     general     The patient is not a current smoker.    intravenous induction   Postoperative administration of opioids is intended.  Anesthetic plan and risks discussed with patient.  Use of blood products discussed with patient who.    Plan " discussed with attending and CAA.

## 2025-02-27 NOTE — ANESTHESIA PROCEDURE NOTES
Peripheral IV  Date/Time: 2/27/2025 8:06 AM      Placement  Needle size: 18 G  Laterality: left  Location: hand  Local anesthetic: none  Site prep: alcohol  Technique: anatomical landmarks  Attempts: 1

## 2025-02-28 VITALS
RESPIRATION RATE: 17 BRPM | TEMPERATURE: 98.8 F | HEART RATE: 86 BPM | WEIGHT: 138.01 LBS | OXYGEN SATURATION: 97 % | DIASTOLIC BLOOD PRESSURE: 70 MMHG | HEIGHT: 66 IN | SYSTOLIC BLOOD PRESSURE: 132 MMHG | BODY MASS INDEX: 22.18 KG/M2

## 2025-02-28 PROCEDURE — 2500000005 HC RX 250 GENERAL PHARMACY W/O HCPCS

## 2025-02-28 PROCEDURE — 2500000001 HC RX 250 WO HCPCS SELF ADMINISTERED DRUGS (ALT 637 FOR MEDICARE OP)

## 2025-02-28 PROCEDURE — 61863 IMPLANT NEUROELECTRODE: CPT | Performed by: NEUROLOGICAL SURGERY

## 2025-02-28 PROCEDURE — 2500000004 HC RX 250 GENERAL PHARMACY W/ HCPCS (ALT 636 FOR OP/ED)

## 2025-02-28 PROCEDURE — 61886 IMPLANT NEUROSTIM ARRAYS: CPT | Performed by: NEUROLOGICAL SURGERY

## 2025-02-28 RX ORDER — AMOXICILLIN 250 MG
2 CAPSULE ORAL 2 TIMES DAILY
Qty: 56 TABLET | Refills: 0 | Status: SHIPPED | OUTPATIENT
Start: 2025-02-28 | End: 2025-03-14

## 2025-02-28 RX ORDER — ACETAMINOPHEN 325 MG/1
650 TABLET ORAL EVERY 6 HOURS
Qty: 112 TABLET | Refills: 0 | Status: SHIPPED | OUTPATIENT
Start: 2025-02-28 | End: 2025-03-14

## 2025-02-28 RX ORDER — OXYCODONE HYDROCHLORIDE 5 MG/1
5 TABLET ORAL EVERY 4 HOURS PRN
Qty: 15 TABLET | Refills: 0 | Status: SHIPPED | OUTPATIENT
Start: 2025-02-28 | End: 2025-03-05

## 2025-02-28 RX ADMIN — ENTACAPONE 200 MG: 200 TABLET, FILM COATED ORAL at 06:23

## 2025-02-28 RX ADMIN — Medication 10 L/MIN: at 04:56

## 2025-02-28 RX ADMIN — ACETAMINOPHEN 650 MG: 325 TABLET, FILM COATED ORAL at 08:05

## 2025-02-28 RX ADMIN — Medication 10 L/MIN: at 00:17

## 2025-02-28 RX ADMIN — CARBIDOPA AND LEVODOPA 1 TABLET: 25; 250 TABLET ORAL at 09:58

## 2025-02-28 RX ADMIN — SENNOSIDES AND DOCUSATE SODIUM 2 TABLET: 50; 8.6 TABLET ORAL at 08:05

## 2025-02-28 RX ADMIN — CARBIDOPA AND LEVODOPA 1 TABLET: 25; 250 TABLET ORAL at 02:55

## 2025-02-28 RX ADMIN — POLYETHYLENE GLYCOL 3350 17 G: 17 POWDER, FOR SOLUTION ORAL at 08:05

## 2025-02-28 RX ADMIN — Medication 10 L/MIN: at 02:56

## 2025-02-28 RX ADMIN — CEFAZOLIN SODIUM 2 G: 2 INJECTION, SOLUTION INTRAVENOUS at 04:10

## 2025-02-28 RX ADMIN — ACETAMINOPHEN 650 MG: 325 TABLET, FILM COATED ORAL at 02:55

## 2025-02-28 ASSESSMENT — PAIN SCALES - GENERAL: PAINLEVEL_OUTOF10: 0 - NO PAIN

## 2025-02-28 NOTE — PROGRESS NOTES
"Pharmacy Medication History Review    Hayden Hester is a 69 y.o. male admitted for Parkinson's disease with dyskinesia and fluctuating manifestations. Pharmacy reviewed the patient's [id number] medications and allergies for accuracy.    The following updates were made to the Prior to Admission medication list:     Source of Information: patient, his wife, medication bottles, outpatient refill history and [facility] and [facility] records     Medications CHANGED:  Patient states he currently takes 6 of the Sinemet  mg 6 times per day (every 3 hours from when he wakes up ~ 6 am) along with entacapone 200 mg with the first 2 morning and 2 of the afternoon doses. He then takes 2 Sinemet CR  mg ~ 10 pm/ bedtime  Medications REMOVED:   Peridex and PRN ativan as for procedure  Medications NOT TAKING:   Atorvastatin - apparently he still has this medication but does not take it \"regularly\" - no recent fill so educated on follow up with his provider    Allergy reviewed : Yes    Meds 2 Beds : No- discharging this morning     Comments: Patient educated on medications indication, side effects, and importance of compliance and follow up . No barriers noted at this time. Patient given a chance to ask any questions or address additional concerns. No noted concerns at this time.      The list below reflectives the updated PTA list. Please review each medication in order reconciliation for additional clarification and justification.  Prior to Admission Medications   Prescriptions Last Dose Informant Patient Reported? Taking?   atorvastatin (Lipitor) 10 mg tablet Not Taking  Yes No   Sig: Take by mouth once daily.   Patient not taking: Reported on 2/28/2025   carbidopa-levodopa (Sinemet CR)  mg ER tablet 2/26/2025  No Yes   Sig: Take 1 tab at 10pm bedtime x 2 weeks if needed take 2 tabs at bedtime   Patient taking differently: 2 tablets once daily at bedtime. Take 1 tab at 10pm bedtime x 2 weeks if needed take 2 tabs " at bedtime   carbidopa-levodopa (Sinemet)  mg tablet 2/27/2025 Morning  No Yes   Sig: Take 1 tablet by mouth 4 times a day.   Patient taking differently: Take 1 tablet by mouth 6 times a day.   chlorhexidine (Peridex) 0.12 % solution 2/27/2025 Morning  No Yes   Sig: Swish for 30 seconds and spit 15mL of solution the night before and morning of surgery   entacapone (Comtan) 200 mg tablet 2/26/2025 Noon  No Yes   Sig: Take 1 tablet (200 mg) by mouth 4 times a day. Take with carbidopa/levodopa      Facility-Administered Medications: None     Beena Holley, PharmD

## 2025-02-28 NOTE — DISCHARGE SUMMARY
Discharge Diagnosis  Parkinson's disease with dyskinesia and fluctuating manifestations    Issues Requiring Follow-Up  Routine post operative care    Test Results Pending At Discharge  Pending Labs       No current pending labs.            Hospital Course  Hayden Hester is a 69 yr old M with h/o HLD, parkinson's disease since 2015 (RUE tremor, bradykinesia, rigidity, and motor fluctuations).  On 2/27/2025 patient underwent  Bilateral Subthalamic Nucleus Deep Brain Stimulator Electrodes and right chest Generator Placement.      Patient recovered well post operatively without complication.  On day of discharge patient was in satisfactory condition with follow up appointments arranged.     Pertinent Physical Exam At Time of Discharge  Physical Exam    Home Medications     Medication List      START taking these medications     acetaminophen 325 mg tablet; Commonly known as: Tylenol; Take 2 tablets   (650 mg) by mouth every 6 hours for 14 days.   oxyCODONE 5 mg immediate release tablet; Commonly known as: Roxicodone;   Take 1 tablet (5 mg) by mouth every 4 hours if needed for severe pain (7 -   10) for up to 5 days.   sennosides-docusate sodium 8.6-50 mg tablet; Commonly known as:   Makayla-Colace; Take 2 tablets by mouth 2 times a day for 14 days. Please   take while taking opoid pain medication to avoid constipation     CONTINUE taking these medications     atorvastatin 10 mg tablet; Commonly known as: Lipitor   * carbidopa-levodopa  mg tablet; Commonly known as: Sinemet; Take   1 tablet by mouth 4 times a day.   * carbidopa-levodopa  mg ER tablet; Commonly known as: Sinemet CR;   Take 1 tab at 10pm bedtime x 2 weeks if needed take 2 tabs at bedtime   entacapone 200 mg tablet; Commonly known as: Comtan; Take 1 tablet (200   mg) by mouth 4 times a day. Take with carbidopa/levodopa  * This list has 2 medication(s) that are the same as other medications   prescribed for you. Read the directions carefully, and ask  your doctor or   other care provider to review them with you.     STOP taking these medications     chlorhexidine 0.12 % solution; Commonly known as: Peridex     ASK your doctor about these medications     LORazepam 1 mg tablet; Commonly known as: Ativan; Take 1 tablet 1 hour   prior to your MRI. May repeat 15-30 minutes prior to your MRI.       Outpatient Follow-Up  Future Appointments   Date Time Provider Department [place]   3/11/2025  9:15 AM NEUROSURGERY [facility] NURSE [id number] [facility]   3/26/2025  8:00 AM Young Mckeon, APRN-CNP [id number] [facility]   3/31/2025  2:15 PM Chano Matthews MD [id number] [facility]   4/4/2025  1:45 PM Aneta Fry MD [id number] [facility]       Elidia Adkins MD

## 2025-02-28 NOTE — CARE PLAN
The patient's goals for the shift include      The clinical goals for the shift include Maintain safety    Problem: Skin  Goal: Decreased wound size/increased tissue granulation at next dressing change  Outcome: Progressing  Goal: Participates in plan/prevention/treatment measures  Outcome: Progressing  Goal: Prevent/manage excess moisture  Outcome: Progressing  Goal: Prevent/minimize sheer/friction injuries  Outcome: Progressing  Goal: Promote/optimize nutrition  Outcome: Progressing  Goal: Promote skin healing  Outcome: Progressing

## 2025-03-03 NOTE — ANESTHESIA POSTPROCEDURE EVALUATION
Patient: Hayden Hester    Procedure Summary       Date: 02/27/25 Room / Location: U A OR 06 / Virtual [facility] OR    Anesthesia Start: 0738 Anesthesia Stop: 1417    Procedure: Bilateral Subthalamic Nucleus Deep Brain Stimulator Electrodes and Generator Placement (Bilateral: Head) Diagnosis:       Parkinson's disease with dyskinesia and fluctuating manifestations      (Parkinson's disease with dyskinesia and fluctuating manifestations [G20.B2])    Surgeons: Aneta Fry MD Responsible Provider: Everette Isbell MD    Anesthesia Type: general ASA Status: 2            Anesthesia Type: general    Vitals Value Taken Time   /85 02/27/25 1715   Temp 36.3 °C (97.3 °F) 02/27/25 1715   Pulse 103 02/27/25 1715   Resp 16 02/27/25 1715   SpO2 97 % 02/27/25 1715       Anesthesia Post Evaluation    Patient location during evaluation: PACU  Patient participation: complete - patient participated  Level of consciousness: awake  Pain management: adequate  Airway patency: patent  Cardiovascular status: acceptable  Respiratory status: acceptable  Hydration status: acceptable  Postoperative Nausea and Vomiting: none    No notable events documented.

## 2025-03-06 NOTE — OP NOTE
Bilateral Subthalamic Nucleus Deep Brain Stimulator Electrodes and Generator Placement (B) Operative Note     Date: 2025  OR Location: U A OR    Name: Hayden Hester, : 1955, Age: 69 y.o., MRN: [id number], Sex: male    Diagnosis  Pre-op Diagnosis      * Parkinson's disease with dyskinesia and fluctuating manifestations [G20.B2] Post-op Diagnosis     * Parkinson's disease with dyskinesia and fluctuating manifestations [G20.B2]     Procedures  Bilateral Subthalamic Nucleus Deep Brain Stimulator Electrodes and Generator Placement  42426 - [place] STRTCTC IMPLTJ NSTIM ELTRD W/O RECORD 1ST ARRAY    Bilateral Subthalamic Nucleus Deep Brain Stimulator Electrodes and Generator Placement  65013 - [place] STRTCTC IMPLTJ NSTIM ELTRD W/O RECORD EA ARRAY    Bilateral Subthalamic Nucleus Deep Brain Stimulator Electrodes and Generator Placement  57120 - MT INSJ/RPLCMT CRANIAL NEUROSTIM PULSE GENERATOR      Surgeons      * Aneta Fry - Primary    Resident/Fellow/Other Assistant:  Surgeons and Role:     * Charles Disla MD - Resident - Assisting    Staff:   Circulator: Melba  Scrub Person: Bernadette  Circulator: Viv  Circulator: Viv Chan Scrub: Dayady    Anesthesia Staff: Anesthesiologist: Everette Isbell MD  CRNA: DEBORAH Gleason-CRNA  C-AA: ALLISON Riley  PUNEET: Lucy Palladino    Procedure Summary  Anesthesia: General  ASA: II  Estimated Blood Loss: ***mL  Intra-op Medications:   Administrations occurring from 0730 to 1330 on 25:   Medication Name Total Dose   thrombin-bovine (JMI) 5,000 unit topical solution 10,000 Units   gelatin absorbable (Gelfoam) 100 sponge 1 each   bupivacaine PF 0.25 % (Marcaine) 0.25 % (2.5 mg/mL) 30 mL, lidocaine-epinephrine (Xylocaine W/EPI) 1 %-1:100,000 20 mL syringe 45 mL   ceFAZolin (Ancef) 2,000 mg, polymyxin B 100,000 Units in sodium chloride 0.9 % 2,000 mL irrigation 2,000 mL   ceFAZolin (Ancef) vial 1 g 4 g   dexAMETHasone (Decadron) injection 4 mg/mL 8 mg   ePHEDrine  injection 12.5 mg   fentaNYL (Sublimaze) injection 50 mcg/mL 100 mcg   glycopyrrolate (Robinul) injection 0.2 mg   lactated Ringer's infusion Cannot be calculated   lidocaine (Xylocaine) injection 2 % 100 mg   midazolam PF (Versed) injection 1 mg/mL 2 mg   phenylephrine (Ki-Synephrine) 10 mg/250 mL NS (40 mcg/mL) infusion 0.01 mg   phenylephrine 100 mcg/mL syringe 10 mL (prefilled) 2,880 mcg   propofol (Diprivan) injection 10 mg/mL 270 mg   rocuronium (ZeMuron) 50 mg/5 mL injection 200 mg              Anesthesia Record               Intraprocedure I/O Totals          Intake    Phenylephrine Drip 0.00 mL    The total shown is the total volume documented since Anesthesia Start was filed.    lactated Ringer's 1900.00 mL    Total Intake 1900 mL       Output    Urine 270 mL    Est. Blood Loss 100 mL    Total Output 370 mL       Net    Net Volume 1530 mL          Specimen: No specimens collected              Drains and/or Catheters:   [REMOVED] Urethral Catheter Non-latex 16 Fr. (Removed)   Site Assessment Clean;Skin intact 02/27/25 1644   Collection Container Standard drainage bag 02/27/25 1525   Securement Method Securing device (Describe) 02/27/25 1525   Output (mL) 1000 mL 02/27/25 2315       Tourniquet Times:         Implants:  Implants       Type Name Action Serial No.      Lead Sen Sight Directional Lead Kit Wasted [id number]     Lead SenSight Directional Lead Kit, LEAD 0.5MM 42CM  MARKER DBS Implanted [id number]     Lead SenSight Directional Lead Kit, LEAD 0.5MM 42CM  DBS Implanted [id number]     Screw PLATE, 2H 10MM BAR ULTRA LOW PROFILE - SNA - [id number] Implanted NA     Neuro Interventional Implant NEUROSTIMULATOR, PERCEPT PC T51334 - [id number] - [id number] Implanted [id number]     Other SinSight Extension Kit, 60 CM, DBS Implanted [id number]     Other SenSight Extension Kit, MARKER DBS Implanted [id number]     Screw PLATE, 2H 10MM BAR ULTRA LOW PROFILE - SNA - [id number] Implanted NA     Neuro  "Interventional Implant CROSS PIN, AXS SELF-TAP, 1.5 X 5MM - SNA - [id number] Implanted NA     Neuro Interventional Implant CROSS PIN, AXS SELF-TAP, 1.5 X 4MM - SNA - [id number] Implanted NA     Neuro Interventional Implant CROSS PIN, AXS SELF-TAP, 1.5 X 4MM - SNA - [id number] Wasted NA              Findings: ***    Indications: Hayden Hester is an 69 y.o. male who is having surgery for Parkinson's disease with dyskinesia and fluctuating manifestations [G20.B2]. ***    The patient was seen in the preoperative area. The risks, benefits, complications, treatment options, non-operative alternatives, expected recovery and outcomes were discussed with the patient. The possibilities of reaction to medication, pulmonary aspiration, injury to surrounding structures, bleeding, recurrent infection, the need for additional procedures, failure to diagnose a condition, and creating a complication requiring transfusion or operation were discussed with the patient. The patient concurred with the proposed plan, giving informed consent.  The site of surgery was properly noted/marked if necessary per policy. The patient has been actively warmed in preoperative area. Preoperative antibiotics {OR OPERATIVE ANTIBIOTICS:8566599347} Venous thrombosis prophylaxis {OR OPERATIVE PROPHYLAXIS:3782733201}    Procedure Details: ***  Complications:  {findings; complications:57833::\"None; patient tolerated the procedure well.\"}    Disposition: {Op note disposition:65222}  Condition: {stable/unstable:41678::\"stable\"}                 Additional Details: ***    Attending Attestation: {Op note attestation (Optional):77305}    Aneta Fry  Phone Number: [phone]      "

## 2025-03-06 NOTE — OP NOTE
Bilateral Subthalamic Nucleus Deep Brain Stimulator Electrodes and Generator Placement (B) Operative Note     Date: 2025  OR Location: U A OR    Name: Hayden Hester, : 1955, Age: 69 y.o., MRN: [id number], Sex: male    Diagnosis  Pre-op Diagnosis      * Parkinson's disease with dyskinesia and fluctuating manifestations [G20.B2] Post-op Diagnosis     * Parkinson's disease with dyskinesia and fluctuating manifestations [G20.B2]     Procedures  Bilateral Subthalamic Nucleus Deep Brain Stimulator Electrodes and Generator Placement  28251 - [place] STRTCTC IMPLTJ NSTIM ELTRD W/O RECORD 1ST ARRAY    Bilateral Subthalamic Nucleus Deep Brain Stimulator Electrodes and Generator Placement  57873 - [place] STRTCTC IMPLTJ NSTIM ELTRD W/O RECORD EA ARRAY    Bilateral Subthalamic Nucleus Deep Brain Stimulator Electrodes and Generator Placement  28658 - [place] INSJ/RPLCMT CRANIAL NEUROSTIM PULSE GENERATOR      Surgeons      * Aneta Fry - Primary    Resident/Fellow/Other Assistant:  Surgeons and Role:     * Charles Disla MD - Resident - Assisting    Staff:   Circulator: Melba  Scrub Person: Bernadette  Circulator: Viv  Circulator: Viv Chan Scrub: Dayday    Anesthesia Staff: Anesthesiologist: Everette Isbell MD  CRNA: DEBORAH Gleason-CRNA  C-AA: ALLISON Riley  PUNEET: Lucy Palladino    Procedure Summary  Anesthesia: General  ASA: II  Estimated Blood Loss: 40mL  Intra-op Medications:   Administrations occurring from 0730 to 1330 on 25:   Medication Name Total Dose   thrombin-bovine (JMI) 5,000 unit topical solution 10,000 Units   gelatin absorbable (Gelfoam) 100 sponge 1 each   bupivacaine PF 0.25 % (Marcaine) 0.25 % (2.5 mg/mL) 30 mL, lidocaine-epinephrine (Xylocaine W/EPI) 1 %-1:100,000 20 mL syringe 45 mL   ceFAZolin (Ancef) 2,000 mg, polymyxin B 100,000 Units in sodium chloride 0.9 % 2,000 mL irrigation 2,000 mL   ceFAZolin (Ancef) vial 1 g 4 g   dexAMETHasone (Decadron) injection 4 mg/mL 8 mg   ePHEDrine  injection 12.5 mg   fentaNYL (Sublimaze) injection 50 mcg/mL 100 mcg   glycopyrrolate (Robinul) injection 0.2 mg   lactated Ringer's infusion Cannot be calculated   lidocaine (Xylocaine) injection 2 % 100 mg   midazolam PF (Versed) injection 1 mg/mL 2 mg   phenylephrine (Ki-Synephrine) 10 mg/250 mL NS (40 mcg/mL) infusion 0.01 mg   phenylephrine 100 mcg/mL syringe 10 mL (prefilled) 2,880 mcg   propofol (Diprivan) injection 10 mg/mL 270 mg   rocuronium (ZeMuron) 50 mg/5 mL injection 200 mg              Anesthesia Record               Intraprocedure I/O Totals          Intake    Phenylephrine Drip 0.00 mL    The total shown is the total volume documented since Anesthesia Start was filed.    lactated Ringer's 1900.00 mL    Total Intake 1900 mL       Output    Urine 270 mL    Est. Blood Loss 100 mL    Total Output 370 mL       Net    Net Volume 1530 mL          Specimen: No specimens collected              Drains and/or Catheters:   [REMOVED] Urethral Catheter Non-latex 16 Fr. (Removed)   Site Assessment Clean;Skin intact 02/27/25 1644   Collection Container Standard drainage bag 02/27/25 1525   Securement Method Securing device (Describe) 02/27/25 1525   Output (mL) 1000 mL 02/27/25 2315       Tourniquet Times:         Implants:  Implants       Type Name Action Serial No.      Lead Sen Sight Directional Lead Kit Wasted [id number]     Lead SenSight Directional Lead Kit, LEAD 0.5MM 42CM  MARKER DBS Implanted [id number]     Lead SenSight Directional Lead Kit, LEAD 0.5MM 42CM  DBS Implanted [id number]     Screw PLATE, 2H 10MM BAR ULTRA LOW PROFILE - SNA - [id number] Implanted NA     Neuro Interventional Implant NEUROSTIMULATOR, PERCEPT PC E91714 - [id number] - [id number] Implanted [id number]     Other SinSight Extension Kit, 60 CM, DBS Implanted [id number]     Other SenSight Extension Kit, MARKER DBS Implanted [id number]     Screw PLATE, 2H 10MM BAR ULTRA LOW PROFILE - SNA - [id number] Implanted NA     Neuro  Interventional Implant CROSS PIN, AXS SELF-TAP, 1.5 X 5MM - SNA - [id number] Implanted NA     Neuro Interventional Implant CROSS PIN, AXS SELF-TAP, 1.5 X 4MM - SNA - [id number] Implanted NA     Neuro Interventional Implant CROSS PIN, AXS SELF-TAP, 1.5 X 4MM - SNA - [id number] Wasted NA              Findings:     Indications: Hayden Hester is an 69 y.o. male who is having surgery for Parkinson's disease with dyskinesia and fluctuating manifestations [G20.B2]. The patient is refractory to medical management, found to be a good candidate for bilateral STN-DBS electrode placement.      The patient was seen in the preoperative area. The risks, benefits, complications, treatment options, non-operative alternatives, expected recovery and outcomes were discussed with the patient. The possibilities of reaction to medication, pulmonary aspiration, injury to surrounding structures, bleeding, recurrent infection, the need for additional procedures, failure to diagnose a condition, and creating a complication requiring transfusion or operation were discussed with the patient. The patient concurred with the proposed plan, giving informed consent.  The site of surgery was properly noted/marked if necessary per policy. The patient has been actively warmed in preoperative area. Preoperative antibiotics have been ordered and given within 1 hours of incision. Venous thrombosis prophylaxis have been ordered including bilateral sequential compression devices    Procedure Details: The patient was brought to the operating room and underwent general endotracheal intubation per Anesthesia. He was positioned supine on the operating table with all pressure points padded appropriately.  A Leksell stereotactic head frame was applied with local anesthetic.  A 3D O-arm spin was performed with head frame in place, and the images were merged with the preoperative MRI and CT scan, and the final targeting plan was completed using the stealth navigation  system.  At this point, the patient received preoperative antibiotics.  He was prepped and draped in sterile fashion.  Local anesthetic was administered, and an incision was made in bilateral frontal regions.     Starting on the left side using the predetermined coordinates according to the Leksell frame Arc system, a small twist drill bony opening was made using a 3.2 mm drill bit.  A guide tube was passed down to target using the Leksell stereotactic frame targeting system.  Fluoroscopy was used to confirm that the guide tube was in appropriate position.  The MDT tightly-spaced lead was passed to the desired target using the directional lead, and again, fluoroscopy confirmed good position of the electrode. The guide tube and stylet were removed, and the lead was secured with a small titanium plate. A 3D O-arm spin was then performed to confirm adequate position of the lead. The same steps were repeated on the contralateral side after adjusting the frame arc system to the coordinates for the right side.  At this point, the 3D O-arm spin was performed again to confirm that both leads were in the desired location. The impedances were checked and were confirmed to be adequate.  The distal aspect of the electrodes were capped with the capping system and the torque wrench, and then, they were tunneled to the right posterior auricular region, and again, fluoroscopy confirmed the leads were in good position as were the distal ends. At this point, more local anesthetic was administered, and copious antibiotic irrigation was used. The incisions were closed in sequential layers with a sterile dressing.     The frame arc system was then disconnected and the patient was re-prepped and draped and re-dosed antibiotics for stage II of the procedure with the generator insertion.  Another incision was made in the right posterior auricular region overlying the previously tunneled electrodes, and the electrodes were accessed. Another  incision was made in the right infraclavicular region, and a subcutaneous pocket was dissected.  The 2 incisions were tunneled together, and the extension cables were passed in between, and they were connected to the distal aspects of the previously tunneled DBS electrodes and secured with locking screws.     The distal end of the extension cables was then secured to the MDT non-rechargeable generator and secured with locking screws inserted into the pocket and secured to the fascia with 2-0 silk sutures.  The impedances were all confirmed to be within normal limits. Both wounds were copiously irrigated with IrriSept irrigation, followed by antibiotic irrigation.  More local anesthetic was administered, and the incisions were closed in sequential layers. All counts were correct at the end of the case.    Complications:  None; patient tolerated the procedure well.    Disposition: PACU - hemodynamically stable.  Condition: stable                 Additional Details:     Attending Attestation:     Aneta Fry  Phone Number: [phone]

## 2025-03-11 ENCOUNTER — CLINICAL SUPPORT (OUTPATIENT)
Dept: NEUROSURGERY | Facility: HOSPITAL | Age: 70
End: 2025-03-11
Payer: MEDICARE

## 2025-03-11 DIAGNOSIS — Z48.89 ENCOUNTER FOR POSTOPERATIVE WOUND CHECK: ICD-10-CM

## 2025-03-11 NOTE — PROGRESS NOTES
69 yr old M with h/o HLD, parkinson's disease since 2015 (RUE tremor, bradykinesia, rigidity, and motor fluctuations).  On 2/27/2025 patient underwent  Bilateral Subthalamic Nucleus Deep Brain Stimulator Electrodes and right chest Generator Placement.      Patient presents today for his nurse for wound check. His incisions are well approximated without redness, swelling or drainage. He denies fever, chills, pain or falls. Wound care instructions given. All questions answered. He is scheduled for his initial programming session on 3/26 with RASHAAD Vidal. He was reminded of post op visit with Dr. Fry on 4/4.

## 2025-03-12 PROBLEM — E78.5 HYPERLIPIDEMIA: Status: ACTIVE | Noted: 2025-03-12

## 2025-03-12 RX ORDER — AMOXICILLIN 500 MG/1
CAPSULE ORAL
COMMUNITY
Start: 2024-08-13

## 2025-03-13 DIAGNOSIS — G20.A1 PARKINSON'S DISEASE WITHOUT DYSKINESIA OR FLUCTUATING MANIFESTATIONS: ICD-10-CM

## 2025-03-18 RX ORDER — CARBIDOPA AND LEVODOPA 25; 250 MG/1; MG/1
TABLET ORAL
Qty: 540 TABLET | Refills: 1 | Status: SHIPPED | OUTPATIENT
Start: 2025-03-18 | End: 2025-03-20 | Stop reason: SDUPTHER

## 2025-03-20 DIAGNOSIS — G20.A1 PARKINSON'S DISEASE WITHOUT DYSKINESIA OR FLUCTUATING MANIFESTATIONS: ICD-10-CM

## 2025-03-20 RX ORDER — CARBIDOPA AND LEVODOPA 25; 250 MG/1; MG/1
1 TABLET ORAL
Qty: 540 TABLET | Refills: 1 | Status: SHIPPED | OUTPATIENT
Start: 2025-03-20 | End: 2025-09-16

## 2025-03-25 RX ORDER — CARBIDOPA AND LEVODOPA 25; 100 MG/1; MG/1
TABLET, EXTENDED RELEASE ORAL
Qty: 180 TABLET | Refills: 3 | OUTPATIENT
Start: 2025-03-25

## 2025-03-26 ENCOUNTER — APPOINTMENT (OUTPATIENT)
Dept: NEUROLOGY | Facility: CLINIC | Age: 70
End: 2025-03-26
Payer: MEDICARE

## 2025-03-26 VITALS
HEART RATE: 73 BPM | BODY MASS INDEX: 22.11 KG/M2 | DIASTOLIC BLOOD PRESSURE: 68 MMHG | SYSTOLIC BLOOD PRESSURE: 115 MMHG | RESPIRATION RATE: 18 BRPM | WEIGHT: 137 LBS

## 2025-03-26 DIAGNOSIS — G20.B2 PARKINSON'S DISEASE WITH DYSKINESIA AND FLUCTUATING MANIFESTATIONS: Primary | ICD-10-CM

## 2025-03-26 PROCEDURE — 95984 ALYS BRN NPGT PRGRMG ADDL 15: CPT | Performed by: NURSE PRACTITIONER

## 2025-03-26 PROCEDURE — 95983 ALYS BRN NPGT PRGRMG 15 MIN: CPT | Performed by: NURSE PRACTITIONER

## 2025-03-26 PROCEDURE — 99212 OFFICE O/P EST SF 10 MIN: CPT | Performed by: NURSE PRACTITIONER

## 2025-03-26 ASSESSMENT — UNIFIED PARKINSONS DISEASE RATING SCALE (UPDRS)
FINGER_TAPPING_LEFT: 2
LEVODOPA: YES
AMPLITUDE_RLE: 0
HANDMOVEMENTS_RIGHT: 1
FINGER_TAPPING_RIGHT: 1
POSTURAL_TREMOR_RIGHTHAND: 1
FREEZING_GAIT: 1
KINETIC_TREMOR_RIGHTHAND: 1
PRONATION_SUPINATION_RIGHT: 1
KINETIC_TREMOR_LEFTHAND: 1
POSTURAL_TREMOR_LEFTHAND: 0
TOTAL_SCORE: 43
LEG_AGILITY_RIGHT: 0
SPEECH: 2
RIGIDITY_LUE: 2
PRONATION_SUPINATION_LEFT: 1
TOETAPPING_RIGHT: 2
PARKINSONS_MEDS: YES
SPONTANEITY_OF_MOVEMENT: 3
DYSKINESIAS_PRESENT: NO
TOETAPPING_LEFT: 3
CLINICAL_STATE: OFF
POSTURE: 3
LEG_AGILITY_LEFT: 1
MINUTES_SINCE_LEVODOPA: 60
AMPLITUDE_LLE: 0
RIGIDITY_NECK: 0
RIGIDITY_RLE: 2
AMPLITUDE_RUE: 2
RIGIDITY_RUE: 2
RIGIDITY_LLE: 0
AMPLITUDE_LUE: 0
FACIAL_EXPRESSION: 2
CHAIR_RISING_SCALE: 1
AMPLITUDE_LIP_JAW: 0
CONSTANCY_TREMOR_ATREST: 2
POSTURAL_STABILITY: 3
GAIT: 2

## 2025-03-26 ASSESSMENT — ENCOUNTER SYMPTOMS
LOSS OF SENSATION IN FEET: 0
DEPRESSION: 0
OCCASIONAL FEELINGS OF UNSTEADINESS: 1

## 2025-03-26 NOTE — PATIENT INSTRUCTIONS
#You are going home on group A, the only group with therapy to use.    If side effects just on one side, then OK to lower that side a click (0.1v) every few mins until resolves.    You have the ability to increase the amplitude on right or left side as needed for tremor, stiffness, slowness of movement.    You can increase by 0.1V every few days for one side of your body as needed. Increasing one side at a time allows you to notice if side effect with that increase BEFORE you adjust the other side.   You can also decrease if any side effects at any time (dyskinesia or other).  Decrease as quickly as you need to at any time.     You can TURN OFF the DBS at any time if any concern for side effect/worsening symptoms.      #DBS precautions    YOU CAN ONLY HAVE MRI DONE AT [facility] LIKE [facility]  Apt with movement disorders clinic is done immediately prior to MRI apt to check it and place in MRI mode, then you proceed to MRI with paperwork  YOU CAN NOT HAVE DIATHERMY  DBS needs turned off for EKG and then right back on when done  You could consider obtaining a medical alert bracelet that includes the phone number for DBS    At the airport, ask to get patted down instead of going through the detector  If you are to have surgery or any procedures:  Let our office know, you will need turned off and or placed in surgery mode for this  please have your surgeon contact our office to send instructions for DBS precautions for surgery  This includes colonoscopies and dental procedures   Discuss with your dentist if you are a candidate for antibiotics prior to cleaning your teeth    For assistance with DBS you can also contact: [employer] Patient Support  [phone]    >>Get in the habit of checking DBS battery every month (even if not making changes) by opening DBS asaf, you will get an alert immediately upon opening on the screen once you need a replacement, then contact our office.      #Save  the Date for The 16th Annual [facility] Parkinson’s Boot Camp  Saturday, November 1st 2025 event information will be posted when available.    #Follow up in 1M for 1hr DBS with me--try to time meds so you will be starting to wear off for apt please      Young Mckeon, NP-C  Adult/Gerontological Nurse Practitioner   [facility], Department of Neurology  [facility]  [facility]  [address] [address]  Phone: [phone]  Fax: [phone]

## 2025-03-26 NOTE — PROGRESS NOTES
"Subjective     Hayden Hester is a 69 y.o. year old male who presents with Parkinson's Disease, here for follow up visit.    HPI  Here for initial DBS programming s/p bilateral STN DBS with Medtronic system 2/27/2025 by Dr. Fry.     Comes with wife.    Had a few weeks of lesion effect. He was not fatigued, was more \"up\" and when it wore off he got more fatigued.   Sleeping better and doing better so he adjusted the meds not needing for 4-5 hrs at night but 3-3.5hrs during the day      MOTOR SYMPTOMS      +/-                            Comments  Motor sx overall     Tremor + RUE tremor is most bothersome   Rigidity +    Bradykinesia +    Balance/gait + \"Average\"   FOG + Not as bad as it used to be    Falls -    PT -    Exercise -      Social  Lives with: wife  Help with ADLs:          [facility] Meds  Med Dose Time   Carbidopa-levodopa 25-250mg 1 tab 6-7x/day Q3hrs during the day and 4hrs at night   Entacapone 200mg  1 tab 3 times a day    Carbidopa-levodopa CR 25/100mg   tab at bedtime 10pm   Latency 30mins    Wearing off  4-5 hrs at night but 3-3.5hrs during the day    Side effects     Dyskinesia BLE 2x/day for a min or 2    Hallucinations None    Other None      Prior medications:  Ropinirole - helped slightly in 2018, changed to levodopa in 2019    Prior workup  MR brain w and wo IV contrast    Result Date: 10/24/2024  Surgical planning scan.   1. Disproportionate enlargement of the supratentorial ventricles compared to the adjacent sulci and mild enlargement of the 4th ventricle in a pattern that is suggestive of ventriculomegaly related to likely chronic ventricular enlargement possibly from prior infectious/inflammatory process. Alternatively, ventriculomegaly could also be related to central white matter volume loss. Sequela of normal pressure hydrocephalus is another consideration.   2. Mild signal abnormality located within the supratentorial periventricular white matter may be sequela of " chronic small vessel ischemic changes with subtle transependymal flow not excluded but favored less likely.   3. No abnormal intracranial enhancement or mass.   This study was interpreted at [facility].   MACRO: None   Signed by: Valerio Bass 10/24/2024 8:00 AM Dictation workstation:   [id number]     Neuropsychology testing Dr. Godoy 9/12/24:  He appears to be a good candidate for DBS from the neuropsychological standpoint.   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     LRT 9/11/24:  He took 2 tabs Sinemet and was only 4.5hrs off meds  OFF-44, ON- 30, % change: 32%      Patient Health Questionnaire-2 Score: 0            Current Outpatient Medications:     carbidopa-levodopa (Sinemet CR)  mg ER tablet, Take 1 tab at 10pm bedtime x 2 weeks if needed take 2 tabs at bedtime (Patient taking differently: Take 1 tablet by mouth once daily at bedtime. Take 1 tab at 10pm bedtime x 2 weeks if needed take 2 tabs at bedtime), Disp: 180 tablet, Rfl: 3    atorvastatin (Lipitor) 10 mg tablet, Take by mouth once daily. (Patient not taking: Reported on 2/28/2025), Disp: , Rfl:     carbidopa-levodopa (Sinemet)  mg tablet, Take 1 tablet by mouth 6 times a day., Disp: 540 tablet, Rfl: 1    entacapone (Comtan) 200 mg tablet, Take 1 tablet (200 mg) by mouth 4 times a day. Take with carbidopa/levodopa, Disp: 360 tablet, Rfl: 3       Objective   Vitals:    03/26/25 0808 03/26/25 0809   BP: 119/70 115/68   BP Location: Left arm Left arm   Patient Position: Sitting Standing   BP Cuff Size: Adult Adult   Pulse: 70 73   Resp: 18    Weight: 62.1 kg (137 lb)                    Physical Exam    R chest IPG,  R head incision behind/above the ear  and bilateral head incisions are healing well without open areas or signs of infection.    MDS UPDRS 1st Score: Motor Examination  Is the patient on medication for treating the symptoms of Parkinson's Disease?: Yes  Patients receiving medication for treating the symptoms of Parkinson's Disease, chely the patient's clinical state.: Off  Is the patient on Levodopa?: Yes  Minutes since last Levodopa dose: 60  Speech: 2  Facial Expression: 2  Rigidty Neck: 0  Rigidty RUE: 2  Rigidity - LUE: 2  Rigidity RLE: 2  Rigidity LLE: 0  Finger Tapping Right Hand: 1  Finger Tapping Left Hand: 2  Hand Movements- Right Hand: 1  Hand Movements- Left Hand: 1  Pronatiaon-Supination Movments - Right Hand: 1  Pronatiaon-Supination Movments Left Hand: 1  Toe Tapping Right Foot: 2  Toe Tapping - Left Foot: 3  Leg Agility - Right Le  Leg Agility - Left le  Arising from Chair: 1  Gait: 2  Freezing of Gait: 1  Postural Stability: 3  Posture: 3  Global Spontanteity of Movment ( Body Bradykinesia): 3  Postural Tremor - Right Hand: 1  Postural Tremor - Left hand: 0  Kinetic Tremor - Right hand: 1  Kinetic Tremor - Left hand: 1  Rest Tremor Amplitude - RUE: 2  Rest Tremor Amplitude - LUE: 0  Rest Tremor Amplitude - RLE: 0  Rest Tremor Amplitude - LLE: 0  Rest Tremor Amplitude - Lip/Jaw: 0  Constancy of Rest Tremor: 2  MDS UPDRS Total Score: 43  Were dyskinesias (chorea or dystonia) present during examination?: No        DBS Procedure:  [place]  IPG: off  Impedance: all OK    Initial Settings:  OFF    Programming  [employer]   L STN 1&2 best signals and A segment  R STN shows gamma (dyskinesia( and signals not as good, possibly 8,10b&C seg slightly higher    Initial monopolar review:  L STN ---when RUE tremor resolves bradykinesia also improved, BLE leg tremors start to come out as meds wear off  0- 3.0 (no tremor but is intermittent) and SE R face tightness--worse at 3.5  1- 1.5 tremor improved (mild action), 2.0 rigidity  "improved (1+), 2.5 tremor and rigidity resolve, SE 4.0 \"weird\"   2-2.0, rigidity resolved, 2.5 action tremor resolves, rest tremor appeared to resolve---3.5 resting tremor breaks through and is constant (appeared to be good before this), SE 5.0 feels weird   3- 1.0 tremor resolves??, 5.0 action tremor resolves, rest tremor did not break through    1a- 2.5 tremor improved (1+intermittent, action still 1+),   1b- 4.0 rigidity resolves but tremor still often, no SE at 5.0  1c- 3.0 SE pain in the back of head     R STN--bradykinesia and rigidity   8- 1.5 rigidity resolved, SE 3.0 facial pulling  9- 3.0 seems worse but not much improvement before, SE 3.0 weird in LUE  10- no SE or improvement at 5.0  11- no SE or improvement at 5.0    Tried the following:  C+ 1- 2.0/60/130 ((0-2.5))  C+ 8- 0.8/60/130---feels weird and better when off so changed contact to 9- at 1.0 and still feels weird, out of control, like he needs medicine (but took 30min ago) and turned DBS and walked and still felt this way    Future programming:  Segments for 2, 9 and 10  8 and 9      Final Settings  L STN  C+ 1- 2.0/60/130 ((0-2.5))  R STN  C+ 9- 0.8/60/130 ((0-1.4))        Assessment/Plan   Mr. Hayden Hester is a 69 y.o. M with Parkinson's disease here for initial DBS programming s/p bilateral STN DBS with Medtronic system 2/27/2025 by Dr. Fry. Motor fluctuation with dyskinesia and uncontrolled RUE tremor are his main issues, FOG improved since surgery (had a few weeks of good lesion effect). Good benefit on exam today, good thresholds, DBS turned on today.    Will continue meds as are with patient trying to space them out by 30min as a time as he feels is tolerable, slowly decreasing meds and increasing stimulation.     NPH w/u with second opinion at [facility], not symptomatic      Diagnoses and all orders for this visit:  Parkinson's disease with dyskinesia and fluctuating manifestations      #You are going home on group A, the only group with " therapy to use.    If side effects just on one side, then OK to lower that side a click (0.1v) every few mins until resolves.    You have the ability to increase the amplitude on right or left side as needed for tremor, stiffness, slowness of movement.    You can increase by 0.1V every few days for one side of your body as needed. Increasing one side at a time allows you to notice if side effect with that increase BEFORE you adjust the other side.   You can also decrease if any side effects at any time (dyskinesia or other).  Decrease as quickly as you need to at any time.     You can TURN OFF the DBS at any time if any concern for side effect/worsening symptoms.      #DBS precautions    YOU CAN ONLY HAVE MRI DONE AT [facility] LIKE [facility]  Apt with movement disorders clinic is done immediately prior to MRI apt to check it and place in MRI mode, then you proceed to MRI with paperwork  YOU CAN NOT HAVE DIATHERMY  DBS needs turned off for EKG and then right back on when done  You could consider obtaining a medical alert bracelet that includes the phone number for DBS    At the airport, ask to get patted down instead of going through the detector  If you are to have surgery or any procedures:  Let our office know, you will need turned off and or placed in surgery mode for this  please have your surgeon contact our office to send instructions for DBS precautions for surgery  This includes colonoscopies and dental procedures   Discuss with your dentist if you are a candidate for antibiotics prior to cleaning your teeth    For assistance with DBS you can also contact: [facility] Patient Support  [phone]    >>Get in the habit of checking DBS battery every month (even if not making changes) by opening DBS asaf, you will get an alert immediately upon opening on the screen once you need a replacement, then contact our office.      #Save the Date for The 16th Annual [facility]  Parkinson’s Boot Camp  Saturday, November 1st 2025 event information will be posted when available.    #Follow up in 1M for 1hr DBS with me--try to time meds so you will be starting to wear off for apt please      Total time of 90 minutes for today's visit, of which 75 mins were spent with DBS programming as noted above-checking settings, stimulator events, energy level, and impedances and updating settings in the chart.  I, MADDISON Mckeon, personally performed  a medically appropriate exam, discussion of care/treatment options for remaining time.    Young Mckeon, NP-C  Adult/Gerontological Nurse Practitioner   [facility], Department of Neurology  [facility]  [facility]  [id number] [place] [address]  Phone: [phone]  Fax: [phone]

## 2025-03-27 ENCOUNTER — TELEPHONE (OUTPATIENT)
Dept: NEUROLOGY | Facility: CLINIC | Age: 70
End: 2025-03-27
Payer: MEDICARE

## 2025-03-27 NOTE — TELEPHONE ENCOUNTER
"Li called. She wanted to give an update. His R arm swings back & behind him.  Took meds in office at 1030. Went home slept til 3 (dyskinsia woke him up) he took meds at 4p, dyskinsia continued. She& son lowered the R side to 1.9 ( L is still at 1.0)  As of now... he \"feels good\"  Call Li at [phone] (home)     "

## 2025-03-27 NOTE — TELEPHONE ENCOUNTER
I spoke with patient and wife.     He says he is doing well, now taking Sinemet 4.5-5hrs between doses.     I spoke with wife. She lowered R side of his body for dyskinesia and legs were moving a lot.   Eating with RUE without any tremor.   Says he feels OK but still some movement of the legs.       She did the right thing lowering for dyskinesia.   He can continue to increase amp as tolerated.

## 2025-03-31 ENCOUNTER — APPOINTMENT (OUTPATIENT)
Dept: NEUROLOGY | Facility: CLINIC | Age: 70
End: 2025-03-31
Payer: MEDICARE

## 2025-03-31 ENCOUNTER — TELEPHONE (OUTPATIENT)
Dept: NEUROLOGY | Facility: CLINIC | Age: 70
End: 2025-03-31

## 2025-03-31 DIAGNOSIS — G20.A1 PARKINSON'S DISEASE WITHOUT DYSKINESIA OR FLUCTUATING MANIFESTATIONS: ICD-10-CM

## 2025-03-31 RX ORDER — CARBIDOPA AND LEVODOPA 25; 100 MG/1; MG/1
TABLET, EXTENDED RELEASE ORAL
Qty: 180 TABLET | Refills: 3 | Status: SHIPPED | OUTPATIENT
Start: 2025-03-31

## 2025-03-31 NOTE — TELEPHONE ENCOUNTER
He is doing so well he is only using Sinemet sporadically, he wants to know if he can stop it.     Discussed he should continue it (risk for withdrawal which can be serious), and that he can spread out the time between doses, instead of taking Sinemet 6-7x/day he can go down to 5-6x/day, 4 at the absolute least but would prefer slow lower.

## 2025-04-04 ENCOUNTER — APPOINTMENT (OUTPATIENT)
Dept: NEUROSURGERY | Facility: CLINIC | Age: 70
End: 2025-04-04
Payer: MEDICARE

## 2025-04-04 ENCOUNTER — OFFICE VISIT (OUTPATIENT)
Dept: NEUROSURGERY | Facility: CLINIC | Age: 70
End: 2025-04-04
Payer: MEDICARE

## 2025-04-04 VITALS
HEART RATE: 71 BPM | SYSTOLIC BLOOD PRESSURE: 133 MMHG | RESPIRATION RATE: 18 BRPM | DIASTOLIC BLOOD PRESSURE: 78 MMHG | BODY MASS INDEX: 22.5 KG/M2 | HEIGHT: 66 IN | TEMPERATURE: 97.8 F | WEIGHT: 140 LBS

## 2025-04-04 DIAGNOSIS — G20.B2 PARKINSON'S DISEASE WITH DYSKINESIA AND FLUCTUATING MANIFESTATIONS: Primary | ICD-10-CM

## 2025-04-04 PROCEDURE — 1126F AMNT PAIN NOTED NONE PRSNT: CPT | Performed by: NEUROLOGICAL SURGERY

## 2025-04-04 PROCEDURE — 1036F TOBACCO NON-USER: CPT | Performed by: NEUROLOGICAL SURGERY

## 2025-04-04 PROCEDURE — 99211 OFF/OP EST MAY X REQ PHY/QHP: CPT | Performed by: NEUROLOGICAL SURGERY

## 2025-04-04 PROCEDURE — 3008F BODY MASS INDEX DOCD: CPT | Performed by: NEUROLOGICAL SURGERY

## 2025-04-04 ASSESSMENT — PAIN SCALES - GENERAL: PAINLEVEL_OUTOF10: 0-NO PAIN

## 2025-04-04 NOTE — PROGRESS NOTES
[facility]   Neurosurgery    Diagnosis  Hayden was seen today for post-op.  Diagnoses and all orders for this visit:  Parkinson's disease with dyskinesia and fluctuating manifestations      Patient Discussion/Summary  Today we discussed that you look great since your surgery. Your incisions are healing extremely well and your symptoms are notably better.   We discussed wound care and activity restrictions.   We also discussed expectations with programming.   Follow-up in 1 month, or sooner if changes or concerns develop.     Provider Impressions  69 y.o. right-handed male with PD since 2015, with symptoms of RUE tremor, bradykinesia/dyskinesia, rigidity, and motor fluctuations. Of note, patient had notable ventricular dilation on preop MRI brain; however, upon further discussion at DBS conference and with patient of his findings and symptoms, it was felt that there was no need for NPH workup. Patient is now s/p b/l STN DBS w/ MDT [employer] on 2/27/25. He had his initial programming visit with MADDISON Vidal, on 3/26/25 and has been doing very well, though after his first programming, pt had notably worse dyskinesias. Pt's tremor is much improved; walking is better, overall movement feels much better. Pain is good, fatigue is good.     History of Present Illness  Chief Complaint:   Chief Complaint   Patient presents with    Post-op         HPI: Hayden Hester is a 69 y.o. right-handed male with PD since 2015, with symptoms of RUE tremor, bradykinesia, rigidity, and motor fluctuations. Of note, patient had notable ventricular dilation on preop MRI brain; however, upon further discussion at DBS conference and with patient of his findings and symptoms, it was felt that there was no need for NPH workup. Patient is now s/p b/l STN DBS w/ MDT [employer] on 2/27/25. He had his initial programming visit with MADDISON Vidal, on 3/26/25. Patient presents today for his postop visit.      Pt has been doing very well, though after  "his first programming, pt had notably worse dyskinesias. Pt's tremor is much improved; walking is better, overall movement feels much better. Pain is good, fatigue is good.       ROS: As noted in HPI.      Previous History  Past Medical History:   Diagnosis Date    Hyperlipidemia, unspecified     Diet-controlled hyperlipidemia    Memory loss or impairment     Parkinson's disease      Past Surgical History:   Procedure Laterality Date    COLONOSCOPY      HERNIA REPAIR Right     Inguinal Hernia Repair     Social History     Tobacco Use    Smoking status: Former     Current packs/day: 0.00     Average packs/day: 0.1 packs/day for 6.0 years (0.6 ttl pk-yrs)     Types: Cigarettes     Start date:      Quit date:      Years since quittin.2    Smokeless tobacco: Never   Vaping Use    Vaping status: Never Used   Substance Use Topics    Alcohol use: Not Currently     Comment: rare    Drug use: Never     Family History   Problem Relation Name Age of Onset    Dementia Mother      Heart attack Father       No Known Allergies  Current Outpatient Medications   Medication Instructions    atorvastatin (Lipitor) 10 mg tablet Daily    carbidopa-levodopa (Sinemet CR)  mg ER tablet Take 1 tab at 10pm bedtime x 2 weeks if needed take 2 tabs at bedtime    carbidopa-levodopa (Sinemet)  mg tablet 1 tablet, oral, 6 times daily    entacapone (COMTAN) 200 mg, oral, 4 times daily, Take with carbidopa/levodopa         Vitals  /78   Pulse 71   Temp 36.6 °C (97.8 °F)   Resp 18   Ht 1.676 m (5' 6\")   Wt 63.5 kg (140 lb)   BMI 22.60 kg/m²       Physical Exam  Incisions are healing extremely well; c/d/I; some dyskinesias on exam; no tremor on RUE, no rigidity on RUE; mild rigidity on LUE; gait improved with some arm swinging on L and better strides    Results                  "

## 2025-04-11 ENCOUNTER — TELEPHONE (OUTPATIENT)
Dept: NEUROLOGY | Facility: CLINIC | Age: 70
End: 2025-04-11
Payer: MEDICARE

## 2025-04-11 DIAGNOSIS — G20.B2 PARKINSON'S DISEASE WITH DYSKINESIA AND FLUCTUATING MANIFESTATIONS: Primary | ICD-10-CM

## 2025-04-11 NOTE — TELEPHONE ENCOUNTER
Li & Hayden called. His balance is poor. He moved the DBS Settings from 2.1 to 2.2 the tremor is all gone but his balance is now worse than it was at 2.1.   Please advise  At [phone] (home)

## 2025-04-11 NOTE — TELEPHONE ENCOUNTER
Last few days stumbling and feels like he will fall and has had to catch him.     At one time was at 1.9, waited a few days and went to 2.0 and then the next day 2.1, then today went up to 2.2. Only increasing R side.  Tremor is very well controlled.   Balance is the main issue.     Taking Sinemet 1 tab q6hrs, 4 times a day.   FOG a little and unchanged.    PLAN  Discussed DBS not supposed to be increased to help balance.     Not usual but sometimes DBS can affect balance. Go back down to 1.9 and see how he does with lower. And then we can go up and see.    PT    Orders Placed This Encounter   Procedures    Referral to Physical Therapy

## 2025-04-28 ENCOUNTER — APPOINTMENT (OUTPATIENT)
Dept: NEUROLOGY | Facility: CLINIC | Age: 70
End: 2025-04-28
Payer: MEDICARE

## 2025-04-28 VITALS
WEIGHT: 139 LBS | HEART RATE: 74 BPM | RESPIRATION RATE: 16 BRPM | DIASTOLIC BLOOD PRESSURE: 76 MMHG | BODY MASS INDEX: 22.44 KG/M2 | SYSTOLIC BLOOD PRESSURE: 149 MMHG

## 2025-04-28 DIAGNOSIS — G20.B2 PARKINSON'S DISEASE WITH DYSKINESIA AND FLUCTUATING MANIFESTATIONS: Primary | ICD-10-CM

## 2025-04-28 DIAGNOSIS — R53.83 FATIGUE, UNSPECIFIED TYPE: ICD-10-CM

## 2025-04-28 PROCEDURE — 95983 ALYS BRN NPGT PRGRMG 15 MIN: CPT | Performed by: NURSE PRACTITIONER

## 2025-04-28 PROCEDURE — 95984 ALYS BRN NPGT PRGRMG ADDL 15: CPT | Performed by: NURSE PRACTITIONER

## 2025-04-28 PROCEDURE — 99212 OFFICE O/P EST SF 10 MIN: CPT | Performed by: NURSE PRACTITIONER

## 2025-04-28 RX ORDER — CARBIDOPA AND LEVODOPA 25; 100 MG/1; MG/1
2 TABLET ORAL
Qty: 360 TABLET | Refills: 6 | Status: SHIPPED | OUTPATIENT
Start: 2025-04-28

## 2025-04-28 ASSESSMENT — UNIFIED PARKINSONS DISEASE RATING SCALE (UPDRS)
GAIT: 2
CHAIR_RISING_SCALE: 2
HANDMOVEMENTS_RIGHT: 1
RIGIDITY_NECK: 2
KINETIC_TREMOR_LEFTHAND: 0
TOTAL_SCORE: 36
RIGIDITY_RLE: 0
LEG_AGILITY_LEFT: 2
AMPLITUDE_LLE: 0
AMPLITUDE_LIP_JAW: 0
AMPLITUDE_RUE: 0
RIGIDITY_RUE: 1
AMPLITUDE_RLE: 0
POSTURE: 3
TOETAPPING_LEFT: 3
TOETAPPING_RIGHT: 1
SPONTANEITY_OF_MOVEMENT: 3
FINGER_TAPPING_RIGHT: 1
LEVODOPA: YES
FREEZING_GAIT: 0
SPEECH: 3
MINUTES_SINCE_LEVODOPA: 5HRS
PRONATION_SUPINATION_RIGHT: 0
POSTURAL_STABILITY: 0
PRONATION_SUPINATION_LEFT: 1
DYSKINESIAS_PRESENT: NO
LEG_AGILITY_RIGHT: 1
PARKINSONS_MEDS: YES
AMPLITUDE_LUE: 0
POSTURAL_TREMOR_LEFTHAND: 0
FACIAL_EXPRESSION: 3
RIGIDITY_LLE: 1
CONSTANCY_TREMOR_ATREST: 0
RIGIDITY_LUE: 2
POSTURAL_TREMOR_RIGHTHAND: 1
FINGER_TAPPING_LEFT: 2
KINETIC_TREMOR_RIGHTHAND: 0
CLINICAL_STATE: OFF

## 2025-04-28 ASSESSMENT — ENCOUNTER SYMPTOMS
LOSS OF SENSATION IN FEET: 0
OCCASIONAL FEELINGS OF UNSTEADINESS: 1
DEPRESSION: 0

## 2025-04-28 NOTE — PROGRESS NOTES
Subjective     Hayden Hester is a 69 y.o. year old male who presents with Parkinson's Disease, here for follow up visit.    HPI  Here for 2nd DBS programming s/p bilateral STN DBS with [employer] system 2/27/2025 by Dr. Fry.     Comes with wife.    Since last visit DBS lowered for dyskinesia and stopped entacapone.  Also he lowered Sinemet and was taking less often until we spoke and increased back up.   Also lowered DBS for balance and ordered PT    He was good in the beginning and then now is going backwards since 4/11 regarding balance issues.   Family (he, wife and daughters) are down/upset that he is not all better as they have seen videos on DBS where people become better quickly when turned on. We discussed expectations re: DBS.     Dyskinesia in RLE is an issue, swings when trying to go down on meds.     Balance (main issue), FOG and dyskinesia     Denies urinary IC  Denies cognitive issues    Off meds since 5am today.       MOTOR SYMPTOMS      +/-                            Comments  Motor sx overall  Worsening recently as above though still overall better since prior to DBS   Tremor + RUE not that bad  Jaw more than hand   Rigidity + Better than before but still there   Bradykinesia + Better than before but still there   Balance/gait + Sometimes    FOG + Occuring more often now  2x/day    Falls -    PT + Pending    Exercise         NON MOTOR SYMPTOMS       +/-                               Comments  Orthostatic lightheadedness  -    Cognitive -    Insomnia -    RBD -    Depression -    Anxiety -    Fatigue + Sometimes   Wondering if d/t PD  Snores sometimes  Never had a sleep study   Sialorrhea + sometimes   Hypophonia + sometimes   Dysphagia -    Constipation + Sometimes  BM qother day   Urinary dysfunction -         Social  Lives with: wife  Help with ADLs:          [facility] Meds  Med Dose Time   Carbidopa-levodopa 25-250mg 1 tab 6x/day Q4hrs    Entacapone 200mg  1 tab 3 times a day     Carbidopa-levodopa CR 25/100mg   tab at bedtime 10pm   Latency 30mins    Wearing off 4hrs  (Was 3-3.5hrs)    Side effects     Dyskinesia RLE only, mostly in the evening    Hallucinations None    Other None      Prior medications:  Ropinirole - helped slightly in 2018, changed to levodopa in 2019    Prior workup  MR brain w and wo IV contrast  Result Date: 10/24/2024  Surgical planning scan.   1. Disproportionate enlargement of the supratentorial ventricles compared to the adjacent sulci and mild enlargement of the 4th ventricle in a pattern that is suggestive of ventriculomegaly related to likely chronic ventricular enlargement possibly from prior infectious/inflammatory process. Alternatively, ventriculomegaly could also be related to central white matter volume loss. Sequela of normal pressure hydrocephalus is another consideration.   2. Mild signal abnormality located within the supratentorial periventricular white matter may be sequela of chronic small vessel ischemic changes with subtle transependymal flow not excluded but favored less likely.   3. No abnormal intracranial enhancement or mass.   This study was interpreted at [facility].   MACRO: None   Signed by: Valerio Bass 10/24/2024 8:00 AM Dictation workstation:   [id number]      Neuropsychology testing Dr. Godoy 9/12/24:  He appears to be a good candidate for DBS from the neuropsychological standpoint.   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     LRT 9/11/24:  He took 2 tabs Sinemet and was only 4.5hrs off meds  OFF-44, ON- 30, % change: 32%      Patient Health Questionnaire-2 Score: 0            Current Outpatient Medications:     atorvastatin (Lipitor) 10 mg  tablet, Take by mouth once daily. (Patient not taking: Reported on 2025), Disp: , Rfl:     carbidopa-levodopa (Sinemet CR)  mg ER tablet, Take 1 tab at 10pm bedtime x 2 weeks if needed take 2 tabs at bedtime, Disp: 180 tablet, Rfl: 3    carbidopa-levodopa (Sinemet)  mg tablet, Take 2 tablets by mouth 6 times a day., Disp: 360 tablet, Rfl: 6    carbidopa-levodopa (Sinemet)  mg tablet, Take 1 tablet by mouth 6 times a day., Disp: 540 tablet, Rfl: 1    entacapone (Comtan) 200 mg tablet, Take 1 tablet (200 mg) by mouth 4 times a day. Take with carbidopa/levodopa, Disp: 360 tablet, Rfl: 3       Objective   Vitals:    25 0938 25 0941   BP: 134/79 149/76   BP Location: Left arm Left arm   Patient Position: Sitting Standing   BP Cuff Size: Adult Adult   Pulse: 73 74   Resp: 16    Weight: 63 kg (139 lb)                  Physical Exam    MDS UPDRS 1st Score: Motor Examination  Is the patient on medication for treating the symptoms of Parkinson's Disease?: Yes  Patients receiving medication for treating the symptoms of Parkinson's Disease, chely the patient's clinical state.: Off  Is the patient on Levodopa?: Yes  Minutes since last Levodopa dose: 5hrs  Speech: 3  Facial Expression: 3  Rigidty Neck: 2  Rigidty RUE: 1  Rigidity - LUE: 2  Rigidity RLE: 0  Rigidity LLE: 1  Finger Tapping Right Hand: 1  Finger Tapping Left Hand: 2  Hand Movements- Right Hand: 1  Hand Movements- Left Hand: 1  Pronatiaon-Supination Movments - Right Hand: 0  Pronatiaon-Supination Movments Left Hand: 1  Toe Tapping Right Foot: 1  Toe Tapping - Left Foot: 3  Leg Agility - Right Le  Leg Agility - Left le  Arising from Chair: 2  Gait: 2  Freezing of Gait: 0  Postural Stability: 0 (not tested)  Posture: 3  Global Spontanteity of Movment ( Body Bradykinesia): 3  Postural Tremor - Right Hand: 1  Postural Tremor - Left hand: 0  Kinetic Tremor - Right hand: 0  Kinetic Tremor - Left hand: 0  Rest Tremor Amplitude -  "RUE: 0  Rest Tremor Amplitude - LUE: 0  Rest Tremor Amplitude - RLE: 0  Rest Tremor Amplitude - LLE: 0  Rest Tremor Amplitude - Lip/Jaw: 0  Constancy of Rest Tremor: 0  MDS UPDRS Total Score: 36  Were dyskinesias (chorea or dystonia) present during examination?: No        DBS Procedure:  [place]  IPyrs1M  Impedance: all OK    Initial Settings:  Group A  L STN  C+ 1- 2.0/60/130 ((0-2.5))  R STN  C+ 9- 0.8/60/130 ((0-1.4))    Programmina-4.5 tremor improves, 6.0 tremor marked improvement (breaks through sometimes), bradykinesia improves (almost resolved), rigidity resolved--brain signals continue to improve  2b- 3.0 tremor improves mildly, SE dyskinesia   2c- no improvement and dyskinesia at 6.0    1a-2a- 3.0 marked improvement in tremor , SE at 6.0 feeling weird     9-10- 1.0 bradykinesia improves, SE 1.5 he felt dizzy    Future programming  2-3-  0- and 3- also improved tremor  0-1-  1-2-  1ab2ab  9&10 segments    Final Settings:  Group C-- 25  L STN   C+1a-2a- 1.5/1.5 (4.5)  R STN  C+9-10- 1.0 ((0-1.5))    Group A- 3/26/25  Group A  L STN  C+ 1- 2.0/60/130 ((0-2.5))  R STN  C+ 9- 0.8/60/130 ((0-1.4))        ----------------Previous Programming-----------------------------  3/26/25  [employer]   L STN 1&2 best signals and A segment  R STN shows gamma (dyskinesia( and signals not as good, possibly 8,10b&C seg slightly higher    Initial monopolar review:  L STN ---when RUE tremor resolves bradykinesia also improved, BLE leg tremors start to come out as meds wear off  0- 3.0 (no tremor but is intermittent) and SE R face tightness--worse at 3.5  1- 1.5 tremor improved (mild action), 2.0 rigidity improved (1+), 2.5 tremor and rigidity resolve, SE 4.0 \"weird\"   2-2.0, rigidity resolved, 2.5 action tremor resolves, rest tremor appeared to resolve---3.5 resting tremor breaks through and is constant (appeared to be good before this), SE 5.0 feels weird   3- 1.0 tremor resolves??, 5.0 action tremor " resolves, rest tremor did not break through    1a- 2.5 tremor improved (1+intermittent, action still 1+),   1b- 4.0 rigidity resolves but tremor still often, no SE at 5.0  1c- 3.0 SE pain in the back of head     R STN--bradykinesia and rigidity   8- 1.5 rigidity resolved, SE 3.0 facial pulling  9- 3.0 seems worse but not much improvement before, SE 3.0 weird in LUE  10- no SE or improvement at 5.0  11- no SE or improvement at 5.0    Tried the following:  C+ 1- 2.0/60/130 ((0-2.5))  C+ 8- 0.8/60/130---feels weird and better when off so changed contact to 9- at 1.0 and still feels weird, out of control, like he needs medicine (but took 30min ago) and turned DBS and walked and still felt this way      Final Settings  L STN  C+ 1- 2.0/60/130 ((0-2.5))  R STN  C+ 9- 0.8/60/130 ((0-1.4))        Assessment/Plan   Mr. Hayden Hester is a 69 y.o. M with Parkinson's disease here for 2nd DBS programming s/p bilateral STN DBS with [employer] system 2/27/2025 by Dr. Fry.      Parkinson's: he notes worsening but overall improved motor sx since surgery  Marked worsening of tremor with DBS off  Dyskinesia in RLE is an issue, swings when trying to go down on meds- DBS adjusted for this  Slowly lower meds as tolerated (for dyskinesia)  Waiting until next week to start this  Balance (main issue), FOG  Discussed unlikely to respond to DBS  Caution lowering meds too much if this worsens  PT once cleared by Dr. Fry (LSVT ordered, schedule for after FUV with her)  Enlarged Ventricles on MRI  Not likely NPH as denies urinary IC and denies cognitive issues and assessed for this today  He and wife wondering since balance is worsening but could be d/t worsening dyskinesia  Discussed monitoring sx    Diagnoses and all orders for this visit:  Parkinson's disease with dyskinesia and fluctuating manifestations  -     carbidopa-levodopa (Sinemet)  mg tablet; Take 2 tablets by mouth 6 times a day.  Fatigue, unspecified type      #Change the  LSVT PT to after apt with Dr. Fry who will officially clear them for PT    #Decrease by STOPPING Carbidopa-levodopa 25-250mg and starting Carbidopa-levodopa 25/100mg 2 tabs 6 times a day.     If doing well can go down to 5 times a day.     #You are going home on group 4/28/25 the only group with therapy to use.    Can always go back to group A 3/26/25    If side effects just on one side, then OK to lower that side a click (0.1v) every few mins until resolves.    You have the ability to increase the amplitude on right or left side as needed for tremor, stiffness, slowness of movement.    You can increase by 0.1V every few days for one side of your body as needed. Increasing one side at a time allows you to notice if side effect with that increase BEFORE you adjust the other side.   You can also decrease if any side effects at any time (dyskinesia or other).  Decrease as quickly as you need to at any time.     You can TURN OFF the DBS at any time if any concern for side effect/worsening symptoms.      #Parkinson's can cause fatigue but you should still get routine labs (CBC, CMP) that also include B12, vitamin D, thyroid and iron studies with PCP to rule out other causes. If still no cause is found consider seeing the sleep specialist to be sure there are not sleep related issues causing fatigue (such as sleep apnea).     Fatigue and Parkinson's  [email or web address]/sites/default/files/fatigue-[email or web address].pdf    #Follow up in 2-4Wwith me for 2 hr DBS      Total time of 55 minutes for today's visit, of which 42 mins were spent with DBS programming as noted above-checking settings, stimulator events, energy level, and impedances and updating settings in the chart.  IMADDISON, personally performed  a medically appropriate exam, discussion of care/treatment options for remaining time.    Young Mckeon, NP-C  Adult/Gerontological Nurse Practitioner   [facility], Department of  Neurology  [facility] [place]  [facility]  [address] [place] [address]  Phone: [phone]  Fax: [phone]

## 2025-04-28 NOTE — PATIENT INSTRUCTIONS
#Change the LSVT PT to after apt with Dr. Fry who will officially clear them for PT    #Decrease by STOPPING Carbidopa-levodopa 25-250mg and starting Carbidopa-levodopa 25/100mg 2 tabs 6 times a day.     If doing well can go down to 5 times a day.     #You are going home on group 4/28/25 the only group with therapy to use.    Can always go back to group A 3/26/25    If side effects just on one side, then OK to lower that side a click (0.1v) every few mins until resolves.    You have the ability to increase the amplitude on right or left side as needed for tremor, stiffness, slowness of movement.    You can increase by 0.1V every few days for one side of your body as needed. Increasing one side at a time allows you to notice if side effect with that increase BEFORE you adjust the other side.   You can also decrease if any side effects at any time (dyskinesia or other).  Decrease as quickly as you need to at any time.     You can TURN OFF the DBS at any time if any concern for side effect/worsening symptoms.      #Parkinson's can cause fatigue but you should still get routine labs (CBC, CMP) that also include B12, vitamin D, thyroid and iron studies with PCP to rule out other causes. If still no cause is found consider seeing the sleep specialist to be sure there are not sleep related issues causing fatigue (such as sleep apnea).     Fatigue and Parkinson's  [email or web address]://[email or web address]/sites/default/files/fatigue-[email or web address]    #Follow up in 2-4Wwith me for 2 hr DBS    Young Mckeon NP-C  Adult/Gerontological Nurse Practitioner   [facility], Department of Neurology  [facility] [place]  [facility]  [address]  [place] [id number]  Phone: [phone]  Fax: [phone]

## 2025-05-02 ENCOUNTER — TELEPHONE (OUTPATIENT)
Dept: NEUROLOGY | Facility: CLINIC | Age: 70
End: 2025-05-02
Payer: MEDICARE

## 2025-05-02 NOTE — TELEPHONE ENCOUNTER
Came home MOnday, R sided body dyskinesia. Lowered settings only from 4.5 to 4.3     Went back to group A  Tuesday and Wed on group A and continued regular meds     Thursday wife was with him and went back to C and started lower dose of meds 2 tabs Sinemet 25/100mg and did OK for 1 hr and then 9am he had dyskinesia (R arm and R leg) and had jaw tremor and difficulty walking, 10am they lowered it down further to 4.0    Lowering made his legs feel loose and worried he would fall, kept lowering amp and by bedtime 2.4 and dyskinesia was milder and slowed down on walking     Dyskinesia was not better or worse with meds.       Went back to group A  OK to try lower Sinemet and see how he does and if needs to can go back to lower dose.     She can retry C once at lower dose for a longer period of time and increase as tolerated, if dyskinesia just go back to A.    2hr programming with me as scheduled

## 2025-05-02 NOTE — TELEPHONE ENCOUNTER
Li called. They went home on Group C and he had dyskensia all day & trouble walking. They lowered the settings on Group C.  Then moved to Group A.  She would like to know if he should go back to his former Carbidopa/ Levodopa 25/250 script or stay on Carbidopa/ Levodopa 25/100

## 2025-05-14 ENCOUNTER — APPOINTMENT (OUTPATIENT)
Dept: PHYSICAL THERAPY | Facility: CLINIC | Age: 70
End: 2025-05-14
Payer: MEDICARE

## 2025-05-15 NOTE — PROGRESS NOTES
[facility]   Neurosurgery    Diagnosis  {There are no diagnoses linked to this encounter. (Refresh or delete this SmartLink)}    Patient Discussion/Summary      Provider Impressions      History of Present Illness  Chief Complaint:   Chief Complaint   Patient presents with    Follow-up         HPI: Hayden Hester is a 69 y.o. right-handed male with PD since 2015, with symptoms of RUE tremor, bradykinesia, dyskinesia, rigidity, and motor fluctuations. Of note, patient had notable ventricular dilation on preop MRI brain; however, upon further discussion at DBS conference and with patient of his findings and symptoms, it was felt that there was no need for NPH workup. Patient is now s/p b/l STN DBS w/ MDT [employer] on 2/27/25. Following his initial programming visit he had notably worse dyskinesias. Patient has since had another programming visit on 4/28 with excellent relief of his tremor, and DBS and medications were adjusted for his RLE dyskinesia. However, patient was still bothered by his balance and FOG. Patient presents today for follow up wound check.     Patient reports to be doing overall well since surgery. He feels that his tremor is well controlled, and that his balance issues have improved somewhat since having additional programming and medication adjustments. He also reports that he is scheduled to start PT on Monday. He denies any significant concerns today.       ROS: As noted in HPI.      Previous History  Medical History[1]  Surgical History[2]  Social History[3]  Family History[4]  Allergies[5]  Current Outpatient Medications   Medication Instructions    atorvastatin (Lipitor) 10 mg tablet Daily    carbidopa-levodopa (Sinemet CR)  mg ER tablet Take 1 tab at 10pm bedtime x 2 weeks if needed take 2 tabs at bedtime    carbidopa-levodopa (Sinemet)  mg tablet 2 tablets, oral, 6 times daily    carbidopa-levodopa (Sinemet)  mg tablet 1 tablet, oral, 6 times daily    entacapone  "(COMTAN) 200 mg, oral, 4 times daily, Take with carbidopa/levodopa         Vitals  /78   Pulse 67   Resp 18   Ht 1.676 m (5' 6\")   Wt 63.5 kg (140 lb)   BMI 22.60 kg/m²       Physical Exam  Well appearing, in no acute distress. Incisions all well healed. Tremor of chin/mouth. No notable tremor of hands on exam today.       Results                          [1]   Past Medical History:  Diagnosis Date    Hyperlipidemia, unspecified     Diet-controlled hyperlipidemia    Memory loss or impairment     Parkinson's disease    [2]   Past Surgical History:  Procedure Laterality Date    COLONOSCOPY      HERNIA REPAIR Right     Inguinal Hernia Repair   [3]   Social History  Tobacco Use    Smoking status: Former     Current packs/day: 0.00     Average packs/day: 0.1 packs/day for 6.0 years (0.6 ttl pk-yrs)     Types: Cigarettes     Start date:      Quit date:      Years since quittin.4    Smokeless tobacco: Never   Vaping Use    Vaping status: Never Used   Substance Use Topics    Alcohol use: Not Currently     Comment: rare    Drug use: Never   [4]   Family History  Problem Relation Name Age of Onset    Dementia Mother      Heart attack Father     [5] No Known Allergies    " Currently     Comment: rare    Drug use: Never   [4]   Family History  Problem Relation Name Age of Onset    Dementia Mother      Heart attack Father     [5] No Known Allergies

## 2025-05-16 ENCOUNTER — OFFICE VISIT (OUTPATIENT)
Dept: NEUROSURGERY | Facility: CLINIC | Age: 70
End: 2025-05-16
Payer: MEDICARE

## 2025-05-16 VITALS
WEIGHT: 140 LBS | BODY MASS INDEX: 22.5 KG/M2 | HEIGHT: 66 IN | RESPIRATION RATE: 18 BRPM | HEART RATE: 67 BPM | SYSTOLIC BLOOD PRESSURE: 116 MMHG | DIASTOLIC BLOOD PRESSURE: 78 MMHG

## 2025-05-16 DIAGNOSIS — G20.B2 PARKINSON'S DISEASE WITH DYSKINESIA AND FLUCTUATING MANIFESTATIONS: Primary | ICD-10-CM

## 2025-05-16 PROCEDURE — 1126F AMNT PAIN NOTED NONE PRSNT: CPT | Performed by: NEUROLOGICAL SURGERY

## 2025-05-16 PROCEDURE — 3008F BODY MASS INDEX DOCD: CPT | Performed by: NEUROLOGICAL SURGERY

## 2025-05-16 PROCEDURE — 99211 OFF/OP EST MAY X REQ PHY/QHP: CPT | Performed by: NEUROLOGICAL SURGERY

## 2025-05-16 ASSESSMENT — PAIN SCALES - GENERAL: PAINLEVEL_OUTOF10: 0-NO PAIN

## 2025-05-19 ENCOUNTER — EVALUATION (OUTPATIENT)
Dept: PHYSICAL THERAPY | Facility: CLINIC | Age: 70
End: 2025-05-19
Payer: MEDICARE

## 2025-05-19 VITALS — DIASTOLIC BLOOD PRESSURE: 66 MMHG | HEART RATE: 64 BPM | SYSTOLIC BLOOD PRESSURE: 122 MMHG

## 2025-05-19 DIAGNOSIS — R26.89 IMBALANCE: ICD-10-CM

## 2025-05-19 DIAGNOSIS — G20.B2 PARKINSON'S DISEASE WITH DYSKINESIA AND FLUCTUATING MANIFESTATIONS: ICD-10-CM

## 2025-05-19 DIAGNOSIS — R26.9 GAIT ABNORMALITY: ICD-10-CM

## 2025-05-19 DIAGNOSIS — R26.81 UNSTEADINESS ON FEET: Primary | ICD-10-CM

## 2025-05-19 PROCEDURE — 97161 PT EVAL LOW COMPLEX 20 MIN: CPT | Mod: GP | Performed by: PHYSICAL THERAPIST

## 2025-05-19 PROCEDURE — 97112 NEUROMUSCULAR REEDUCATION: CPT | Mod: GP | Performed by: PHYSICAL THERAPIST

## 2025-05-19 ASSESSMENT — ENCOUNTER SYMPTOMS
DEPRESSION: 0
LOSS OF SENSATION IN FEET: 0
OCCASIONAL FEELINGS OF UNSTEADINESS: 1

## 2025-05-19 ASSESSMENT — PAIN SCALES - GENERAL: PAINLEVEL_OUTOF10: 0 - NO PAIN

## 2025-05-19 ASSESSMENT — PAIN - FUNCTIONAL ASSESSMENT: PAIN_FUNCTIONAL_ASSESSMENT: 0-10

## 2025-05-19 NOTE — PROGRESS NOTES
Physical Therapy    Physical Therapy Evaluation and Treatment      Patient Name: Hayden Hester  MRN: [id number]  Today's Date: 5/22/2025  Refugio MCR-Auth required  Primary dx=unsteadiness on feet  Time Entry:   Time Calculation  Start Time: 1440  Stop Time: 1535  Time Calculation (min): 55 min  PT Evaluation Time Entry  PT Evaluation (Low) Time Entry: 30  PT Therapeutic Procedures Time Entry  Neuromuscular Re-Education Time Entry: 25        Assessment:  Patient is a 69 year old male referred to [facility]'s outpatient physical therapy services with a chief complaint of imbalance and unsteadiness on his feet. The patient has a diagnosis of Parkinson Disease in addition to memory deficits and recently underwent DBS placement on 2/27/25.  The patient has never had Parkinson specific physical therapy and currently does not have an exercise program.  Mr. Hester is presenting with abnormal gait pattern including reduced bilateral step height, decreased bilateral heel strike, FOG, difficulty with turn negotiation placing him at a risk for falls. Mr. Hester is presenting with retropulsion during sit to stands and requiring minimal assist for safety during the 30 second sit to stand test. The patient also scored in fall risk category on the TUG requiring 14.5 seconds compared to cut off score of 13.5 seconds.  Mr. Hester will highly benefit from physical therapy services to provide education, improve quality of gait, improve balance and decrease fall risk in his home.        Plan: 10MWT, FGA, 6MWT  OP PT Plan  Treatment/Interventions: Canalith repositioning, Cryotherapy, Dry needling, Education/ Instruction, Gait training, Hot pack, Manual therapy, Neuromuscular re-education, Self care/ home management, Taping techniques, Therapeutic activities, Therapeutic exercises  PT Plan: Skilled PT  PT Frequency: 2 times per week  Duration: 16 visits  Onset Date: 04/11/25  Certification Period Start Date: 05/19/25  Certification  "Period End Date: 07/17/25  Rehab Potential: Good    Current Problem:   1. Unsteadiness on feet        2. Parkinson's disease with dyskinesia and fluctuating manifestations  Referral to Physical Therapy    Follow Up In Physical Therapy      3. Gait abnormality        4. Imbalance            Subjective    Patient Report: Pt. Has been seeing [facility] for adjustments on his DBS.  He is doing better now than the first time.  Per spouse there is still room for improvement.  He was diagnosed with with PD in 2015 and now sees Dr. Arriaga. He takes carbidopa/levadopa every 4 hours and they lowered it recently.  Per spouse the patient is very emotional, has always cried easily. When he did testing for DBS, they found extra fluid in his brain but they dont think he has NPH, he got opinions from both  and .  He doesn't feel with any major lightheadedness or double vision.  They have been cahnging things with the DBS on the right side because that was the problem.  Pt. Spouse \"Li\" present during evaluation providing moderate assist with history due to pt. With executive function deficits.    CLOF: has FOG episodes and isn't happening as often since the DBS, he freezes with tight spaces such as a corner of the room.  He uses a walker to go to the restroom at night. HE gets dressed on his own.  His hsirt gets stuck sometimes. He cannot do buttons  Home Setup:  Has 3 kids in town,  28, 26 and 24 years old lives with spouse \"li\" 3 AIDA to house from garage, 8 steps to get to bedrooms and 1 step from kitchen to living room.  Railings for 8 steps but no railing on 3 steps. He has a walk in shower with a small step with a grab bar  Equipment: he has a walker and a cane but doesn't use anything.  He ends up carrying the cane.  Hobbies: computer, TV and nap time.  Physical Activity: before he used to walk a lot but not since he got diagnosed with PD.  He does chair exercise and has done this in the past.   Occupation: semi-retired, " "owns a [employer]  Sleep: goes to the bathroom 1-2 times a day.  Hydration: per spouse \"not drinking enough\"  Falls: none in past year  Pt. Goal: \"improve walking and balance; improve confidence, wants to be able to golf and go to subway, go to [employer]\"    HPI: [facility] visit neurology 3/26/25  \"Assessment/Plan  Mr. Hayden Hester is a 69 y.o. M with Parkinson's disease here for initial DBS programming s/p bilateral STN DBS with Medtronic system 2/27/2025 by Dr. Fry. Motor fluctuation with dyskinesia and uncontrolled RUE tremor are his main issues, FOG improved since surgery (had a few weeks of good lesion effect). Good benefit on exam today, good thresholds, DBS turned on today.     Will continue meds as are with patient trying to space them out by 30min as a time as he feels is tolerable, slowly decreasing meds and increasing stimulation.      NPH w/u with second opinion at [facility], not symptomatic        Diagnoses and all orders for this visit:  Parkinson's disease with dyskinesia and fluctuating manifestations\"     Precautions:  mod fall risk, executive function deficits     Vital Signs: 122/66 sitting BP, HR=64 BPM     Pain:0/10          Objective   Cognition: hx. executive deficits     observation= pt.arrived to visit with spouse present  Posture=moderately forward head, rounded shoulders  Tremor=resting tremor on R side  Tone (Modified Darren Scale)=dystonia noted on R side  Coordination= finger opposition mildly impaired coordination on L and WNL on R, pronation/supination WNL bilaterally, DF/PF mild impairment on R in speed and severe coordination on the L  Vision=wears glasses for reading  Hearing=WNL  Edema=neg.  Gait=wide CLAUS, difficulty with turn negotiation, decreased L>R arm swing, decreased bilateral step height, decreased bilateral heel strike  Stand pivot transfers= SBA with VC for both legs and Ue's to touch chair before sitting down    Lower Extremity Strength MMT (tested in " "seated)    Left Right   Hip flexion 4+/5 4+/5   Hip ADD  4/5 4/5   Hip ABD 4/5 4/5   Knee flexion 4+/5 4/5   Knee Extension 4+/5 4/5   Ankle Dorsiflexion 4/5 4/5   Ankle Plantarflexion 4/5 4/5   Ankle Inversion 4/5 4/5   Ankle Eversion 4/5 4/5      Outcome Measures:  TUG=14.5 seconds with no device with shuffling steps, difficulty with turn at SBA  30 second sit<>Stand test=9 reps with BUE support with min-A, retropulsion with LOB posteriorly for all reps    *due to time constraints further functional outcome measures will be performed in next 1-2 visits    Treatments:      Neuro Re-Ed:  -sit<>stands with PT demo x5 reps with unilateral UE support  -sit<>Stands with BUE support with PT demo on proper technique  -educated pt. And spouse on \"feed forward\" cues for counting during FOG episodes and \"back of legs and arms touch\" for stand pivot transfers to sit down  -educated pt. And spouse on various ways to address FOG such as self counting, spouse counting, weight shifting  -educated pt. And spouse on retropulsion, posterior displaced COG  -educated pt. And spouse on neuroplasticty, working meaningful tasks in physical therapy to make brain change  -educated pt. And spouse on benefits of PD specific exercise  -PT demo for inc. Step height, wide CLAUS and \"BIG steps during functional mobility    EDUCATION:  -see treatment       Goals:  Active       PT Problem       PT Goal 1 STG:Pt. Will spouse will independent in supervising HEP in next 1-2 visits to promote self efficacy, manage symptoms and meet other LTG.        Start:  05/22/25    Expected End:  08/16/25            PT Goal 2: STG: Pt. Will improve TUG with <13.5 seconds with no device in order to meet fall risk cut off score       Start:  05/22/25    Expected End:  08/16/25            PT Goal 3: STG: Pt. Will improve 30 second sit to stand test by 1 repetitions to meet MCID for significant change compared to normative values and indicate improved functional " "strength.          Start:  05/22/25    Expected End:  08/16/25            PT Goal 4: STG: Pt. will complete FGA in next 1-2 visits       Start:  05/22/25    Expected End:  08/16/25            PT Goal 5 STG: pt. will complete 6MWT in next 1-2 visits       Start:  05/22/25    Expected End:  08/16/25            Patient Stated Goal 1 LTG: \"improve walking and balance; improve confidence, wants to be able to golf and go to subway, go to [employer]\"       Start:  05/22/25    Expected End:  08/16/25            PT Goal LTG 2: Pt. Will improve TUG by 3.4 seconds with LRAD in order to meet MCID and indicate improved transfer and gait efficiency.        Start:  05/22/25    Expected End:  08/16/25                PT Goal LTG 3: Pt. Will improve 30 second sit to stand test by 2 repetitions to meet MCID for significant change compared to normative values and indicate improved functional strength.          Start:  05/22/25    Expected End:  08/16/25                          "

## 2025-05-22 PROBLEM — R26.89 IMBALANCE: Status: ACTIVE | Noted: 2025-05-22

## 2025-05-22 PROBLEM — R26.9 GAIT ABNORMALITY: Status: ACTIVE | Noted: 2025-05-22

## 2025-05-22 PROBLEM — R26.81 UNSTEADINESS ON FEET: Status: ACTIVE | Noted: 2025-05-22

## 2025-05-30 ENCOUNTER — APPOINTMENT (OUTPATIENT)
Dept: NEUROSURGERY | Facility: CLINIC | Age: 70
End: 2025-05-30
Payer: MEDICARE

## 2025-06-02 ENCOUNTER — APPOINTMENT (OUTPATIENT)
Dept: NEUROLOGY | Facility: CLINIC | Age: 70
End: 2025-06-02
Payer: MEDICARE

## 2025-06-02 ENCOUNTER — PATIENT MESSAGE (OUTPATIENT)
Dept: NEUROLOGY | Facility: CLINIC | Age: 70
End: 2025-06-02

## 2025-06-02 VITALS
WEIGHT: 138 LBS | RESPIRATION RATE: 16 BRPM | BODY MASS INDEX: 22.27 KG/M2 | HEART RATE: 72 BPM | DIASTOLIC BLOOD PRESSURE: 70 MMHG | SYSTOLIC BLOOD PRESSURE: 133 MMHG

## 2025-06-02 DIAGNOSIS — G20.B2 PARKINSON'S DISEASE WITH DYSKINESIA AND FLUCTUATING MANIFESTATIONS: Primary | ICD-10-CM

## 2025-06-02 PROCEDURE — 95983 ALYS BRN NPGT PRGRMG 15 MIN: CPT | Performed by: NURSE PRACTITIONER

## 2025-06-02 PROCEDURE — 95984 ALYS BRN NPGT PRGRMG ADDL 15: CPT | Performed by: NURSE PRACTITIONER

## 2025-06-02 PROCEDURE — 99212 OFFICE O/P EST SF 10 MIN: CPT | Performed by: NURSE PRACTITIONER

## 2025-06-02 ASSESSMENT — UNIFIED PARKINSONS DISEASE RATING SCALE (UPDRS)
KINETIC_TREMOR_LEFTHAND: 1
AMPLITUDE_LIP_JAW: 2
CONSTANCY_TREMOR_ATREST: 4
POSTURAL_TREMOR_LEFTHAND: 0
CHAIR_RISING_SCALE: 2
AMPLITUDE_LLE: 0
SPONTANEITY_OF_MOVEMENT: 2
RIGIDITY_LUE: 2
HANDMOVEMENTS_RIGHT: 1
RIGIDITY_NECK: 0
TOETAPPING_RIGHT: 1
RIGIDITY_RLE: 0
AMPLITUDE_RLE: 0
TOTAL_SCORE: 44
POSTURE: 3
CLINICAL_STATE: OFF
MINUTES_SINCE_LEVODOPA: 4.5HRS
RIGIDITY_LLE: 0
FINGER_TAPPING_LEFT: 1
LEG_AGILITY_RIGHT: 1
FACIAL_EXPRESSION: 3
LEG_AGILITY_LEFT: 2
PRONATION_SUPINATION_RIGHT: 1
TOETAPPING_LEFT: 3
LEVODOPA: YES
AMPLITUDE_RUE: 2
KINETIC_TREMOR_RIGHTHAND: 1
FREEZING_GAIT: 0
GAIT: 2
PRONATION_SUPINATION_LEFT: 2
DYSKINESIAS_PRESENT: NO
FINGER_TAPPING_RIGHT: 0
PARKINSONS_MEDS: YES
AMPLITUDE_LUE: 0
RIGIDITY_RUE: 2
POSTURAL_STABILITY: 3
POSTURAL_TREMOR_RIGHTHAND: 0
SPEECH: 2

## 2025-06-02 ASSESSMENT — ENCOUNTER SYMPTOMS
DEPRESSION: 0
OCCASIONAL FEELINGS OF UNSTEADINESS: 1
LOSS OF SENSATION IN FEET: 0

## 2025-06-02 NOTE — PATIENT INSTRUCTIONS
"nned    #Continue Carbidopa-levodopa 25/100mg 2 tabs 6 times a day.     If doing well can go down to 5 times a day.     #Controlled release Carbidopa-levodopa 25/100mg 2 tabs at 10pm ((INSTEAD of the immediate release))    #You are going home  on group Dual adapt 6/2/25    Can always go back to group \"baseline\" and manually adjust as needed     You cannot adjust the adapt groups--they adjust on their own    If side effects just on one side, then OK to lower that side a click (0.1v) every few mins until resolves.    You have the ability to increase the amplitude on right or left side as needed for tremor, stiffness, slowness of movement.    You can increase by 0.1V every few days for one side of your body as needed. Increasing one side at a time allows you to notice if side effect with that increase BEFORE you adjust the other side.   You can also decrease if any side effects at any time (dyskinesia or other).  Decrease as quickly as you need to at any time.     You can TURN OFF the DBS at any time if any concern for side effect/worsening symptoms.      Young Mckeon, MADDISON-C  Adult/Gerontological Nurse Practitioner   [facility], Department of Neurology  [facility] [place]  [facility]  [address] [place] [address]  Phone: [phone]  Fax: [phone]  "

## 2025-06-02 NOTE — PROGRESS NOTES
Subjective     Hayden Hester is a 70 y.o. year old male who presents with Parkinson's Disease, here for follow up visit.    HPI  Last visit 4/28/25 with me:  #Change the LSVT PT to after apt with Dr. Fry who will officially clear them for PT  #Decrease by STOPPING Carbidopa-levodopa 25-250mg and starting Carbidopa-levodopa 25/100mg 2 tabs 6 times a day.   If doing well can go down to 5 times a day.   #You are going home on group 4/28/25 the only group with therapy to use.  Can always go back to group A 3/26/25    Here for 3rd DBS programming s/p bilateral STN DBS with Medtronic system 2/27/2025 by Dr. Fry.     Comes with wife.    Had to go back to previous group d/t dyskinesia--R sided only.     Balance (main issue), getting out of bed, FOG--not as often, jaw tremor when off meds  Tremor is not the issue. Only when off medicine.  Walking, difficulty getting out of bed.   RLE dyskinesia not all day and hard to say when. Better than it used to be maybe more in the evening.     During the day, alert, moving well, late evening around dinner time, slows down  Urinating in the night 2x      MOTOR SYMPTOMS      +/-                            Comments  Motor sx overall  Worsening recently as above though still overall better since prior to DBS   Tremor + Much improved  Eats with R hand but still some R tremor   Rigidity + Better than before but still there   Bradykinesia + Better than before but still there   Balance/gait + As above    FOG + improved   Falls -    PT + Had initial visit with Naila Pisano         NON MOTOR SYMPTOMS       +/-                               Comments  Orthostatic lightheadedness  -    Cognitive -    Insomnia -    RBD -    Depression -    Anxiety -    Fatigue +    Sialorrhea + sometimes   Hypophonia + sometimes   Dysphagia -    Constipation + Sometimes  On an doff   Urinary dysfunction -         Social  Lives with: wife  Help with ADLs:          [facility] Meds  Med Dose Time    Carbidopa-levodopa 25-100mg 2tabs 6 times a day  Q4hrs   [id number]-1om-6pm-10pm- 4am dose times--5-6hrs in the night he can go without it        Carbidopa-levodopa CR 25/100mg  2 tab at bedtime 10pm   Latency 30mins    Wearing off 4hrs  (Was 3-3.5hrs)    Side effects     Dyskinesia RLE only as above    Hallucinations None    Other None      Prior medications:  Ropinirole - helped slightly in 2018, changed to levodopa in 2019  Entacapone 1 tab 4 times a day    Prior workup  MR brain w and wo IV contrast  Result Date: 10/24/2024  Surgical planning scan.   1. Disproportionate enlargement of the supratentorial ventricles compared to the adjacent sulci and mild enlargement of the 4th ventricle in a pattern that is suggestive of ventriculomegaly related to likely chronic ventricular enlargement possibly from prior infectious/inflammatory process. Alternatively, ventriculomegaly could also be related to central white matter volume loss. Sequela of normal pressure hydrocephalus is another consideration.   2. Mild signal abnormality located within the supratentorial periventricular white matter may be sequela of chronic small vessel ischemic changes with subtle transependymal flow not excluded but favored less likely.   3. No abnormal intracranial enhancement or mass.   This study was interpreted at [facility].   MACRO: None   Signed by: Valerio Bass 10/24/2024 8:00 AM Dictation workstation:   [id number]      Neuropsychology testing Dr. Godoy 9/12/24:  He appears to be a good candidate for DBS from the neuropsychological standpoint.   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     LRT 9/11/24:  He took 2 tabs Sinemet and was only 4.5hrs  off meds  OFF-44, ON- 30, % change: 32%      Patient Health Questionnaire-2 Score: 0            Current Outpatient Medications:     atorvastatin (Lipitor) 10 mg tablet, Take by mouth once daily. (Patient not taking: Reported on 2025), Disp: , Rfl:     carbidopa-levodopa (Sinemet CR)  mg ER tablet, Take 1 tab at 10pm bedtime x 2 weeks if needed take 2 tabs at bedtime, Disp: 180 tablet, Rfl: 3    carbidopa-levodopa (Sinemet)  mg tablet, Take 2 tablets by mouth 6 times a day., Disp: 360 tablet, Rfl: 6    carbidopa-levodopa (Sinemet)  mg tablet, Take 1 tablet by mouth 6 times a day., Disp: 540 tablet, Rfl: 1    entacapone (Comtan) 200 mg tablet, Take 1 tablet (200 mg) by mouth 4 times a day. Take with carbidopa/levodopa, Disp: 360 tablet, Rfl: 3       Objective   Vitals:    25 1009 25 1010   BP: 128/81 133/70   BP Location: Left arm Left arm   Patient Position: Sitting Standing   BP Cuff Size: Adult Adult   Pulse: 71 72   Resp: 16    Weight: 62.6 kg (138 lb)                  Physical Exam    MDS UPDRS 1st Score: Motor Examination  Is the patient on medication for treating the symptoms of Parkinson's Disease?: Yes  Patients receiving medication for treating the symptoms of Parkinson's Disease, chely the patient's clinical state.: Off  Is the patient on Levodopa?: Yes  Minutes since last Levodopa dose: 4.5hrs  Speech: 2  Facial Expression: 3  Rigidty Neck: 0  Rigidty RUE: 2  Rigidity - LUE: 2  Rigidity RLE: 0  Rigidity LLE: 0  Finger Tapping Right Hand: 0  Finger Tapping Left Hand: 1  Hand Movements- Right Hand: 1  Hand Movements- Left Hand: 1  Pronatiaon-Supination Movments - Right Hand: 1  Pronatiaon-Supination Movments Left Hand: 2  Toe Tapping Right Foot: 1  Toe Tapping - Left Foot: 3  Leg Agility - Right Le  Leg Agility - Left le  Arising from Chair: 2  Gait: 2  Freezing of Gait: 0  Postural Stability: 3  Posture: 3  Global Spontanteity of Movment ( Body Bradykinesia):  2  Postural Tremor - Right Hand: 0  Postural Tremor - Left hand: 0  Kinetic Tremor - Right hand: 1  Kinetic Tremor - Left hand: 1  Rest Tremor Amplitude - RUE: 2  Rest Tremor Amplitude - LUE: 0  Rest Tremor Amplitude - RLE: 0  Rest Tremor Amplitude - LLE: 0  Rest Tremor Amplitude - Lip/Jaw: 2  Constancy of Rest Tremor: 4 (mostly jaw)  MDS UPDRS Total Score: 44  Were dyskinesias (chorea or dystonia) present during examination?: No        DBS Procedure:  [employer]  [id number]  Impedance: all OK    Initial Settings:  Group A- 3/26/25  L STN  C+ 1- 2.1/60/130 ((0-2.5))  R STN  C+ 9- 1.0/60/130 ((0-1.6))    Programming:  L STN electrode identifier   off meds has gamma signal  0 and 1 are very similar  Segments 1 a,b,c are similar     R STN electrode identifier   off meds has gamma signal  8 and 9 best  9a and C best, 10a and b close    Adaptive DBS/live stream ran on settings below, adjusting amplitude and thresholds as needed for response    Group C was completely suppressing signal but had dyskinesia despite lowering lower limit---adapt next visit, lower meds?    C+1a-2a- PW 60/125  2.5 RUE tremor resolves, RLE dyskinesia started then stopped, 2.5 he looked good, jaw tremor better at 3.0, no SE at 4.5 but did not go higher since at home he has dyskinesia   During adaptive setup at 2.0 had RLE dyskinesia, even present at 1.2    C+1- (group A), dyskinesia resolved    Adaptive group--thresholds 250/360  C+1- 1.9/60/125 ((2.1-1.7 range))    2a-1a- PW 20/125 and dyskinesia still at 2.8        Future programming:  For RLE dyskinesia  Lower PW  1a only  2-3-  0- and 3- also improved tremor  0-1-  1-2-  1ab2ab  Interleaving  9&10 segments        Final Settings:  Adaptive dual 25  C+1- 1.7/60/125 ((2.1-1.7 range))  R STN  C+ 9- 1.0/60/130 ((0-1.6))    Adaptive single 25  C+1- 1.7/60/125 ((2.1-1.7 range))  R STN  C+ 9- 1.0/60/130 ((0-1.6))    Group C-- 25  L STN   C+1a-2a- 1.5/1.5 (4.5)  R STN  C+9-10-  "1.0 ((0-1.5))    Group A- 3/26/25  L STN  C+ 1- 2.0/ ((0-2.5))  R STN  C+ 9- 0.8 ((0-1.4))    ----------------Previous Programming-----------------------------  3/26/25  [employer]   L STN 1&2 best signals and A segment  R STN shows gamma (dyskinesia( and signals not as good, possibly 8,10b&C seg slightly higher    Initial monopolar review:  L STN ---when RUE tremor resolves bradykinesia also improved, BLE leg tremors start to come out as meds wear off  0- 3.0 (no tremor but is intermittent) and SE R face tightness--worse at 3.5  1- 1.5 tremor improved (mild action), 2.0 rigidity improved (1+), 2.5 tremor and rigidity resolve, SE 4.0 \"weird\"   2-2.0, rigidity resolved, 2.5 action tremor resolves, rest tremor appeared to resolve---3.5 resting tremor breaks through and is constant (appeared to be good before this), SE 5.0 feels weird   3- 1.0 tremor resolves??, 5.0 action tremor resolves, rest tremor did not break through    1a- 2.5 tremor improved (1+intermittent, action still 1+),   1b- 4.0 rigidity resolves but tremor still often, no SE at 5.0  1c- 3.0 SE pain in the back of head     R STN--bradykinesia and rigidity   8- 1.5 rigidity resolved, SE 3.0 facial pulling  9- 3.0 seems worse but not much improvement before, SE 3.0 weird in LUE  10- no SE or improvement at 5.0  11- no SE or improvement at 5.0    Tried the following:  C+ 1- 2.0/ ((0-2.5))  C+ 8- 0.8/---feels weird and better when off so changed contact to 9- at 1.0 and still feels weird, out of control, like he needs medicine (but took 30min ago) and turned DBS and walked and still felt this way      Final Settings  L STN  C+ 1- 2.0/60/130 ((0-2.5))  R STN  C+ 9- 0.8/60/130 ((0-1.4))    25  Initial Settings:  Group A  L STN  C+ 1- 2.0/60/130 ((0-2.5))  R STN  C+ 9- 0.8/60/130 ((0-1.4))    Programmina-4.5 tremor improves, 6.0 tremor marked improvement (breaks through sometimes), bradykinesia improves (almost resolved), " "rigidity resolved--brain signals continue to improve  2b- 3.0 tremor improves mildly, SE dyskinesia   2c- no improvement and dyskinesia at 6.0    1a-2a- 3.0 marked improvement in tremor , SE at 6.0 feeling weird     9-10- 1.0 bradykinesia improves, SE 1.5 he felt dizzy    Final Settings:  Group C-- 4/28/25  L STN   C+1a-2a- 1.5/1.5 (4.5)  R STN  C+9-10- 1.0 ((0-1.5))    Group A- 3/26/25  Group A  L STN  C+ 1- 2.0/60/130 ((0-2.5))  R STN  C+ 9- 0.8/60/130 ((0-1.4))      Assessment/Plan   Mr. Hayden Hester is a 70 y.o. M with Parkinson's disease (sx 2016, dx 2018) here for 3rd DBS programming s/p bilateral STN DBS with Medtronic system 2/27/2025 by Dr. Fry.      Parkinson's: stable  Marked worsening of tremor with DBS off  Still w/ nighttime wearing off, add back CR at nighttime dose instead of IR  RLE dyskinesia and tremor when meds wear offis  Has been able to lower meds-stopped entacapone, lowered Sinemet from 250mg to 200mg/dose  DBS adjusted for this today, adaptive DBS set up as well as baseline group  Balance (main issue), FOG is slightly better  Discussed unlikely to respond to DBS  Caution lowering meds too much if this worsens  LSVT PT ordered  Enlarged Ventricles on MRI  Not likely NPH as denies urinary IC and denies cognitive issues and assessed for this today  He and wife wondering since balance is worsening but could be d/t worsening dyskinesia  Continue to monitor sx    Diagnoses and all orders for this visit:  Parkinson's disease with dyskinesia and fluctuating manifestations      #Physical therapy as planned    #Continue Carbidopa-levodopa 25/100mg 2 tabs 6 times a day.     If doing well can go down to 5 times a day.     #Controlled release Carbidopa-levodopa 25/100mg 2 tabs at 10pm ((INSTEAD of the immediate release))    #You are going home  on group Dual adapt 6/2/25    Can always go back to group \"baseline\" and manually adjust as needed     You cannot adjust the adapt groups--they adjust on their " own    If side effects just on one side, then OK to lower that side a click (0.1v) every few mins until resolves.    You have the ability to increase the amplitude on right or left side as needed for tremor, stiffness, slowness of movement.    You can increase by 0.1V every few days for one side of your body as needed. Increasing one side at a time allows you to notice if side effect with that increase BEFORE you adjust the other side.   You can also decrease if any side effects at any time (dyskinesia or other).  Decrease as quickly as you need to at any time.     You can TURN OFF the DBS at any time if any concern for side effect/worsening symptoms.        Total time of 110 minutes for today's visit, of which 90 mins were spent with DBS programming as noted above-checking settings, stimulator events, energy level, and impedances and updating settings in the chart.  MADDISON AGUILERA, personally performed  a medically appropriate exam, discussion of care/treatment options for remaining time.    Young Mckeon NP-C  Adult/Gerontological Nurse Practitioner   [facility], Department of Neurology  [facility] [place]  [facility]  [address] [place] [address]  Phone: [phone]  Fax: [phone]

## 2025-06-06 ENCOUNTER — TREATMENT (OUTPATIENT)
Dept: PHYSICAL THERAPY | Facility: CLINIC | Age: 70
End: 2025-06-06
Payer: MEDICARE

## 2025-06-06 DIAGNOSIS — R26.9 GAIT ABNORMALITY: ICD-10-CM

## 2025-06-06 DIAGNOSIS — R26.89 IMBALANCE: ICD-10-CM

## 2025-06-06 DIAGNOSIS — R26.81 UNSTEADINESS ON FEET: Primary | ICD-10-CM

## 2025-06-06 DIAGNOSIS — G20.B2 PARKINSON'S DISEASE WITH DYSKINESIA AND FLUCTUATING MANIFESTATIONS: ICD-10-CM

## 2025-06-06 PROCEDURE — 97112 NEUROMUSCULAR REEDUCATION: CPT | Mod: GP | Performed by: PHYSICAL THERAPIST

## 2025-06-06 PROCEDURE — 97530 THERAPEUTIC ACTIVITIES: CPT | Mod: GP | Performed by: PHYSICAL THERAPIST

## 2025-06-06 NOTE — PROGRESS NOTES
"Physical Therapy    Physical Therapy Treatment    Patient Name: Hayden Hester  MRN: [id number]  Today's Date: 6/12/2025  [employer]  Visit number: 2   Time Entry:   Time Calculation  Start Time: 1034  Stop Time: 1116  Time Calculation (min): 42 min     PT Therapeutic Procedures Time Entry  Neuromuscular Re-Education Time Entry: 28  Therapeutic Activity Time Entry: 14       Assessment:  Mr. Hester completed Mini Best test today scoring an 18/28 which is considered to be in fall risk category compared to normative values.  Pt. Did not respond to step counting as an external cue during functional mobility due to pt. With tendency to step with strides that were too long, which caused postural instability.  The patient responded positively to PT demo and VC for BIG steps during functional mobility.  Due to executive function deficits pt. Required increased time during visit, PT demonstration in the form of mirror in addition to verbal cues.  Pt. Spouse will need to supervise HEP in the future in order to promote proper technique. New goal established for FGA today.     Plan: establish HEP       Current Problem  1. Unsteadiness on feet        2. Parkinson's disease with dyskinesia and fluctuating manifestations  Follow Up In Physical Therapy      3. Imbalance        4. Gait abnormality                  Subjective    Pt. Reports that not much is new.  He has been trying to get back in shape.  He has had a lot of appointments.  They went to [facility] and they changed the DBS but it didn't work.  They did two different settings and said they could try the 2nd one.  No falls.  Pt. Spouse \"natali\" present during full visit    Precautions: mod fall risk, memory difficulty     Vital Signs: not taken     Pain: 0/10       Objective     Cognition: impaired      Outcome Measures:    Mini Best Test:  Total score=18/28  -1 for SLS, -1 for FT On foam, -1 for FA on incline, -2 for dual cog task, -1 for turn, -1 for obstacle, -2 for " "backwards stepping reactive balance, -1 for forward stepping reactive balance    SLS:  R=5 sec.  L=1 sec.     FGA: 21/30    Functional task recording form (0-7 scale)  Sit<>stands=7  Getting out of car=4  Crowded areas=5  Narrow spaces=4  Uneven surfaces=4  Getting off ground=7                   Treatments:  Neuro Re-ed:  -180 turns to the L and R \"cutting pie\" in 2 pieces with PT calling out \"1-2\" with SBA  -practice with turn negotiation from chair to mat table ambulating 15 ft. With step counting  The following exercises were completed at supervision level over 20ft. distance  Gait level surface:  Change in gait speed:  Gait with horizontal head turns:  Gait with vertical head turns:  Gait with pivot turn:  Step over obstacle:  Gait with narrow CLAUS:  Gait with eyes closed:  Ambulating backwards:  Stairs 3-6 inch stairs   Educated pt. On fall risk cut off score.   -SLS each side  -FA on incline  -FT EC on foam  -reactive balance multidirectional    There Act:  -functional mobility 30'x2 no device  -functional mobility 15'x2 with step counting  -educated pt/spouse on emphasizing BIG steps at home to promote inc. amplitude  -provided pt. With progressive biking program    OP EDUCATION:       Goals:  Active       PT Problem       PT Goal 1 STG:Pt. Will spouse will independent in supervising HEP in next 1-2 visits to promote self efficacy, manage symptoms and meet other LTG.        Start:  05/22/25    Expected End:  08/16/25            PT Goal 2: STG: Pt. Will improve TUG with <13.5 seconds with no device in order to meet fall risk cut off score       Start:  05/22/25    Expected End:  08/16/25            PT Goal 3: STG: Pt. Will improve 30 second sit to stand test by 1 repetitions to meet MCID for significant change compared to normative values and indicate improved functional strength.          Start:  05/22/25    Expected End:  08/16/25            PT Goal 4: STG: Pt. will complete FGA in next 1-2 visits       " "Start:  05/22/25    Expected End:  08/16/25            PT Goal 5 STG: pt. will complete 6MWT in next 1-2 visits       Start:  05/22/25    Expected End:  08/16/25            Patient Stated Goal 1 LTG: \"improve walking and balance; improve confidence, wants to be able to golf and go to subway, go to [employer]\"       Start:  05/22/25    Expected End:  08/16/25            PT Goal LTG 2: Pt. Will improve TUG by 3.4 seconds with LRAD in order to meet MCID and indicate improved transfer and gait efficiency.        Start:  05/22/25    Expected End:  08/16/25                PT Goal LTG 3: Pt. Will improve 30 second sit to stand test by 2 repetitions to meet MCID for significant change compared to normative values and indicate improved functional strength.          Start:  05/22/25    Expected End:  08/16/25                PT Goal LTG 4: Pt. Will improve FGA by 4 points in order to meet MCID for significant change compared to normative values and indicate improved dynamic balance.        Start:  06/12/25    Expected End:  08/16/25                  "

## 2025-06-11 NOTE — PATIENT COMMUNICATION
I spoke with patient to check on him.    He did not like either new aDBS groups.    He went back to old group and states he is doing well, does not feel the need to come in sooner for programming.  He will let me know if any issues.

## 2025-06-23 ENCOUNTER — APPOINTMENT (OUTPATIENT)
Dept: PRIMARY CARE | Facility: CLINIC | Age: 70
End: 2025-06-23
Payer: MEDICARE

## 2025-06-23 ENCOUNTER — TREATMENT (OUTPATIENT)
Dept: PHYSICAL THERAPY | Facility: CLINIC | Age: 70
End: 2025-06-23
Payer: MEDICARE

## 2025-06-23 DIAGNOSIS — G20.B2 PARKINSON'S DISEASE WITH DYSKINESIA AND FLUCTUATING MANIFESTATIONS: ICD-10-CM

## 2025-06-23 PROCEDURE — 97110 THERAPEUTIC EXERCISES: CPT | Mod: GP | Performed by: PHYSICAL THERAPIST

## 2025-06-23 PROCEDURE — 97112 NEUROMUSCULAR REEDUCATION: CPT | Mod: GP | Performed by: PHYSICAL THERAPIST

## 2025-06-23 NOTE — PROGRESS NOTES
"Physical Therapy    Physical Therapy Treatment    Patient Name: Hayden Hester  MRN: [id number]  Today's Date: 6/24/2025  [employer]  Visit number: 3   Time Entry:   Time Calculation  Start Time: 1451  Stop Time: 1530  Time Calculation (min): 39 min     PT Therapeutic Procedures Time Entry  Therapeutic Exercise Time Entry: 12  Neuromuscular Re-Education Time Entry: 27       Assessment:  Mr. Hester completed standing PWR! Exercises requiring PT demo and maximal verbal cues for proper technique. Pt. Had a tendency to lose balance posteriorly into mat table, suspect this is due to retropulsion combined with pt. Being distracted in the gym during exercises.  Educated pt/spouse on recommendation for these exercises to be completed in front of bed and with pt. Spouse mirroring exercises for proper technique.  Pt. Benefited from utilization of chair with unilateral UE support during standing PWR! Transition to improve postural stability.     Plan: establish HEP       Current Problem  1. Parkinson's disease with dyskinesia and fluctuating manifestations  Follow Up In Physical Therapy                  Subjective    Pt. Reports that he just took his medication so he is doing \"okay\". No falls.  Pt. Spouse \"Li\" present during full visit     Precautions: mod fall risk, memory difficulty     Vital Signs: not taken     Pain: 0/10       Objective     Cognition: impaired    Pt. Arrived to visit with no device           Target HR zone 65-70% of HR max=103-111 BPM  Treatments:    Neuro Re-ed:  -PWR! Standing:   Posture x10 with YTB   WS x10   Twist x10 each side   Transition x10 each side with unilateral UE support    There Ex:  -recumbent stepper 5'x1 level 4-L5 with VC to keep RPM's over 100 with BUE's and BLE's with focus on getting HR into target HR zone (HR=89-91 BPM)  -recumbent arsalan 5'x1 level 4 with VC to keep RPM's over 60 with BUE's and BLE's with focus on getting HR into target HR zone (HR=93 BPM)  -provided blank " "calendar to improve HEP adherence    OP EDUCATION:       Goals:  Active       PT Problem       PT Goal 1 STG:Pt. Will spouse will independent in supervising HEP in next 1-2 visits to promote self efficacy, manage symptoms and meet other LTG.        Start:  05/22/25    Expected End:  08/16/25            PT Goal 2: STG: Pt. Will improve TUG with <13.5 seconds with no device in order to meet fall risk cut off score       Start:  05/22/25    Expected End:  08/16/25            PT Goal 3: STG: Pt. Will improve 30 second sit to stand test by 1 repetitions to meet MCID for significant change compared to normative values and indicate improved functional strength.          Start:  05/22/25    Expected End:  08/16/25            PT Goal 4: STG: Pt. will complete FGA in next 1-2 visits       Start:  05/22/25    Expected End:  08/16/25            PT Goal 5 STG: pt. will complete 6MWT in next 1-2 visits       Start:  05/22/25    Expected End:  08/16/25            Patient Stated Goal 1 LTG: \"improve walking and balance; improve confidence, wants to be able to golf and go to subway, go to [employer]\"       Start:  05/22/25    Expected End:  08/16/25            PT Goal LTG 2: Pt. Will improve TUG by 3.4 seconds with LRAD in order to meet MCID and indicate improved transfer and gait efficiency.        Start:  05/22/25    Expected End:  08/16/25                PT Goal LTG 3: Pt. Will improve 30 second sit to stand test by 2 repetitions to meet MCID for significant change compared to normative values and indicate improved functional strength.          Start:  05/22/25    Expected End:  08/16/25                PT Goal LTG 4: Pt. Will improve FGA by 4 points in order to meet MCID for significant change compared to normative values and indicate improved dynamic balance.        Start:  06/12/25    Expected End:  08/16/25                    "

## 2025-06-26 ENCOUNTER — PROCEDURE VISIT (OUTPATIENT)
Dept: NEUROLOGY | Facility: HOSPITAL | Age: 70
End: 2025-06-26
Payer: MEDICARE

## 2025-06-26 VITALS
BODY MASS INDEX: 22.18 KG/M2 | RESPIRATION RATE: 18 BRPM | HEART RATE: 65 BPM | DIASTOLIC BLOOD PRESSURE: 80 MMHG | SYSTOLIC BLOOD PRESSURE: 139 MMHG | TEMPERATURE: 97.5 F | WEIGHT: 138 LBS | HEIGHT: 66 IN

## 2025-06-26 DIAGNOSIS — G20.B2 PARKINSON'S DISEASE WITH DYSKINESIA AND FLUCTUATING MANIFESTATIONS: Primary | ICD-10-CM

## 2025-06-26 DIAGNOSIS — R49.8 HYPOPHONIA: ICD-10-CM

## 2025-06-26 PROCEDURE — 95984 ALYS BRN NPGT PRGRMG ADDL 15: CPT | Performed by: NURSE PRACTITIONER

## 2025-06-26 PROCEDURE — 95983 ALYS BRN NPGT PRGRMG 15 MIN: CPT | Performed by: NURSE PRACTITIONER

## 2025-06-26 PROCEDURE — 99212 OFFICE O/P EST SF 10 MIN: CPT | Performed by: NURSE PRACTITIONER

## 2025-06-26 PROCEDURE — 99212 OFFICE O/P EST SF 10 MIN: CPT | Mod: 25 | Performed by: NURSE PRACTITIONER

## 2025-06-26 RX ORDER — CARBIDOPA AND LEVODOPA 70; 280 MG/1; MG/1
2 CAPSULE, EXTENDED RELEASE ORAL 3 TIMES DAILY
Qty: 180 EACH | Refills: 3 | Status: SHIPPED | OUTPATIENT
Start: 2025-06-26

## 2025-06-26 ASSESSMENT — UNIFIED PARKINSONS DISEASE RATING SCALE (UPDRS)
TOETAPPING_LEFT: 2
CLINICAL_STATE: ON
CHAIR_RISING_SCALE: 2
KINETIC_TREMOR_RIGHTHAND: 1
AMPLITUDE_RLE: 0
LEG_AGILITY_LEFT: 2
AMPLITUDE_LIP_JAW: 0
FREEZING_GAIT: 0
RIGIDITY_RUE: 2
KINETIC_TREMOR_LEFTHAND: 0
RIGIDITY_RLE: 0
GAIT: 2
DYSKINESIAS_PRESENT: NO
FINGER_TAPPING_LEFT: 1
POSTURAL_STABILITY: 0
LEVODOPA: YES
FINGER_TAPPING_RIGHT: 0
FACIAL_EXPRESSION: 2
POSTURAL_TREMOR_LEFTHAND: 0
RIGIDITY_LUE: 2
SPONTANEITY_OF_MOVEMENT: 2
TOTAL_SCORE: 30
HANDMOVEMENTS_RIGHT: 0
AMPLITUDE_LUE: 0
AMPLITUDE_RUE: 0
PRONATION_SUPINATION_RIGHT: 1
PRONATION_SUPINATION_LEFT: 2
AMPLITUDE_LLE: 0
LEG_AGILITY_RIGHT: 2
PARKINSONS_MEDS: YES
TOETAPPING_RIGHT: 1
POSTURAL_TREMOR_RIGHTHAND: 1
SPEECH: 2
RIGIDITY_NECK: 0
POSTURE: 2
CONSTANCY_TREMOR_ATREST: 0
RIGIDITY_LLE: 0

## 2025-06-26 ASSESSMENT — PAIN SCALES - GENERAL: PAINLEVEL_OUTOF10: 0-NO PAIN

## 2025-06-26 NOTE — PROGRESS NOTES
"Subjective     Hayden Hester is a 70 y.o. year old male who presents with Parkinson's Disease, here for follow up visit.    HPI  Last visit 4/28/25 with me:  #Change the LSVT PT to after apt with Dr. Fry who will officially clear them for PT  #Decrease by STOPPING Carbidopa-levodopa 25-250mg and starting Carbidopa-levodopa 25/100mg 2 tabs 6 times a day.   If doing well can go down to 5 times a day.   #You are going home on group 4/28/25 the only group with therapy to use.  Can always go back to group A 3/26/25    Here for 4th DBS programming s/p bilateral STN DBS with Medtronic system 2/27/2025 by Dr. Fry.     Comes with wife.    Tremor is well controlled but dyskinesia not better.   FOG is not an issue    Could not get up, could not walk on dual, was fine until after lunch and a nap and then could not function. Took 2.5hrs to recover to walk without assistance.   Single adaptive, did not feel good, took 1 hr to recover on the baseline and not feel wobbly.     When very stressed he \"shuts down totally\" and some issues at work and affected him.   Owner of the restaurant.     R sided dyskinesia is the issue.     Voice exercises.    Medicine has made him sleepy after meds and then kicks in and is fine  No OH sx  No constipation      MOTOR SYMPTOMS      +/-                            Comments  Motor sx overall  Unchanged overall   Tremor + Much improved  Eats with R hand but still some R tremor   Rigidity + Better than before but still there   Bradykinesia + Better than before but still there   Balance/gait + As above    FOG + improved   Falls -    PT + Once a week--2 more visits   Exercise - Little bit but not much  Wants to get back on stationary bike       Social  Lives with: wife  Help with ADLs:          [facility] Meds  Med Dose Time   Carbidopa-levodopa 25-100mg 2tabs 6 times a day  Q4hrs   [id number]-6pm-10pm- 4am dose times--5-6hrs in the night he can go without it        Carbidopa-levodopa CR " 25/100mg  2 tab at bedtime 10pm   Latency 30mins    Wearing off 4hrs  (Was 3-3.5hrs)    Side effects     Dyskinesia R sided uncontrolled    Hallucinations None    Other None      Prior medications:  Ropinirole - helped slightly in 2018, changed to levodopa in 2019  Entacapone 1 tab 4 times a day    Prior workup  MR brain w and wo IV contrast  Result Date: 10/24/2024  Surgical planning scan.   1. Disproportionate enlargement of the supratentorial ventricles compared to the adjacent sulci and mild enlargement of the 4th ventricle in a pattern that is suggestive of ventriculomegaly related to likely chronic ventricular enlargement possibly from prior infectious/inflammatory process. Alternatively, ventriculomegaly could also be related to central white matter volume loss. Sequela of normal pressure hydrocephalus is another consideration.   2. Mild signal abnormality located within the supratentorial periventricular white matter may be sequela of chronic small vessel ischemic changes with subtle transependymal flow not excluded but favored less likely.   3. No abnormal intracranial enhancement or mass.   This study was interpreted at [facility].   MACRO: None   Signed by: Valerio Bass 10/24/2024 8:00 AM Dictation workstation:   [id number]      Neuropsychology testing Dr. Godoy 9/12/24:  He appears to be a good candidate for DBS from the neuropsychological standpoint.   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     LRT 9/11/24:  He took 2 tabs Sinemet and was only 4.5hrs off meds  OFF-44, ON- 30, % change: 32%                   Current Outpatient Medications:     atorvastatin (Lipitor) 10 mg tablet, Take by mouth once daily. (Patient  "not taking: Reported on 2025), Disp: , Rfl:     carbidopa-levodopa (Crexont)  mg capsule,IR -extend rel,biphase, Take 2 capsules by mouth 3 times a day., Disp: 180 each, Rfl: 3    carbidopa-levodopa (Sinemet CR)  mg ER tablet, Take 1 tab at 10pm bedtime x 2 weeks if needed take 2 tabs at bedtime, Disp: 180 tablet, Rfl: 3    carbidopa-levodopa (Sinemet)  mg tablet, Take 2 tablets by mouth 6 times a day., Disp: 360 tablet, Rfl: 6       Objective   Vitals:    25 1413   BP: 139/80   Pulse: 65   Resp: 18   Temp: 36.4 °C (97.5 °F)   Weight: 62.6 kg (138 lb)   Height: 1.676 m (5' 6\")                 Physical Exam    MDS UPDRS 1st Score: Motor Examination  Is the patient on medication for treating the symptoms of Parkinson's Disease?: Yes  Patients receiving medication for treating the symptoms of Parkinson's Disease, chely the patient's clinical state.: On  Is the patient on Levodopa?: Yes  Minutes since last Levodopa dose: [id number]  Speech: 2  Facial Expression: 2  Rigidty Neck: 0  Rigidty RUE: 2  Rigidity - LUE: 2  Rigidity RLE: 0  Rigidity LLE: 0  Finger Tapping Right Hand: 0  Finger Tapping Left Hand: 1  Hand Movements- Right Hand: 0  Hand Movements- Left Hand: 1  Pronatiaon-Supination Movments - Right Hand: 1  Pronatiaon-Supination Movments Left Hand: 2  Toe Tapping Right Foot: 1  Toe Tapping - Left Foot: 2  Leg Agility - Right Le  Leg Agility - Left le  Arising from Chair: 2  Gait: 2  Freezing of Gait: 0  Postural Stability: 0 (not tested)  Posture: 2  Global Spontanteity of Movment ( Body Bradykinesia): 2  Postural Tremor - Right Hand: 1  Postural Tremor - Left hand: 0  Kinetic Tremor - Right hand: 1  Kinetic Tremor - Left hand: 0  Rest Tremor Amplitude - RUE: 0  Rest Tremor Amplitude - LUE: 0  Rest Tremor Amplitude - RLE: 0  Rest Tremor Amplitude - LLE: 0  Rest Tremor Amplitude - Lip/Jaw: 0  Constancy of Rest Tremor: 0  MDS UPDRS Total Score: 30  Were dyskinesias (chorea or " "dystonia) present during examination?: No        DBS Procedure:  [employer]  [id number]  Impedance: all OK    Initial Settings:  Baseline   L STN  C+ 1- 2.2/25 ((0-2.5))  R STN  C+ 9- 1.0/60/130 ((0-1.6))    Programming:    L STN  0+1- 2.8/70/165 mild breakthrough tremor, no dyskinesia but lower settings uncomfortable RLE/tight when walking and RUE dyskinesia when walking, 3.5/80/165    1-2+ 2.8/60/165 and mouth tremor and RUE tremor still 1+, PW 70 and amp 3.5 and tremor still present, at 4.0/70/160 dyskinesia were worse and still had breakthrough trmeor    1a-2a- PW 70 Freq 165 and amp 1.5/1.5. No dyskinesia or tremor     Lowered PW for dyskinesia and increased amp in baesline group since we keep coming back here    3.5//    Deleted adaptive dual and single adaptive groups    Wife notes  and April are pretty much the same      Future programming:  For RLE dyskinesia  Lower PW  1a only  2-3-  0- and 3- also improved tremor  0-1-  1-2-  1ab2ab  Interleaving  9&10 segments      Final Settings:  25  L STN  C+ 1- 3.5/30/130 ((0-4.0))  R STN  C+ 9- 0.8/60/130 ((0-1.4))    Group C-- 25  L STN   C+1a-2a- 1.5/1.5 (4.5)  R STN  C+9-10- 1.0 ((0-1.5))    Group A- 3/26/25--baseline  L STN  C+ 1- 2.0/60/130 ((0-2.5))  R STN  C+ 9- 0.8/60/130 ((0-1.4))    ----------------Previous Programming-----------------------------  3/26/25  [employer]   L STN 1&2 best signals and A segment  R STN shows gamma (dyskinesia( and signals not as good, possibly 8,10b&C seg slightly higher    Initial monopolar review:  L STN ---when RUE tremor resolves bradykinesia also improved, BLE leg tremors start to come out as meds wear off  0- 3.0 (no tremor but is intermittent) and SE R face tightness--worse at 3.5  1- 1.5 tremor improved (mild action), 2.0 rigidity improved (1+), 2.5 tremor and rigidity resolve, SE 4.0 \"weird\"   2-2.0, rigidity resolved, 2.5 action tremor resolves, rest tremor appeared to resolve---3.5 " resting tremor breaks through and is constant (appeared to be good before this), SE 5.0 feels weird   3- 1.0 tremor resolves??, 5.0 action tremor resolves, rest tremor did not break through    1a- 2.5 tremor improved (1+intermittent, action still 1+),   1b- 4.0 rigidity resolves but tremor still often, no SE at 5.0  1c- 3.0 SE pain in the back of head     R STN--bradykinesia and rigidity   8- 1.5 rigidity resolved, SE 3.0 facial pulling  9- 3.0 seems worse but not much improvement before, SE 3.0 weird in LUE  10- no SE or improvement at 5.0  11- no SE or improvement at 5.0    Tried the following:  C+ 1- 2.0/60/130 ((0-2.5))  C+ 8- 0.8/60/130---feels weird and better when off so changed contact to 9- at 1.0 and still feels weird, out of control, like he needs medicine (but took 30min ago) and turned DBS and walked and still felt this way      Final Settings  L STN  C+ 1- 2.0/60/130 ((0-2.5))  R STN  C+ 9- 0.8/60/130 ((0-1.4))    25  Initial Settings:  Group A  L STN  C+ 1- 2.0/60/130 ((0-2.5))  R STN  C+ 9- 0.8/60/130 ((0-1.4))    Programmina-4.5 tremor improves, 6.0 tremor marked improvement (breaks through sometimes), bradykinesia improves (almost resolved), rigidity resolved--brain signals continue to improve  2b- 3.0 tremor improves mildly, SE dyskinesia   2c- no improvement and dyskinesia at 6.0    1a-2a- 3.0 marked improvement in tremor , SE at 6.0 feeling weird     9-10- 1.0 bradykinesia improves, SE 1.5 he felt dizzy    Final Settings:  Group C-- 25  L STN   C+1a-2a- 1.5/1.5 (4.5)  R STN  C+9-10- 1.0 ((0-1.5))    Group A- 3/26/25  Group A  L STN  C+ 1- 2.0/60/130 ((0-2.5))  R STN  C+ 9- 0.8/60/130 ((0-1.4))    25  L STN electrode identifier   off meds has gamma signal  0 and 1 are very similar  Segments 1 a,b,c are similar     R STN electrode identifier   off meds has gamma signal  8 and 9 best  9a and C best, 10a and b close    Adaptive DBS/live stream ran on settings below, adjusting  amplitude and thresholds as needed for response    Group C was completely suppressing signal but had dyskinesia despite lowering lower limit---adapt next visit, lower meds?    C+1a-2a- PW 60/125  2.5 RUE tremor resolves, RLE dyskinesia started then stopped, 2.5 he looked good, jaw tremor better at 3.0, no SE at 4.5 but did not go higher since at home he has dyskinesia   During adaptive setup at 2.0 had RLE dyskinesia, even present at 1.2    C+1- (group A), dyskinesia resolved    Adaptive group--thresholds 250/360  C+1- 1.9/60/125 ((2.1-1.7 range))    2a-1a- PW 20/125 and dyskinesia still at 2.8        Final Settings:  Adaptive dual 6/2/25  C+1- 1.7/60/125 ((2.1-1.7 range))  R STN  C+ 9- 1.0/60/130 ((0-1.6))    Adaptive single 6/2/25  C+1- 1.7/60/125 ((2.1-1.7 range))  R STN  C+ 9- 1.0/60/130 ((0-1.6))    Group C-- 4/28/25  L STN   C+1a-2a- 1.5/1.5 (4.5)  R STN  C+9-10- 1.0 ((0-1.5))    Group A- 3/26/25--baseline  L STN  C+ 1- 2.0/60/130 ((0-2.5))  R STN  C+ 9- 0.8/60/130 ((0-1.4))      Assessment/Plan   Mr. Hayden Hester is a 70 y.o. M with Parkinson's disease (sx 2016, dx 2018) here for 3rd DBS programming s/p bilateral STN DBS with Medtronic system 2/27/2025 by Dr. Fry.      Parkinson's: stable. Has been able to lower meds-stopped entacapone, lowered Sinemet from 250mg to 200mg/dose but still on 6x/day dosing and though tremor well controlled has R sided dyskinesia.   New DBS group for dyskinesia of R side  Plan for more extensive programming next visit  Crexont 280mg, take 2 caps 3 times per day--about 6 hrs apart once DBS group tried  If dyskinesia worse 1 cap QID  If wearing off 3 caps QID  Did not try CR at hs 10pm dose instead of IR--can always try this if Crexont not covered or helpful  Balance (main issue), FOG also improved  LSVT PT ordered  Hypophonia: offered LSVT PT  Home exercises discussed and provided  Enlarged Ventricles on MRI  Not likely NPH as denies urinary IC and denies cognitive issues still  but wife would like us to keep this in mind which we discussed we will continue to monitor  Continue to monitor sx    Diagnoses and all orders for this visit:  Parkinson's disease with dyskinesia and fluctuating manifestations  -     carbidopa-levodopa (Crexont)  mg capsule,IR -extend rel,biphase; Take 2 capsules by mouth 3 times a day.  Hypophonia        #For low voice we discussed speech therapy, specifically LSVT ST where you seen ENT before    Let me know if you would like an order    Visit [email or web address] for tip and recommendations of voice and swallowing.    #Continue Physical therapy      I agree with [employer] bike 4 times a week at least 30mins per time getting heart rate elevated    #You are going home  on  baseline    Within a few days, OK to try new group 6/26/25    If side effects just on one side, then OK to lower that side a click (0.1v) every few mins until resolves.    You have the ability to increase the amplitude on right or left side as needed for tremor, stiffness, slowness of movement.    You can increase by 0.1V every few days for one side of your body as needed. Increasing one side at a time allows you to notice if side effect with that increase BEFORE you adjust the other side.   You can also decrease if any side effects at any time (dyskinesia or other).  Decrease as quickly as you need to at any time.     #After you try new DBS group x 1 week, can try new medication Crexont    #Continue Carbidopa-levodopa 25/100mg until Crexont is available, then stop Carbidopa-levodopa 25/100mg and start:    Crexont  280mg, take 2 caps 3 times per day--about 6 hrs apart    Dose may need adjusted.  If not enough Crexont (slow, tremors, stiff, worsening of balance/walking), let me know and we can increase  If too much Crexont (hallucinations, dyskinesia, sedation, dizziness), let me know and we can decrease it    Same side effects possible as with carbidopa-levodopa---Some side effects  you may experience include: sleepiness, nausea, constipation, dizziness, hallucinations, or involuntary wiggling movements. If you experience any side effects please call our clinic for further instructions.    IF you do not like this new med, you can always go back to previous med regimen.       #Follow up 2hr DBS on July 16th at 9am with me at [address]        Total time of 52 minutes for today's visit, of which 35 mins were spent with DBS programming as noted above-checking settings, stimulator events, energy level, and impedances and updating settings in the chart.  IMADDISON, personally performed  a medically appropriate exam, discussion of care/treatment options for remaining time.    Young Mckeon, NP-C  Adult/Gerontological Nurse Practitioner   [facility], Department of Neurology  [facility]  [address]  [place] [id number]  Phone: [phone]  Fax: [phone]

## 2025-06-26 NOTE — PATIENT INSTRUCTIONS
#For low voice we discussed speech therapy, specifically [facility] where you seen ENT before    Let me know if you would like an order    Visit [email or web address] for tip and recommendations of voice and swallowing.      #Continue Physical therapy      I agree with [employer] bike 4 times a week at least 30mins per time getting heart rate elevated    #You are going home  on  baseline    Within a few days, OK to try new group 6/26/25    If side effects just on one side, then OK to lower that side a click (0.1v) every few mins until resolves.    You have the ability to increase the amplitude on right or left side as needed for tremor, stiffness, slowness of movement.    You can increase by 0.1V every few days for one side of your body as needed. Increasing one side at a time allows you to notice if side effect with that increase BEFORE you adjust the other side.   You can also decrease if any side effects at any time (dyskinesia or other).  Decrease as quickly as you need to at any time.     #After you try new DBS group x 1 week, can try new medication Crexont    #Continue Carbidopa-levodopa 25/100mg until Crexont is available, then stop Carbidopa-levodopa 25/100mg and start:    Crexont  280mg, take 2 caps 3 times per day--about 6 hrs apart    Dose may need adjusted.  If not enough Crexont (slow, tremors, stiff, worsening of balance/walking), let me know and we can increase  If too much Crexont (hallucinations, dyskinesia, sedation, dizziness), let me know and we can decrease it    Same side effects possible as with carbidopa-levodopa---Some side effects you may experience include: sleepiness, nausea, constipation, dizziness, hallucinations, or involuntary wiggling movements. If you experience any side effects please call our clinic for further instructions.    IF you do not like this new med, you can always go back to previous med regimen.       #Follow up 2hr DBS on July 16th at 9am with me at [facility]  Estuardo Mckeon, NP-C  Adult/Gerontological Nurse Practitioner   [facility], Department of Neurology  [facility] [place]  [facility]  [address]  [place] [id number]  Phone: [phone]  Fax: [phone]

## 2025-06-30 ENCOUNTER — TREATMENT (OUTPATIENT)
Dept: PHYSICAL THERAPY | Facility: CLINIC | Age: 70
End: 2025-06-30
Payer: MEDICARE

## 2025-06-30 DIAGNOSIS — G20.B2 PARKINSON'S DISEASE WITH DYSKINESIA AND FLUCTUATING MANIFESTATIONS: ICD-10-CM

## 2025-06-30 DIAGNOSIS — R26.9 GAIT ABNORMALITY: ICD-10-CM

## 2025-06-30 DIAGNOSIS — R26.81 UNSTEADINESS ON FEET: ICD-10-CM

## 2025-06-30 DIAGNOSIS — R26.89 IMBALANCE: Primary | ICD-10-CM

## 2025-06-30 PROCEDURE — 97110 THERAPEUTIC EXERCISES: CPT | Mod: GP | Performed by: PHYSICAL THERAPIST

## 2025-06-30 PROCEDURE — 97112 NEUROMUSCULAR REEDUCATION: CPT | Mod: GP | Performed by: PHYSICAL THERAPIST

## 2025-06-30 NOTE — PROGRESS NOTES
"Physical Therapy    Physical Therapy Treatment    Patient Name: Hayden Hester  MRN: [id number]  Today's Date: 6/30/2025  [employer] Medicare-auth required   Primary dx=unsteadiness  Visit number: 4   Time Entry:   Time Calculation  Start Time: 1350  Stop Time: 1430  Time Calculation (min): 40 min     PT Therapeutic Procedures Time Entry  Therapeutic Exercise Time Entry: 8  Neuromuscular Re-Education Time Entry: 32       Assessment:  Mr. Hester demonstrated significantly improved balance and accuracy of technique during standing PWR! Exercises today.  Pt. Completed floor to standing transfer requiring CGA. Pt. Required utilization of upper extremity support on chair/mat table to perform floor to standing transfer to accommodate for strength/balance deficits.      Plan: review HEP, add strengthening, quadruped PWR!, add intervals to biking       Current Problem  1. Imbalance        2. Parkinson's disease with dyskinesia and fluctuating manifestations  Follow Up In Physical Therapy      3. Gait abnormality        4. Unsteadiness on feet                    Subjective      Pt. Reports that he is doing okay. No falls. Pt. Spouse reports that the patient is having a hard time getting in and out of bed depending on medication time.  Pt. Spouse \"Li\" present for full visit.    Precautions: mod fall risk, memory difficulty     Vital Signs: not taken     Pain: 0/10       Objective     Cognition: impaired    Pt. Arrived to visit with no device           Target HR zone 65-70% of HR max=103-111 BPM  Treatments:    Neuro Re-ed:  -PWR! Standing performed with PT demo throughout to promote proper technique:   Posture x10 with YTB   WS x10 to sticky note   Twist x10 each side   Transition x10 each side with no UE support, sticky note target  -floor to standing transfer with BUE support from chair with CGA (PT stabilizing chair)  -floor to standing transfer with unilateral UE support from mat table with CGA    There Ex:  -recumbent arsalan 6'x1 " "level 4 with VC to keep RPM's over 70 with BUE's and BLE's with focus on getting HR into target HR zone (HR max=99 and average BPM=95 BPM, performed with preferred music)  -functional mobility 50'x2 with supervision assist (PT counting out 1-8 for BIGGER steps)    OP EDUCATION:  HEP:  -standing PWR!  -sit<>stands  -daily biking       Goals:  Active       PT Problem       PT Goal 1 STG:Pt. Will spouse will independent in supervising HEP in next 1-2 visits to promote self efficacy, manage symptoms and meet other LTG.        Start:  05/22/25    Expected End:  08/16/25            PT Goal 2: STG: Pt. Will improve TUG with <13.5 seconds with no device in order to meet fall risk cut off score       Start:  05/22/25    Expected End:  08/16/25            PT Goal 3: STG: Pt. Will improve 30 second sit to stand test by 1 repetitions to meet MCID for significant change compared to normative values and indicate improved functional strength.          Start:  05/22/25    Expected End:  08/16/25            PT Goal 4: STG: Pt. will complete FGA in next 1-2 visits       Start:  05/22/25    Expected End:  08/16/25            PT Goal 5 STG: pt. will complete 6MWT in next 1-2 visits       Start:  05/22/25    Expected End:  08/16/25            Patient Stated Goal 1 LTG: \"improve walking and balance; improve confidence, wants to be able to golf and go to subway, go to [employer]\"       Start:  05/22/25    Expected End:  08/16/25            PT Goal LTG 2: Pt. Will improve TUG by 3.4 seconds with LRAD in order to meet MCID and indicate improved transfer and gait efficiency.        Start:  05/22/25    Expected End:  08/16/25                PT Goal LTG 3: Pt. Will improve 30 second sit to stand test by 2 repetitions to meet MCID for significant change compared to normative values and indicate improved functional strength.          Start:  05/22/25    Expected End:  08/16/25                PT Goal LTG 4: Pt. Will improve FGA by 4 points in order " to meet MCID for significant change compared to normative values and indicate improved dynamic balance.        Start:  06/12/25    Expected End:  08/16/25

## 2025-07-10 ENCOUNTER — TREATMENT (OUTPATIENT)
Dept: PHYSICAL THERAPY | Facility: CLINIC | Age: 70
End: 2025-07-10
Payer: MEDICARE

## 2025-07-10 DIAGNOSIS — R26.89 IMBALANCE: ICD-10-CM

## 2025-07-10 DIAGNOSIS — R26.9 GAIT ABNORMALITY: Primary | ICD-10-CM

## 2025-07-10 DIAGNOSIS — G20.B2 PARKINSON'S DISEASE WITH DYSKINESIA AND FLUCTUATING MANIFESTATIONS: ICD-10-CM

## 2025-07-10 DIAGNOSIS — R26.81 UNSTEADINESS ON FEET: ICD-10-CM

## 2025-07-10 PROCEDURE — 97530 THERAPEUTIC ACTIVITIES: CPT | Mod: GP | Performed by: PHYSICAL THERAPIST

## 2025-07-10 PROCEDURE — 97110 THERAPEUTIC EXERCISES: CPT | Mod: GP | Performed by: PHYSICAL THERAPIST

## 2025-07-10 PROCEDURE — 97112 NEUROMUSCULAR REEDUCATION: CPT | Mod: GP | Performed by: PHYSICAL THERAPIST

## 2025-07-10 NOTE — PROGRESS NOTES
Physical Therapy    Physical Therapy Treatment/Progress check    Patient Name: Hayden Hester  MRN: [id number]  Today's Date: 7/10/2025  [employer] Medicare-auth required   Primary dx=unsteadiness  Visit number: 5   Time Entry:   Time Calculation  Start Time: 1200  Stop Time: 1300  Time Calculation (min): 60 min     PT Therapeutic Procedures Time Entry  Therapeutic Exercise Time Entry: 10  Neuromuscular Re-Education Time Entry: 35  Therapeutic Activity Time Entry: 15       Assessment:  Progress check completed today to assess pt. Progress toward long term goals and determine need for continued physical therapy services .  Mr. Hester has improved his functional mobility speed as evidenced in the TUG functional outcome measure and is now scoring below fall risk category on this test. The patient has met 4/5 of his short term goals and is making progress towards his long term goals. The patient did not improve his sit to stand speed, however improving speed may not be appropriate for this patient due to executive function deficits and focus in therapy on quality and safety of movements vs. Speed. The patient did reduce the amount of assistance required for sit to stands on 30 second sit to stand test progressing from minimal assist to SBA today.  Mr. Hester has strong family support from his spouse and will benefit from continued physical therapy services to address remaining balance and motor planning deficits and reduce fall risk in his hoem and the community.     Plan: review HEP, add strengthening, quadruped PWR!, add intervals to biking       Current Problem  1. Gait abnormality        2. Parkinson's disease with dyskinesia and fluctuating manifestations  Follow Up In Physical Therapy      3. Imbalance        4. Unsteadiness on feet                    Subjective      Pt. Reports that he is having a bad day, he is kind of out of it.  He just took his medication around 12:03. Exercises are going okay at home. No falls.  He is  "doing the bike every other day.  He pulled his leg muscle trying to get out of the bike the other day, on his left side.      Precautions: mod fall risk, memory difficulty     Vital Signs: not taken     Pain: 0/10       Objective     Cognition: impaired    Pt. Arrived to visit with no device    Outcome Measures:  TUG=11.8 seconds with no device with SBA, VC to turn fully to chair to sit down  IE=14.5 seconds with no device with shuffling steps, difficulty with turn at SBA    30 second sit<>Stand test=  7/10/25=7 reps with SBA, no LOB posteriorly   IE=9 reps with BUE support with min-A, retropulsion with LOB posteriorly for all reps     6MWT: 1039 ft. With no device with close SBA              Target HR zone 65-70% of HR max=103-111 BPM  Treatments:    Neuro Re-ed:  -PWR! Seated    Posture x5 with 5\" hold   Twist with boxing to target  -PWR! Standing With close SBA performed in front of mat table for safety   Posture x10 with boxing gloves with crossing over midline low and powering up into upright posture to stand   WX x10 to each side with upper cut   WS x10 to sticky note    There Ex:  -recumbent arsalan bike with Bue's and BLE's 6'x1 level 4 with VC to keep RPM's over 60 with BUE's and BLE's with focus on getting HR into target HR zone (HR max=99 and average BPM=95 BPM, performed with preferred music)  -functional mobility 50'x2 with supervision assist (PT counting out 1-8 for BIGGER steps)    There act:  -sit<>stand x7 with BUE support  -functional mobility 1039 with SBA no device    OP EDUCATION:  HEP:  -standing PWR!  -sit<>stands  -daily biking       Goals:  Active       PT Problem       PT Goal 1 STG:Pt. Will spouse will independent in supervising HEP in next 1-2 visits to promote self efficacy, manage symptoms and meet other LTG.  (Met)       Start:  05/22/25    Expected End:  08/16/25    Resolved:  07/10/25         PT Goal 2: STG: Pt. Will improve TUG with <13.5 seconds with no device in order to meet fall " "risk cut off score (Met)       Start:  05/22/25    Expected End:  08/16/25    Resolved:  07/10/25      Goal Note         TUG=11.8 seconds with no device with SBA, VC to turn fully to chair to sit down  IE=14.5 seconds with no device with shuffling steps, difficulty with turn at SBA              PT Goal 3: STG: Pt. Will improve 30 second sit to stand test by 1 repetitions to meet MCID for significant change compared to normative values and indicate improved functional strength.    (Progressing)       Start:  05/22/25    Expected End:  08/16/25         Goal Note       7/10/25=7 reps with SBA, no LOB posteriorly   IE=9 reps with BUE support with min-A, retropulsion with LOB posteriorly for all reps              PT Goal 4: STG: Pt. will complete FGA in next 1-2 visits (Met)       Start:  05/22/25    Expected End:  08/16/25    Resolved:  07/10/25         PT Goal 5 STG: pt. will complete 6MWT in next 1-2 visits (Met)       Start:  05/22/25    Expected End:  08/16/25    Resolved:  07/10/25         Patient Stated Goal 1 LTG: \"improve walking and balance; improve confidence, wants to be able to golf and go to subway, go to [employer]\" (Progressing)       Start:  05/22/25    Expected End:  08/16/25            PT Goal LTG 2: Pt. Will improve TUG by 3.4 seconds with LRAD in order to meet MCID and indicate improved transfer and gait efficiency.  (Progressing)       Start:  05/22/25    Expected End:  08/16/25                PT Goal LTG 3: Pt. Will improve 30 second sit to stand test by 2 repetitions to meet MCID for significant change compared to normative values and indicate improved functional strength.    (Progressing)       Start:  05/22/25    Expected End:  08/16/25                PT Goal LTG 4: Pt. Will improve FGA by 4 points in order to meet MCID for significant change compared to normative values and indicate improved dynamic balance.  (Progressing)       Start:  06/12/25    Expected End:  08/16/25              "       Insurance Authorization Information  Date of Evaluation: 25    Onset Date: 2025    Referring Physician: Young Mckeon     Surgery in the Last 3 months:  no    CPT Codes: Therapeutic Exercise: 78412, Therapeutic Activity: 40608, Neuromuscular Re-Education: 30355, Gait Trainin, and Self-Care/Home Management Trainin    Diagnosis:   Problem List Items Addressed This Visit           ICD-10-CM       Neuro    Parkinson's disease with dyskinesia and fluctuating manifestations G20.B2       Symptoms and Signs    Unsteadiness on feet R26.81    Gait abnormality - Primary R26.9    Imbalance R26.89          Functional Outcome:     TUG, FGA, 30 second sit<>stand test, Mini Best test, 6MWT            OT / PT Evaluation complexity:  high    Which of the following best describes the primary reason of therapy: Improving, restoring, or adapting functional mobility or skills    Visits Requested: 8    Date Range: 90 days    Select all conditions that apply: Cognitive Impairment     Re-evaluation only:  Short term goals Met: 4/5    Long term goals Met: 0/4    Naila Muñiz, PT

## 2025-07-16 ENCOUNTER — APPOINTMENT (OUTPATIENT)
Dept: NEUROLOGY | Facility: CLINIC | Age: 70
End: 2025-07-16
Payer: MEDICARE

## 2025-07-21 ENCOUNTER — TREATMENT (OUTPATIENT)
Dept: PHYSICAL THERAPY | Facility: CLINIC | Age: 70
End: 2025-07-21
Payer: MEDICARE

## 2025-07-21 DIAGNOSIS — R26.89 IMBALANCE: ICD-10-CM

## 2025-07-21 DIAGNOSIS — G20.B2 PARKINSON'S DISEASE WITH DYSKINESIA AND FLUCTUATING MANIFESTATIONS: ICD-10-CM

## 2025-07-21 DIAGNOSIS — R26.9 GAIT ABNORMALITY: Primary | ICD-10-CM

## 2025-07-21 DIAGNOSIS — R26.81 UNSTEADINESS ON FEET: ICD-10-CM

## 2025-07-21 PROCEDURE — 97110 THERAPEUTIC EXERCISES: CPT | Mod: GP | Performed by: PHYSICAL THERAPIST

## 2025-07-21 PROCEDURE — 97112 NEUROMUSCULAR REEDUCATION: CPT | Mod: GP | Performed by: PHYSICAL THERAPIST

## 2025-07-21 NOTE — PROGRESS NOTES
"Physical Therapy    Physical Therapy Treatment    Patient Name: Hayden Hester  MRN: [id number]  Today's Date: 7/21/2025  [employer] Medicare-auth required   Primary dx=unsteadiness  Visit number: 6   Time Entry:   Time Calculation  Start Time: 1120  Stop Time: 1200  Time Calculation (min): 40 min     PT Therapeutic Procedures Time Entry  Therapeutic Exercise Time Entry: 14  Neuromuscular Re-Education Time Entry: 36       Assessment:  Mr. Hester continues to demonstrate excellent engagement, motivation and effort levels with addition of boxing implemented into balance exercises. Pt. Presents with moderate to severe retropulsion, therefore goal of exercises is to promote anterior weight shift.  The patient required maximal verbal cues initially for proper foot placement during obstacle course, however with practice the patient improved.  The patient required maximal verbal cues for proper hand and foot placement during stair negotiation and will require more cues in the future.     Plan: review HEP, add strengthening, quadruped PWR!, add intervals to biking       Current Problem  1. Gait abnormality        2. Parkinson's disease with dyskinesia and fluctuating manifestations  Follow Up In Physical Therapy      3. Imbalance        4. Unsteadiness on feet                  Subjective      Pt. Reports that he walked around the neighborhood with his wife this morning she he feels warmed up.  He practiced some boxing at home with his wife. Pt. Spouse \"Li\" present during full visit    Precautions: mod fall risk, memory difficulty     Vital Signs: not taken     Pain: 0/10       Objective     Cognition: impaired    Pt. Arrived to visit with no device requiring supervision assist          Target HR zone 65-70% of HR max=103-111 BPM  Treatments:    Neuro Re-ed:  -sit<>stands with resistance posteriorly x5 (unable to complete)  -sit<>Stands with cone  on and off ground naming different sports x10  -stair negotiation with step-to " "pattern to ascend/descend x4 laps with CGA, hand over hand cues for proper UE placement on railing and max VC for proper foot placement on steps    -boxing sequence 1-6 hitting boxing targets with SBA, mat table behind patient  -boxing sequence random numbers hitting boxing targets with SBA, mat table behind patient (LOB x1 posteriorly into mat table)  -obstacle course with airex, x3 hurdles and 4 inch step x4 laps with CGA->Min-A    There Ex:  -recumbent arsalan bike with Bue's and BLE's 10'x1 level 4 with VC to keep RPM's over 60 with BLE's only with VC to \"sprint\" every other minute to encourage increased amplitude (performed to promote neuroplasticity, increase LE strength)  -functional mobility from lobby to gym 50'x1 with supervision assist (PT counting out 1-8 for BIGGER steps)  -functional mobility from lobby to pt. Spouse's vehicle indoors 50'x1 and outdoors on uneven pavement and declined textured ramp 40'x1 with supervision assist (PT providing VC and demo for bigger steps)  OP EDUCATION:  HEP:  -standing PWR!  -sit<>stands  -daily biking       Goals:  Active       PT Problem       PT Goal 1 STG:Pt. Will spouse will independent in supervising HEP in next 1-2 visits to promote self efficacy, manage symptoms and meet other LTG.  (Met)       Start:  05/22/25    Expected End:  08/16/25    Resolved:  07/10/25         PT Goal 2: STG: Pt. Will improve TUG with <13.5 seconds with no device in order to meet fall risk cut off score (Met)       Start:  05/22/25    Expected End:  08/16/25    Resolved:  07/10/25      Goal Note         TUG=11.8 seconds with no device with SBA, VC to turn fully to chair to sit down  IE=14.5 seconds with no device with shuffling steps, difficulty with turn at SBA              PT Goal 3: STG: Pt. Will improve 30 second sit to stand test by 1 repetitions to meet MCID for significant change compared to normative values and indicate improved functional strength.    (Progressing)       Start:  " "05/22/25    Expected End:  08/16/25         Goal Note       7/10/25=7 reps with SBA, no LOB posteriorly   IE=9 reps with BUE support with min-A, retropulsion with LOB posteriorly for all reps              PT Goal 4: STG: Pt. will complete FGA in next 1-2 visits (Met)       Start:  05/22/25    Expected End:  08/16/25    Resolved:  07/10/25         PT Goal 5 STG: pt. will complete 6MWT in next 1-2 visits (Met)       Start:  05/22/25    Expected End:  08/16/25    Resolved:  07/10/25         Patient Stated Goal 1 LTG: \"improve walking and balance; improve confidence, wants to be able to golf and go to subway, go to [employer]\" (Progressing)       Start:  05/22/25    Expected End:  08/16/25            PT Goal LTG 2: Pt. Will improve TUG by 3.4 seconds with LRAD in order to meet MCID and indicate improved transfer and gait efficiency.  (Progressing)       Start:  05/22/25    Expected End:  08/16/25                PT Goal LTG 3: Pt. Will improve 30 second sit to stand test by 2 repetitions to meet MCID for significant change compared to normative values and indicate improved functional strength.    (Progressing)       Start:  05/22/25    Expected End:  08/16/25                PT Goal LTG 4: Pt. Will improve FGA by 4 points in order to meet MCID for significant change compared to normative values and indicate improved dynamic balance.  (Progressing)       Start:  06/12/25    Expected End:  08/16/25                         "

## 2025-07-28 ENCOUNTER — APPOINTMENT (OUTPATIENT)
Dept: PHYSICAL THERAPY | Facility: CLINIC | Age: 70
End: 2025-07-28
Payer: MEDICARE

## 2025-07-28 ENCOUNTER — APPOINTMENT (OUTPATIENT)
Dept: NEUROLOGY | Facility: CLINIC | Age: 70
End: 2025-07-28
Payer: MEDICARE

## 2025-07-28 ENCOUNTER — TELEPHONE (OUTPATIENT)
Dept: NEUROLOGY | Facility: CLINIC | Age: 70
End: 2025-07-28

## 2025-07-28 VITALS
HEART RATE: 74 BPM | RESPIRATION RATE: 16 BRPM | DIASTOLIC BLOOD PRESSURE: 74 MMHG | SYSTOLIC BLOOD PRESSURE: 116 MMHG | WEIGHT: 136 LBS | BODY MASS INDEX: 21.95 KG/M2

## 2025-07-28 DIAGNOSIS — G20.B2 PARKINSON'S DISEASE WITH DYSKINESIA AND FLUCTUATING MANIFESTATIONS: Primary | ICD-10-CM

## 2025-07-28 PROCEDURE — 95983 ALYS BRN NPGT PRGRMG 15 MIN: CPT | Performed by: NURSE PRACTITIONER

## 2025-07-28 PROCEDURE — 95984 ALYS BRN NPGT PRGRMG ADDL 15: CPT | Performed by: NURSE PRACTITIONER

## 2025-07-28 ASSESSMENT — UNIFIED PARKINSONS DISEASE RATING SCALE (UPDRS)
TOTAL_SCORE: 38
PRONATION_SUPINATION_RIGHT: 0
RIGIDITY_LUE: 2
TOETAPPING_RIGHT: 2
AMPLITUDE_LLE: 0
RIGIDITY_NECK: 2
AMPLITUDE_LIP_JAW: 0
PARKINSONS_MEDS: YES
LEVODOPA: YES
RIGIDITY_RLE: 1
AMPLITUDE_RUE: 0
TOETAPPING_LEFT: 3
AMPLITUDE_LUE: 0
KINETIC_TREMOR_RIGHTHAND: 0
CHAIR_RISING_SCALE: 1
KINETIC_TREMOR_LEFTHAND: 0
CONSTANCY_TREMOR_ATREST: 0
DYSKINESIAS_PRESENT: NO
FINGER_TAPPING_RIGHT: 1
LEG_AGILITY_RIGHT: 1
SPONTANEITY_OF_MOVEMENT: 2
POSTURAL_TREMOR_LEFTHAND: 0
CLINICAL_STATE: ON
RIGIDITY_RUE: 2
PRONATION_SUPINATION_LEFT: 1
POSTURAL_STABILITY: 3
RIGIDITY_LLE: 1
GAIT: 2
POSTURAL_TREMOR_RIGHTHAND: 0
AMPLITUDE_RLE: 0
HANDMOVEMENTS_RIGHT: 1
FINGER_TAPPING_LEFT: 2
SPEECH: 2
LEG_AGILITY_LEFT: 3
FREEZING_GAIT: 0
POSTURE: 3
FACIAL_EXPRESSION: 2

## 2025-07-28 ASSESSMENT — ENCOUNTER SYMPTOMS
OCCASIONAL FEELINGS OF UNSTEADINESS: 0
DEPRESSION: 0
LOSS OF SENSATION IN FEET: 0

## 2025-07-28 NOTE — TELEPHONE ENCOUNTER
Approved  Prior Authorization Portal   Prior authorization approved  Payer: [employer] [place] and [employer] - Medicare Case ID: [id number]  Note from payer: [place] Case: [id number], Status: Approved, Coverage Starts on: 4/28/2025 12:00:00 AM, Coverage Ends on: 7/28/2026 12:00:00 AM.  Approval Details    Authorized from April 28, 2025 to July 28, 2026

## 2025-07-28 NOTE — PATIENT INSTRUCTIONS
#For low voice we discussed speech therapy, specifically [facility] where you seen ENT before    Let me know if you would like an order--consider after physical therapy     Visit [email or web address] for tip and recommendations of voice and swallowing.    #Continue Physical therapy      I agree with [employer] bike 4 times a week at least 30mins per time getting heart rate elevated    #You are going home  on  baseline    Tomorrow OK to try new group 7/28/25    If side effects just on one side, then OK to lower that side a click (0.1v) every few mins until resolves.    You have the ability to increase the amplitude on right or left side as needed for tremor, stiffness, slowness of movement.    You can increase by 0.1V every few days for one side of your body as needed. Increasing one side at a time allows you to notice if side effect with that increase BEFORE you adjust the other side.   You can also decrease if any side effects at any time (dyskinesia or other).  Decrease as quickly as you need to at any time.     #After you are stable on DBS settings, can try Crexont     #Continue Carbidopa-levodopa 25/100mg until Crexont is available, then stop Carbidopa-levodopa 25/100mg and start:    Crexont  280mg, take 2 caps 3 times per day--about 6 hrs apart    Dose may need adjusted.  If not enough Crexont (slow, tremors, stiff, worsening of balance/walking), let me know and we can increase  If too much Crexont (hallucinations, dyskinesia, sedation, dizziness), let me know and we can decrease it    Same side effects possible as with carbidopa-levodopa---Some side effects you may experience include: sleepiness, nausea, constipation, dizziness, hallucinations, or involuntary wiggling movements. If you experience any side effects please call our clinic for further instructions.    IF you do not like this new med, you can always go back to previous med regimen.     #Follow up with me on Sept 3rd when Dr. Saenz is here.      Young Mckeon, NP-C  Adult/Gerontological Nurse Practitioner   [facility], Department of Neurology  [facility] [place]  [facility]  [address] [id number]  Phone: [phone]  Fax: [phone]

## 2025-07-28 NOTE — PROGRESS NOTES
"Subjective     Hayden Hester is a 70 y.o. year old male who presents with Parkinson's Disease, here for follow up visit.    HPI  Last visit 6/26/25 with me:  -new DBS group  -[facility]    Here for 5th DBS programming s/p bilateral STN DBS with Medtronic system 2/27/2025 by Dr. Fry.     Comes with wife.    New program he overall did not feel good--they think there was more tremor, slower?    Current setting he has tremor on R side w/ wearing off and and jaw, RLE dyskinesia. And in the evening \"he bottoms out\" during the day.    No falls  Doing PT  FOG infrequent   No hallucinations  Sleeping well at night, feels well rested. Sometimes snores. No sleep study.     10 days on antibiotic for dental implant.       Social  Lives with: wife  Help with ADLs:          [facility] Meds  Med Dose Time   Carbidopa-levodopa 25-100mg 2tabs 6 times a day  Q4hrs   [id number]-10pm- 4am dose times--5-6hrs in the night he can go without it        Carbidopa-levodopa CR 25/100mg  2 tab at bedtime 10pm   Latency 30mins    Wearing off 4hrs  (Was 3-3.5hrs)    Side effects     Dyskinesia R sided uncontrolled    Hallucinations None    Other None      Prior medications:  Ropinirole - helped slightly in 2018, changed to levodopa in 2019  Entacapone 1 tab 4 times a day    Prior workup  MR brain w and wo IV contrast  Result Date: 10/24/2024  Surgical planning scan.   1. Disproportionate enlargement of the supratentorial ventricles compared to the adjacent sulci and mild enlargement of the 4th ventricle in a pattern that is suggestive of ventriculomegaly related to likely chronic ventricular enlargement possibly from prior infectious/inflammatory process. Alternatively, ventriculomegaly could also be related to central white matter volume loss. Sequela of normal pressure hydrocephalus is another consideration.   2. Mild signal abnormality located within the supratentorial periventricular white matter may be sequela of chronic " small vessel ischemic changes with subtle transependymal flow not excluded but favored less likely.   3. No abnormal intracranial enhancement or mass.   This study was interpreted at [facility].   MACRO: None   Signed by: Valerio Bass 10/24/2024 8:00 AM Dictation workstation:   [id number]      Neuropsychology testing Dr. Godoy 9/12/24:  He appears to be a good candidate for DBS from the neuropsychological standpoint.   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     LRT 9/11/24:  He took 2 tabs Sinemet and was only 4.5hrs off meds  OFF-44, ON- 30, % change: 32%      Patient Health Questionnaire-2 Score: 0            Current Outpatient Medications:     atorvastatin (Lipitor) 10 mg tablet, Take by mouth once daily. (Patient not taking: Reported on 2/28/2025), Disp: , Rfl:     carbidopa-levodopa (Crexont)  mg capsule,IR -extend rel,biphase, Take 2 capsules by mouth 3 times a day., Disp: 180 each, Rfl: 3    carbidopa-levodopa (Sinemet CR)  mg ER tablet, Take 1 tab at 10pm bedtime x 2 weeks if needed take 2 tabs at bedtime, Disp: 180 tablet, Rfl: 3    carbidopa-levodopa (Sinemet)  mg tablet, Take 2 tablets by mouth 6 times a day., Disp: 360 tablet, Rfl: 6       Objective   Vitals:    07/28/25 0809 07/28/25 0810   BP: 106/66 116/74   BP Location: Left arm Left arm   Patient Position: Sitting Standing   BP Cuff Size: Adult Adult   Pulse: 65 74   Resp: 16    Weight: 61.7 kg (136 lb)                    Physical Exam    MDS UPDRS 1st Score: Motor Examination  Is the patient on medication for treating the symptoms of Parkinson's Disease?: Yes  Patients receiving medication for treating the symptoms of Parkinson's Disease,  "chely the patient's clinical state.: On  Is the patient on Levodopa?: Yes  Minutes since last Levodopa dose: 2hrs 17 mins  Speech: 2  Facial Expression: 2  Rigidty Neck: 2  Rigidty RUE: 2  Rigidity - LUE: 2  Rigidity RLE: 1  Rigidity LLE: 1  Finger Tapping Right Hand: 1  Finger Tapping Left Hand: 2  Hand Movements- Right Hand: 1  Hand Movements- Left Hand: 1  Pronatiaon-Supination Movments - Right Hand: 0  Pronatiaon-Supination Movments Left Hand: 1  Toe Tapping Right Foot: 2  Toe Tapping - Left Foot: 3  Leg Agility - Right Le  Leg Agility - Left leg: 3  Arising from Chair: 1  Gait: 2  Freezing of Gait: 0  Postural Stability: 3  Posture: 3  Global Spontanteity of Movment ( Body Bradykinesia): 2  Postural Tremor - Right Hand: 0  Postural Tremor - Left hand: 0  Kinetic Tremor - Right hand: 0  Kinetic Tremor - Left hand: 0  Rest Tremor Amplitude - RUE: 0  Rest Tremor Amplitude - LUE: 0  Rest Tremor Amplitude - RLE: 0  Rest Tremor Amplitude - LLE: 0  Rest Tremor Amplitude - Lip/Jaw: 0  Constancy of Rest Tremor: 0  MDS UPDRS Total Score: 38  Were dyskinesias (chorea or dystonia) present during examination?: No        DBS Procedure:  [facility] 25  [id number]  Impedance: all OK    Initial Settings:  Baseline   L STN  C+ 1- 2.2//25 ((0-2.5))  R STN  C+ 9- 1.0/60/130 ((0-1.6))    Programming:  He is 2.5hrs on meds  Off DBS RUE is constant 2+, jaw tremor constant 2+, R arm dystonia behind him when walking  RLE dyskinesia only seen during programming/DBS on    L STN  1a- 4.5 tremor improves (displayed), looks great at 9 without dyskinesia or tremor even with distraction---no SE at 12.0    0-1- 2.0/2.0 tremor not better and feels \"weird\" that is pretty bad    1-2-3- 1.6/2.0/1.0--looked great but had dyskinesia in RLE and lowered to 1.4/1.3/1.5 and tremor returned  Retry with lower PW and or higher freq and lower amp    2-3- 2.0/1.3 tremor improved but \"weird\" in the head    0- 3.0 \"weird\" though tremor " "improved   3- 1.5 RLE dyskinesia and tremor much better    1-3- 2.2/ 1.0 RLE dyskinesia worsen     1-2- 1.6/2.5 RLE dyskinesia though tremor is better     1ab- 0.8/0.5  2ab- 0.8/0.5 and tremor gone but dyskinsia RLE (mild mouth tremor)    Interleaving  3- 1.0  1- 1.8  RLE dyskinesia, did not feel right    0+1-3.5/60/160--PW 70 felt \"weird\", 2.5 RUE tremor resolves but jar tremor still there. \"Better\" at 4.0 but still does not feel it is as good as \"baseline\" group, 5.0 jaw tremor resolves but     Interleaving   0- 1.8---higher caused SE headache  1- 2.0---RLE dyskinesia and jaw and RUE tremor still present  Lowered PW 40/40 and still RLE dyskinesia     0+1-2+ tremor still present in  jaw at 5.0/70/180    1-3+ 2.5/60/130--tremor resolved but RLE dyskinesia    25--deleted since did not feel great with lower PW  L STN  C+ 1- 3.5/30/130 ((0-4.0))  R STN  C+ 9- 0.8/60/130 ((0-1.4))    Group C-- 25----deleted  L STN   C+1a-2a- 1.5/1.5 (4.5)  R STN  C+9-10- 1.0 ((0-1.5))    Future programmin-2-3- 1.6/2.0/1.0---Retry with lower PW and or higher freq and lower amp  Bipolar interleaving, wide bipolar  9&10 segments    Final Settings:  Group A- 3/26/25--baseline--active  L STN  C+ 1- 2.0/60/130 ((0-2.5))  R STN  C+ 9- 0.8/60/130 ((0-1.4))    25  L STN  C+1a- 4.5/60/130 ((0-7.0))  R STN  C+ 9- 0.8/60/130 ((0-1.4))      ----------------Previous Programming-----------------------------  3/26/25  [employer]   L STN 1&2 best signals and A segment  R STN shows gamma (dyskinesia( and signals not as good, possibly 8,10b&C seg slightly higher    Initial monopolar review:  L STN ---when RUE tremor resolves bradykinesia also improved, BLE leg tremors start to come out as meds wear off  0- 3.0 (no tremor but is intermittent) and SE R face tightness--worse at 3.5  1- 1.5 tremor improved (mild action), 2.0 rigidity improved (1+), 2.5 tremor and rigidity resolve, SE 4.0 \"weird\"   2-2.0, rigidity resolved, 2.5 action " tremor resolves, rest tremor appeared to resolve---3.5 resting tremor breaks through and is constant (appeared to be good before this), SE 5.0 feels weird   3- 1.0 tremor resolves??, 5.0 action tremor resolves, rest tremor did not break through    1a- 2.5 tremor improved (1+intermittent, action still 1+),   1b- 4.0 rigidity resolves but tremor still often, no SE at 5.0  1c- 3.0 SE pain in the back of head     R STN--bradykinesia and rigidity   8- 1.5 rigidity resolved, SE 3.0 facial pulling  9- 3.0 seems worse but not much improvement before, SE 3.0 weird in LUE  10- no SE or improvement at 5.0  11- no SE or improvement at 5.0    Tried the following:  C+ 1- 2.0/60/130 ((0-2.5))  C+ 8- 0.8/60/130---feels weird and better when off so changed contact to 9- at 1.0 and still feels weird, out of control, like he needs medicine (but took 30min ago) and turned DBS and walked and still felt this way      Final Settings  L STN  C+ 1- 2.0/60/130 ((0-2.5))  R STN  C+ 9- 0.8/60/130 ((0-1.4))    25  Initial Settings:  Group A  L STN  C+ 1- 2.0/60/130 ((0-2.5))  R STN  C+ 9- 0.8/60/130 ((0-1.4))    Programmina-4.5 tremor improves, 6.0 tremor marked improvement (breaks through sometimes), bradykinesia improves (almost resolved), rigidity resolved--brain signals continue to improve  2b- 3.0 tremor improves mildly, SE dyskinesia   2c- no improvement and dyskinesia at 6.0    1a-2a- 3.0 marked improvement in tremor , SE at 6.0 feeling weird     9-10- 1.0 bradykinesia improves, SE 1.5 he felt dizzy    Final Settings:  Group C-- 25  L STN   C+1a-2a- 1.5/1.5 (4.5)  R STN  C+9-10- 1.0 ((0-1.5))    Group A- 3/26/25  Group A  L STN  C+ 1- 2.0/60/130 ((0-2.5))  R STN  C+ 9- 0.8/60/130 ((0-1.4))    25  L STN electrode identifier   off meds has gamma signal  0 and 1 are very similar  Segments 1 a,b,c are similar     R STN electrode identifier   off meds has gamma signal  8 and 9 best  9a and C best, 10a and b  close    Adaptive DBS/live stream ran on settings below, adjusting amplitude and thresholds as needed for response    Group C was completely suppressing signal but had dyskinesia despite lowering lower limit---adapt next visit, lower meds?    C+1a-2a- PW 60/125  2.5 RUE tremor resolves, RLE dyskinesia started then stopped, 2.5 he looked good, jaw tremor better at 3.0, no SE at 4.5 but did not go higher since at home he has dyskinesia   During adaptive setup at 2.0 had RLE dyskinesia, even present at 1.2    C+1- (group A), dyskinesia resolved    Adaptive group--thresholds 250/360  C+1- 1.9/60/125 ((2.1-1.7 range))    2a-1a- PW 20/125 and dyskinesia still at 2.8        Final Settings:  Adaptive dual 6/2/25  C+1- 1.7/60/125 ((2.1-1.7 range))  R STN  C+ 9- 1.0/60/130 ((0-1.6))    Adaptive single 6/2/25  C+1- 1.7/60/125 ((2.1-1.7 range))  R STN  C+ 9- 1.0/60/130 ((0-1.6))    Group C-- 4/28/25  L STN   C+1a-2a- 1.5/1.5 (4.5)  R STN  C+9-10- 1.0 ((0-1.5))    Group A- 3/26/25--baseline  L STN  C+ 1- 2.0/60/130 ((0-2.5))  R STN  C+ 9- 0.8/60/130 ((0-1.4))    6/26/25    Initial Settings:  Baseline   L STN  C+ 1- 2.2/60/25 ((0-2.5))  R STN  C+ 9- 1.0/60/130 ((0-1.6))    Programming:    L STN  0+1- 2.8/70/165 mild breakthrough tremor, no dyskinesia but lower settings uncomfortable RLE/tight when walking and RUE dyskinesia when walking, 3.5/80/165    1-2+ 2.8/60/165 and mouth tremor and RUE tremor still 1+, PW 70 and amp 3.5 and tremor still present, at 4.0/70/160 dyskinesia were worse and still had breakthrough trmeor    1a-2a- PW 70 Freq 165 and amp 1.5/1.5. No dyskinesia or tremor     Lowered PW for dyskinesia and increased amp in baesline group since we keep coming back here    3.5/30/125    Deleted adaptive dual and single adaptive groups    Wife notes baseline and April are pretty much the same    Final Settings:  6/25/25  L STN  C+ 1- 3.5/30/130 ((0-4.0))  R STN  C+ 9- 0.8/60/130 ((0-1.4))    Group C-- 4/28/25  L STN    C+1a-2a- 1.5/1.5 (4.5)  R STN  C+9-10- 1.0 ((0-1.5))    Group A- 3/26/25--baseline  L STN  C+ 1- 2.0/60/130 ((0-2.5))  R STN  C+ 9- 0.8/60/130 ((0-1.4))        Assessment/Plan   Mr. Hayden Hester is a 70 y.o. M with Parkinson's disease (sx 2016, dx 2018) here for 3rd DBS programming s/p bilateral STN DBS with Medtronic system 2/27/2025 by Dr. Fry.      Parkinson's: stable. Has been able to lower meds-stopped entacapone, lowered Sinemet from 250mg to 200mg/dose but still on 6x/day dosing and though tremor well controlled has R sided dyskinesia.  New DBS group for dyskinesia of R side  Continue trouble shooting as R sided sx limited by SE   DBS programming L side for notable bradykinesia/rigidity--segments next   Crexont 280mg, take 2 caps 3 times per day--about 6 hrs apart once DBS group tried  Nurse Camejo did PA today and approved, need to see cause  If dyskinesia worse 1 cap QID  If wearing off 3 caps QID  Did not try CR at hs 10pm dose instead of IR--can always try this if Crexont not covered or helpful  Balance (main issue w/ +postural instability, +camptocormia), FOG also improved but still there  LSVT PT ordered  Hypophonia: offered LSVT PT  Should do once LSVT PT is completed   Home exercises provided  Enlarged Ventricles on MRI  Not likely NPH as denies urinary IC and denies cognitive issues still but wife would like us to keep this in mind which we discussed we will continue to monitor  Continue to monitor sx    Diagnoses and all orders for this visit:  Parkinson's disease with dyskinesia and fluctuating manifestations    #For low voice we discussed speech therapy, specifically LSVT ST where you seen ENT before    Let me know if you would like an order--consider after physical therapy     Visit [email or web address] for tip and recommendations of voice and swallowing.    #Continue Physical therapy      I agree with stationery bike 4 times a week at least 30mins per time getting heart rate  elevated    #You are going home  on  baseline    Tomorrow OK to try new group 7/28/25    If side effects just on one side, then OK to lower that side a click (0.1v) every few mins until resolves.    You have the ability to increase the amplitude on right or left side as needed for tremor, stiffness, slowness of movement.    You can increase by 0.1V every few days for one side of your body as needed. Increasing one side at a time allows you to notice if side effect with that increase BEFORE you adjust the other side.   You can also decrease if any side effects at any time (dyskinesia or other).  Decrease as quickly as you need to at any time.     #After you are stable on DBS settings, can try Crexont     #Continue Carbidopa-levodopa 25/100mg until Crexont is available, then stop Carbidopa-levodopa 25/100mg and start:    Crexont  280mg, take 2 caps 3 times per day--about 6 hrs apart    Dose may need adjusted.  If not enough Crexont (slow, tremors, stiff, worsening of balance/walking), let me know and we can increase  If too much Crexont (hallucinations, dyskinesia, sedation, dizziness), let me know and we can decrease it    Same side effects possible as with carbidopa-levodopa---Some side effects you may experience include: sleepiness, nausea, constipation, dizziness, hallucinations, or involuntary wiggling movements. If you experience any side effects please call our clinic for further instructions.    IF you do not like this new med, you can always go back to previous med regimen.     #Follow up with me on Sept 3rd when Dr. Saenz is here.         Total time of 110 minutes for today's visit, of which 100 mins were spent with DBS programming as noted above-checking settings, stimulator events, energy level, and impedances and updating settings in the chart.    Young Mckeon, MADDISON-C  Adult/Gerontological Nurse Practitioner   [facility], Department of Neurology  [facility]  [facility]  [address] [place] [address]  Phone: [phone]  Fax: [phone]

## 2025-07-31 ENCOUNTER — TREATMENT (OUTPATIENT)
Dept: PHYSICAL THERAPY | Facility: CLINIC | Age: 70
End: 2025-07-31
Payer: MEDICARE

## 2025-07-31 DIAGNOSIS — G20.B2 PARKINSON'S DISEASE WITH DYSKINESIA AND FLUCTUATING MANIFESTATIONS: ICD-10-CM

## 2025-07-31 DIAGNOSIS — R26.9 GAIT ABNORMALITY: ICD-10-CM

## 2025-07-31 DIAGNOSIS — R26.89 IMBALANCE: Primary | ICD-10-CM

## 2025-07-31 PROCEDURE — 97110 THERAPEUTIC EXERCISES: CPT | Mod: GP | Performed by: PHYSICAL THERAPIST

## 2025-07-31 PROCEDURE — 97112 NEUROMUSCULAR REEDUCATION: CPT | Mod: GP | Performed by: PHYSICAL THERAPIST

## 2025-07-31 PROCEDURE — 97530 THERAPEUTIC ACTIVITIES: CPT | Mod: GP | Performed by: PHYSICAL THERAPIST

## 2025-07-31 NOTE — PROGRESS NOTES
"Physical Therapy    Physical Therapy Treatment    Patient Name: Hayden Hester  MRN: [id number]  Today's Date: 7/31/2025  [employer]-auth required   Primary dx=unsteadiness  Visit number: 7   Time Entry:   Time Calculation  Start Time: 0939  Stop Time: 1024  Time Calculation (min): 45 min     PT Therapeutic Procedures Time Entry  Therapeutic Exercise Time Entry: 10  Neuromuscular Re-Education Time Entry: 15  Therapeutic Activity Time Entry: 20       Assessment:  Mr. Hester demonstrated increased retropulsion during today's visit with tendency to lose balance posteriorly into mat table.  Pt. With retropulsion in sitting as well requiring verbal cues for anterior WS and upright posture to avoid LOB posteriorly.  PT. Was challenged with sit<>Stands from mat table initially, however with practice and verbal cues to reach one arm forward the patient improved.      Plan: review HEP, add strengthening, quadruped PWR!, golfing       Current Problem  1. Imbalance        2. Parkinson's disease with dyskinesia and fluctuating manifestations  Follow Up In Physical Therapy      3. Gait abnormality                    Subjective      Pt. Reports that he has been practicing some boxing at home.  No pain, no falls. Pt. Spouse \"Li\" present during full visit.    Precautions: mod fall risk, memory difficulty     Vital Signs: not taken     Pain: 0/10       Objective     Cognition: impaired    Pt. Arrived to visit with no device requiring supervision assist          Target HR zone 65-70% of HR max=103-111 BPM  Treatments:    Neuro Re-ed:  -sit<>Stands with no UE support with crossover boxing jab to target x10 reps with close SBA  -boxing jab forward with a step x10 on each side with SBA in front of mat table with multiple LOB backwards into mat table   -ducking under bhumi stick for reactive balance and focus on anterior WS x10 reps with close SBA mat table behdin pt.    There Ex:  -recumbent arsalan bike with Bue's and BLE's 10'x1 level " "4 with VC to keep RPM's over 60 with BLE's only with VC to \"sprint\" every other minute to encourage increased amplitude (performed to promote neuroplasticity, increase LE strength)    There Act:  Transfer practice from chair<>mat table x10 reps with PT counting 1-2-3-4 and reaching back with SBA (VC for scooting to the edge 25% of the time)  -sit<>Stand practice with turns to 3 chairs in a row x7 repetitions with SBA with VC for BIG steps     OP EDUCATION:  HEP:  -standing PWR!  -sit<>stands  -daily biking       Goals:  Active       PT Problem       PT Goal 1 STG:Pt. Will spouse will independent in supervising HEP in next 1-2 visits to promote self efficacy, manage symptoms and meet other LTG.  (Met)       Start:  05/22/25    Expected End:  08/16/25    Resolved:  07/10/25         PT Goal 2: STG: Pt. Will improve TUG with <13.5 seconds with no device in order to meet fall risk cut off score (Met)       Start:  05/22/25    Expected End:  08/16/25    Resolved:  07/10/25      Goal Note         TUG=11.8 seconds with no device with SBA, VC to turn fully to chair to sit down  IE=14.5 seconds with no device with shuffling steps, difficulty with turn at SBA              PT Goal 3: STG: Pt. Will improve 30 second sit to stand test by 1 repetitions to meet MCID for significant change compared to normative values and indicate improved functional strength.    (Progressing)       Start:  05/22/25    Expected End:  08/16/25         Goal Note       7/10/25=7 reps with SBA, no LOB posteriorly   IE=9 reps with BUE support with min-A, retropulsion with LOB posteriorly for all reps              PT Goal 4: STG: Pt. will complete FGA in next 1-2 visits (Met)       Start:  05/22/25    Expected End:  08/16/25    Resolved:  07/10/25         PT Goal 5 STG: pt. will complete 6MWT in next 1-2 visits (Met)       Start:  05/22/25    Expected End:  08/16/25    Resolved:  07/10/25         Patient Stated Goal 1 LTG: \"improve walking and balance; " "improve confidence, wants to be able to golf and go to subway, go to [place]\" (Progressing)       Start:  05/22/25    Expected End:  08/16/25            PT Goal LTG 2: Pt. Will improve TUG by 3.4 seconds with LRAD in order to meet MCID and indicate improved transfer and gait efficiency.  (Progressing)       Start:  05/22/25    Expected End:  08/16/25                PT Goal LTG 3: Pt. Will improve 30 second sit to stand test by 2 repetitions to meet MCID for significant change compared to normative values and indicate improved functional strength.    (Progressing)       Start:  05/22/25    Expected End:  08/16/25                PT Goal LTG 4: Pt. Will improve FGA by 4 points in order to meet MCID for significant change compared to normative values and indicate improved dynamic balance.  (Progressing)       Start:  06/12/25    Expected End:  08/16/25                           "

## 2025-08-01 ENCOUNTER — APPOINTMENT (OUTPATIENT)
Dept: PHYSICAL THERAPY | Facility: CLINIC | Age: 70
End: 2025-08-01
Payer: MEDICARE

## 2025-08-01 ENCOUNTER — TREATMENT (OUTPATIENT)
Dept: PHYSICAL THERAPY | Facility: CLINIC | Age: 70
End: 2025-08-01
Payer: MEDICARE

## 2025-08-01 DIAGNOSIS — R26.89 IMBALANCE: ICD-10-CM

## 2025-08-01 DIAGNOSIS — R26.81 UNSTEADINESS ON FEET: Primary | ICD-10-CM

## 2025-08-01 DIAGNOSIS — G20.B2 PARKINSON'S DISEASE WITH DYSKINESIA AND FLUCTUATING MANIFESTATIONS: ICD-10-CM

## 2025-08-01 DIAGNOSIS — R26.9 GAIT ABNORMALITY: ICD-10-CM

## 2025-08-01 PROCEDURE — 97112 NEUROMUSCULAR REEDUCATION: CPT | Mod: GP | Performed by: PHYSICAL THERAPIST

## 2025-08-01 PROCEDURE — 97110 THERAPEUTIC EXERCISES: CPT | Mod: GP | Performed by: PHYSICAL THERAPIST

## 2025-08-01 NOTE — PROGRESS NOTES
"Physical Therapy    Physical Therapy Treatment    Patient Name: Hayden Hester  MRN: [id number]  Today's Date: 8/1/2025  [place] Medicare-auth required   Primary dx=unsteadiness  Visit number: 8   Time Entry:   Time Calculation  Start Time: 1447  Stop Time: 1530  Time Calculation (min): 43 min     PT Therapeutic Procedures Time Entry  Therapeutic Exercise Time Entry: 9  Neuromuscular Re-Education Time Entry: 33       Assessment:  Mr. Hester demonstrated excellent response to completing sit to stands with weight/resistance that promoted anterior weight shift. Following these exercises the patient demonstrated reduced retropulsion with sit to stands.  Pt. Completed golfing outdoors with CGA throughout.  Pt. Was highly engaged and demonstrated good anterior weight shift.  Pt. Required verbal cues for proper motor planning and safety when picking items off of the ground, including getting close to object and keeping wider CLAUS.     Plan: review HEP, add strengthening, quadruped PWR!, golfing       Current Problem  1. Unsteadiness on feet        2. Parkinson's disease with dyskinesia and fluctuating manifestations  Follow Up In Physical Therapy      3. Imbalance        4. Gait abnormality                  Subjective      Pt. Reports that he has been practicing some boxing at home.  No pain, no falls. Pt. Spouse \"Li\" present during full visit.    Precautions: mod fall risk, memory difficulty     Vital Signs: not taken     Pain: 0/10       Objective     Cognition: impaired    Pt. Arrived to visit with no device requiring supervision assist          Target HR zone 65-70% of HR max=103-111 BPM  Treatments:    Neuro Re-ed:  -sit<>Stands with no UE support with crossover boxing jab to target with GTB in BUE's x10 reps with close SBA  -sit<>Stands holding 5# weights x10 with VC for ant. WS  -golfing outdoors hitting x6 wiffle balls with metal club with CGA  -ambulating outdoors on uneven grass with VC For BIG steps, picking up x5 " "balls with VC to keep wide CLAUS and to center himself closer to ball to inc. safety  -sit<>stand from outdoor picnic table with SBA x2    There Ex:  -recumbent arsalan bike with Bue's and BLE's 10'x1 level 4 with VC to keep RPM's over 60 with BLE's only with VC to \"sprint\" every other minute to encourage increased amplitude (performed to promote neuroplasticity, increase LE strength)    There Act: 3'x1 (billed with there ex)  -lateral transfer into pt. Spouse's vehicle with SBA (VC to get closer to door before sitting in seat)  -functional mobility outdoors on uneven pavement and textured ramp 45'x1 with SBA with VC for inc. armswing    OP EDUCATION:  HEP:  -standing PWR!  -sit<>stands  -daily biking       Goals:  Active       PT Problem       PT Goal 1 STG:Pt. Will spouse will independent in supervising HEP in next 1-2 visits to promote self efficacy, manage symptoms and meet other LTG.  (Met)       Start:  05/22/25    Expected End:  08/16/25    Resolved:  07/10/25         PT Goal 2: STG: Pt. Will improve TUG with <13.5 seconds with no device in order to meet fall risk cut off score (Met)       Start:  05/22/25    Expected End:  08/16/25    Resolved:  07/10/25      Goal Note         TUG=11.8 seconds with no device with SBA, VC to turn fully to chair to sit down  IE=14.5 seconds with no device with shuffling steps, difficulty with turn at SBA              PT Goal 3: STG: Pt. Will improve 30 second sit to stand test by 1 repetitions to meet MCID for significant change compared to normative values and indicate improved functional strength.    (Progressing)       Start:  05/22/25    Expected End:  08/16/25         Goal Note       7/10/25=7 reps with SBA, no LOB posteriorly   IE=9 reps with BUE support with min-A, retropulsion with LOB posteriorly for all reps              PT Goal 4: STG: Pt. will complete FGA in next 1-2 visits (Met)       Start:  05/22/25    Expected End:  08/16/25    Resolved:  07/10/25         PT Goal 5 " "STG: pt. will complete 6MWT in next 1-2 visits (Met)       Start:  05/22/25    Expected End:  08/16/25    Resolved:  07/10/25         Patient Stated Goal 1 LTG: \"improve walking and balance; improve confidence, wants to be able to golf and go to subway, go to [employer]\" (Progressing)       Start:  05/22/25    Expected End:  08/16/25            PT Goal LTG 2: Pt. Will improve TUG by 3.4 seconds with LRAD in order to meet MCID and indicate improved transfer and gait efficiency.  (Progressing)       Start:  05/22/25    Expected End:  08/16/25                PT Goal LTG 3: Pt. Will improve 30 second sit to stand test by 2 repetitions to meet MCID for significant change compared to normative values and indicate improved functional strength.    (Progressing)       Start:  05/22/25    Expected End:  08/16/25                PT Goal LTG 4: Pt. Will improve FGA by 4 points in order to meet MCID for significant change compared to normative values and indicate improved dynamic balance.  (Progressing)       Start:  06/12/25    Expected End:  08/16/25                             "

## 2025-08-04 ENCOUNTER — TREATMENT (OUTPATIENT)
Dept: PHYSICAL THERAPY | Facility: CLINIC | Age: 70
End: 2025-08-04
Payer: MEDICARE

## 2025-08-04 DIAGNOSIS — G20.B2 PARKINSON'S DISEASE WITH DYSKINESIA AND FLUCTUATING MANIFESTATIONS: ICD-10-CM

## 2025-08-04 DIAGNOSIS — R26.89 IMBALANCE: Primary | ICD-10-CM

## 2025-08-04 DIAGNOSIS — R26.9 GAIT ABNORMALITY: ICD-10-CM

## 2025-08-04 PROCEDURE — 97110 THERAPEUTIC EXERCISES: CPT | Mod: GP | Performed by: PHYSICAL THERAPIST

## 2025-08-04 PROCEDURE — 97112 NEUROMUSCULAR REEDUCATION: CPT | Mod: GP | Performed by: PHYSICAL THERAPIST

## 2025-08-04 NOTE — PROGRESS NOTES
"Physical Therapy    Physical Therapy Treatment    Patient Name: Hayden Hester  MRN: [id number]  Today's Date: 8/4/2025  [employer] Medicare-auth required   Primary dx=unsteadiness  Visit number: 9   Time Entry:   Time Calculation  Start Time: 0934  Stop Time: 1020  Time Calculation (min): 46 min     PT Therapeutic Procedures Time Entry  Therapeutic Exercise Time Entry: 15  Neuromuscular Re-Education Time Entry: 31       Assessment:  Mr. Hester completed dynamic balance tasks outdoors with pt. Preferred activity to promote the principles of neuroplasticity. The patient was highly engaged and demonstrated excellent improvement in retropulsion with external focus of golfing with club and wiffle balls. The patient demonstrated improved anterior weight shift when picking up the wiffle balls off of the ground in the grass today, with significantly reduced cues required for safety compared to last visit.     Plan: review HEP, add strengthening, quadruped PWR!, golfing       Current Problem  1. Imbalance        2. Parkinson's disease with dyskinesia and fluctuating manifestations  Follow Up In Physical Therapy      3. Gait abnormality                  Subjective      Pt. Reports that nothing is new. Pt. Spouse \"Li\" present during full visit.  He was tired after last visit.   Precautions: mod fall risk, memory difficulty     Vital Signs: not taken     Pain: 0/10       Objective     Cognition: impaired    Pt. Arrived to visit with no device requiring supervision assist          Target HR zone 65-70% of HR max=103-111 BPM    Treatments:    Neuro Re-ed:  -golfing outdoors hitting wiffle balls towards hoola hoop as target with \"7\" iron ~75 ft. C7  And 100ft. X6 reps  -pt. Ambulated outdoors on grass with close SBA today 50'x2, 75'x1 (LOB x1 when turning with independent stepping strategy to recover)  -pt. Picked up wiffle balls x8 in hoola hoop and places them in basket   -pt. Picked up wiffle balls that were hit in the grass motor " "planning on how close to get to each ball and then placing them in basket x6 with close SBA    There Ex:  -recumbent arsalan bike with Bue's and BLE's 10'x1 level 4 with VC to keep RPM's over 60 with BLE's only with VC to \"sprint\" every other minute to encourage increased amplitude with pt. Preferred music to promote increased speed (performed to promote neuroplasticity, increase LE strength)    There Act: 3'x1 (billed with there ex)  -lateral transfer into pt. Spouse's vehicle with SBA (VC to reach up to handle on top of car vs. Hold onto car door x2 reps  -functional mobility outdoors on uneven pavement and textured ramp 45'x1 with SBA with VC for inc. armswing    OP EDUCATION:  HEP:  -standing PWR!  -sit<>stands  -daily biking       Goals:  Active       PT Problem       PT Goal 1 STG:Pt. Will spouse will independent in supervising HEP in next 1-2 visits to promote self efficacy, manage symptoms and meet other LTG.  (Met)       Start:  05/22/25    Expected End:  08/16/25    Resolved:  07/10/25         PT Goal 2: STG: Pt. Will improve TUG with <13.5 seconds with no device in order to meet fall risk cut off score (Met)       Start:  05/22/25    Expected End:  08/16/25    Resolved:  07/10/25      Goal Note         TUG=11.8 seconds with no device with SBA, VC to turn fully to chair to sit down  IE=14.5 seconds with no device with shuffling steps, difficulty with turn at SBA              PT Goal 3: STG: Pt. Will improve 30 second sit to stand test by 1 repetitions to meet MCID for significant change compared to normative values and indicate improved functional strength.    (Progressing)       Start:  05/22/25    Expected End:  08/16/25         Goal Note       7/10/25=7 reps with SBA, no LOB posteriorly   IE=9 reps with BUE support with min-A, retropulsion with LOB posteriorly for all reps              PT Goal 4: STG: Pt. will complete FGA in next 1-2 visits (Met)       Start:  05/22/25    Expected End:  08/16/25    " "Resolved:  07/10/25         PT Goal 5 STG: pt. will complete 6MWT in next 1-2 visits (Met)       Start:  05/22/25    Expected End:  08/16/25    Resolved:  07/10/25         Patient Stated Goal 1 LTG: \"improve walking and balance; improve confidence, wants to be able to golf and go to subway, go to [employer]\" (Progressing)       Start:  05/22/25    Expected End:  08/16/25            PT Goal LTG 2: Pt. Will improve TUG by 3.4 seconds with LRAD in order to meet MCID and indicate improved transfer and gait efficiency.  (Progressing)       Start:  05/22/25    Expected End:  08/16/25                PT Goal LTG 3: Pt. Will improve 30 second sit to stand test by 2 repetitions to meet MCID for significant change compared to normative values and indicate improved functional strength.    (Progressing)       Start:  05/22/25    Expected End:  08/16/25                PT Goal LTG 4: Pt. Will improve FGA by 4 points in order to meet MCID for significant change compared to normative values and indicate improved dynamic balance.  (Progressing)       Start:  06/12/25    Expected End:  08/16/25                               "

## 2025-08-07 ENCOUNTER — TREATMENT (OUTPATIENT)
Dept: PHYSICAL THERAPY | Facility: CLINIC | Age: 70
End: 2025-08-07
Payer: MEDICARE

## 2025-08-07 DIAGNOSIS — R26.81 UNSTEADINESS ON FEET: Primary | ICD-10-CM

## 2025-08-07 DIAGNOSIS — G20.B2 PARKINSON'S DISEASE WITH DYSKINESIA AND FLUCTUATING MANIFESTATIONS: ICD-10-CM

## 2025-08-07 DIAGNOSIS — R26.89 IMBALANCE: ICD-10-CM

## 2025-08-07 DIAGNOSIS — R26.9 GAIT ABNORMALITY: ICD-10-CM

## 2025-08-07 PROCEDURE — 97112 NEUROMUSCULAR REEDUCATION: CPT | Mod: GP | Performed by: PHYSICAL THERAPIST

## 2025-08-07 PROCEDURE — 97530 THERAPEUTIC ACTIVITIES: CPT | Mod: GP | Performed by: PHYSICAL THERAPIST

## 2025-08-07 NOTE — PROGRESS NOTES
"Physical Therapy    Physical Therapy Treatment/Progress Check    Patient Name: Hayden Hester  MRN: [id number]  Today's Date: 8/7/2025  [employer] Medicare-auth required   Primary dx=unsteadiness  Visit number: 10   Time Entry:   Time Calculation  Start Time: 0935  Stop Time: 1018  Time Calculation (min): 43 min     PT Therapeutic Procedures Time Entry  Neuromuscular Re-Education Time Entry: 28  Therapeutic Activity Time Entry: 15       Assessment:  Progress check completed today to assess pt. Progress towards long term goals and determine need for continued physical therapy services. Mr. Hester is demonstrating improved safety with functional transfers requiring less verbal cues for to turn around fully and reach back with both of his upper extremities.  The patient is also presenting with reduced retropulsion with his standing balance.  The patient's quality of his movement is improving which does not get captured in the speed goals that were created for his long term goals.  Many interventions have been focused on slowing down patient to improve his quality and safety vs. Speed.  The patient was coming off of his medication during today's evaluation, therefore the patient's speed and balance were impacted and testing might not have been equal to when he is \"on\" with medication.  The patient met his goal for improving his sit to stand efficiency with 30 second sit to stand test and progressed to completing sit to stands with unilateral UE support to promote anterior WS with no loss of balance posteriorly with this technique.  The patient is making progress towards his long term goals for TUG and FGA, however pt. Is requiring more time due to pt. With progressive neurological condition and history of memory deficits.  Mr. Hester will highly benefit from continued physical therapy services to reduce fall risk in his home and the community.        Plan: review HEP, add strengthening, quadruped PWR!, golfing       Current " "Problem  1. Unsteadiness on feet        2. Parkinson's disease with dyskinesia and fluctuating manifestations  Follow Up In Physical Therapy      3. Imbalance        4. Gait abnormality                  Subjective      Pt. Reports that nothing is new. Pt. Spouse \"Li\" present during full visit.  He was tired again after he was here last but not as much as the last time.      Precautions: mod fall risk, memory difficulty     Vital Signs: not taken     Pain: 0/10       Objective     Outcome Measures:  TU25=12.5 seconds with no device with SBA    7/10/25=11.8 seconds with no device with SBA, VC to turn fully to chair to sit down  IE=14.5 seconds with no device with shuffling steps, difficulty with turn at SBA     30 second sit<>Stand test:  25= 8 reps unilateral UE support to promote WS anteriorly with no loss of balance  7/10/25=7 reps with SBA, no LOB posteriorly   IE=9 reps with BUE support with min-A, retropulsion with LOB posteriorly for all reps       FGA:  25=22/30  IE=21/30  FGA - Functional Gait Assessment  Gait level surface: 3  Change in gait speed: 3  Gait with horizontal head turns: 2  Gait with vertical head turns: 3  Gait and pivot turn: 2  Step over obstacle: 2  Gait with narrow base of support: 2  Gait with eyes closed: 2  Ambulating backwards: 1  Steps: 2  FGA Total Score: 22       Treatments:    Neuro Re-ed:  -The following exercises were completed at supervision level over 20ft. distance  Gait level surface:  Change in gait speed:  Gait with horizontal head turns:  Gait with vertical head turns:  Gait with pivot turn:  Step over obstacle:  Gait with narrow CLAUS:  Gait with eyes closed:  Ambulating backwards:  Stairs 3-6 inch stairs   Educated pt. On fall risk cut off score.   -backwards ambulation 20'x6 with VC for inc. Arm swing and wider CLAUS    There Act:   -sit<>Stands x8 with unilateral UE support  -functional mobility 20ftx1 with turn negotiation around cone and return to " "seated position    OP EDUCATION:  HEP:  -standing PWR!  -sit<>stands  -daily biking  -backwards walking and stepping over object 1x/day       Goals:  Active       PT Problem       PT Goal 1 STG:Pt. Will spouse will independent in supervising HEP in next 1-2 visits to promote self efficacy, manage symptoms and meet other LTG.  (Met)       Start:  05/22/25    Expected End:  08/16/25    Resolved:  07/10/25         PT Goal 2: STG: Pt. Will improve TUG with <13.5 seconds with no device in order to meet fall risk cut off score (Met)       Start:  05/22/25    Expected End:  08/16/25    Resolved:  07/10/25      Goal Note         TUG=11.8 seconds with no device with SBA, VC to turn fully to chair to sit down  IE=14.5 seconds with no device with shuffling steps, difficulty with turn at SBA              PT Goal 3: STG: Pt. Will improve 30 second sit to stand test by 1 repetitions to meet MCID for significant change compared to normative values and indicate improved functional strength.    (Progressing)       Start:  05/22/25    Expected End:  08/16/25         Goal Note       30 second sit<>Stand test:  8/7/25= 8 reps unilateral UE support to promote WS anteriorly   7/10/25=7 reps with SBA, no LOB posteriorly   IE=9 reps with BUE support with min-A, retropulsion with LOB posteriorly for all reps              PT Goal 4: STG: Pt. will complete FGA in next 1-2 visits (Met)       Start:  05/22/25    Expected End:  08/16/25    Resolved:  07/10/25         PT Goal 5 STG: pt. will complete 6MWT in next 1-2 visits (Met)       Start:  05/22/25    Expected End:  08/16/25    Resolved:  07/10/25         Patient Stated Goal 1 LTG: \"improve walking and balance; improve confidence, wants to be able to golf and go to subway, go to [employer]\" (Progressing)       Start:  05/22/25    Expected End:  08/16/25            PT Goal LTG 2: Pt. Will improve TUG by 3.4 seconds with LRAD in order to meet MCID and indicate improved transfer and gait efficiency. "  (Progressing)       Start:  05/22/25    Expected End:  08/16/25                PT Goal LTG 3: Pt. Will improve 30 second sit to stand test by 2 repetitions to meet MCID for significant change compared to normative values and indicate improved functional strength.    (Progressing)       Start:  05/22/25    Expected End:  08/16/25                PT Goal LTG 4: Pt. Will improve FGA by 4 points in order to meet MCID for significant change compared to normative values and indicate improved dynamic balance.  (Progressing)       Start:  06/12/25    Expected End:  08/16/25

## 2025-08-11 ENCOUNTER — TREATMENT (OUTPATIENT)
Dept: PHYSICAL THERAPY | Facility: CLINIC | Age: 70
End: 2025-08-11
Payer: MEDICARE

## 2025-08-11 DIAGNOSIS — G20.B2 PARKINSON'S DISEASE WITH DYSKINESIA AND FLUCTUATING MANIFESTATIONS: ICD-10-CM

## 2025-08-11 PROCEDURE — 97112 NEUROMUSCULAR REEDUCATION: CPT | Mod: GP | Performed by: PHYSICAL THERAPIST

## 2025-08-11 PROCEDURE — 97110 THERAPEUTIC EXERCISES: CPT | Mod: GP | Performed by: PHYSICAL THERAPIST

## 2025-08-14 ENCOUNTER — TREATMENT (OUTPATIENT)
Dept: PHYSICAL THERAPY | Facility: CLINIC | Age: 70
End: 2025-08-14
Payer: MEDICARE

## 2025-08-14 DIAGNOSIS — R26.9 GAIT ABNORMALITY: Primary | ICD-10-CM

## 2025-08-14 DIAGNOSIS — R26.81 UNSTEADINESS ON FEET: ICD-10-CM

## 2025-08-14 DIAGNOSIS — R26.89 IMBALANCE: ICD-10-CM

## 2025-08-14 DIAGNOSIS — G20.B2 PARKINSON'S DISEASE WITH DYSKINESIA AND FLUCTUATING MANIFESTATIONS: ICD-10-CM

## 2025-08-14 PROCEDURE — 97112 NEUROMUSCULAR REEDUCATION: CPT | Mod: GP | Performed by: PHYSICAL THERAPIST

## 2025-08-20 ENCOUNTER — APPOINTMENT (OUTPATIENT)
Dept: PRIMARY CARE | Facility: CLINIC | Age: 70
End: 2025-08-20
Payer: MEDICARE

## 2025-08-20 VITALS
WEIGHT: 138 LBS | HEIGHT: 67 IN | BODY MASS INDEX: 21.66 KG/M2 | DIASTOLIC BLOOD PRESSURE: 62 MMHG | SYSTOLIC BLOOD PRESSURE: 110 MMHG

## 2025-08-20 DIAGNOSIS — Z13.0 SCREENING FOR DEFICIENCY ANEMIA: ICD-10-CM

## 2025-08-20 DIAGNOSIS — Z12.83 SKIN CANCER SCREENING: ICD-10-CM

## 2025-08-20 DIAGNOSIS — Z12.5 SCREENING FOR PROSTATE CANCER: ICD-10-CM

## 2025-08-20 DIAGNOSIS — G20.B2 PARKINSON'S DISEASE WITH DYSKINESIA AND FLUCTUATING MANIFESTATIONS: ICD-10-CM

## 2025-08-20 DIAGNOSIS — Z13.6 ENCOUNTER FOR SCREENING FOR CORONARY ARTERY DISEASE: ICD-10-CM

## 2025-08-20 DIAGNOSIS — Z00.00 MEDICARE ANNUAL WELLNESS VISIT, SUBSEQUENT: Primary | ICD-10-CM

## 2025-08-20 DIAGNOSIS — Z23 NEED FOR PNEUMOCOCCAL 20-VALENT CONJUGATE VACCINATION: ICD-10-CM

## 2025-08-20 DIAGNOSIS — R63.4 WEIGHT LOSS: ICD-10-CM

## 2025-08-20 DIAGNOSIS — E78.2 MIXED HYPERLIPIDEMIA: ICD-10-CM

## 2025-08-20 DIAGNOSIS — Z12.11 ENCOUNTER FOR SCREENING FOR MALIGNANT NEOPLASM OF COLON: ICD-10-CM

## 2025-08-20 DIAGNOSIS — Z13.6 SCREENING FOR AAA (ABDOMINAL AORTIC ANEURYSM): ICD-10-CM

## 2025-08-20 PROCEDURE — G0009 ADMIN PNEUMOCOCCAL VACCINE: HCPCS | Performed by: INTERNAL MEDICINE

## 2025-08-20 PROCEDURE — 90677 PCV20 VACCINE IM: CPT | Performed by: INTERNAL MEDICINE

## 2025-08-20 PROCEDURE — 99387 INIT PM E/M NEW PAT 65+ YRS: CPT | Performed by: INTERNAL MEDICINE

## 2025-08-20 PROCEDURE — 1160F RVW MEDS BY RX/DR IN RCRD: CPT | Performed by: INTERNAL MEDICINE

## 2025-08-20 PROCEDURE — 1124F ACP DISCUSS-NO DSCNMKR DOCD: CPT | Performed by: INTERNAL MEDICINE

## 2025-08-20 PROCEDURE — G0439 PPPS, SUBSEQ VISIT: HCPCS | Performed by: INTERNAL MEDICINE

## 2025-08-20 PROCEDURE — 99204 OFFICE O/P NEW MOD 45 MIN: CPT | Performed by: INTERNAL MEDICINE

## 2025-08-20 PROCEDURE — 1170F FXNL STATUS ASSESSED: CPT | Performed by: INTERNAL MEDICINE

## 2025-08-20 PROCEDURE — 3008F BODY MASS INDEX DOCD: CPT | Performed by: INTERNAL MEDICINE

## 2025-08-20 PROCEDURE — 1159F MED LIST DOCD IN RCRD: CPT | Performed by: INTERNAL MEDICINE

## 2025-08-20 ASSESSMENT — ACTIVITIES OF DAILY LIVING (ADL)
BATHING: INDEPENDENT
MANAGING_FINANCES: INDEPENDENT
DRESSING: INDEPENDENT
GROCERY_SHOPPING: NEEDS ASSISTANCE
TAKING_MEDICATION: INDEPENDENT
DOING_HOUSEWORK: INDEPENDENT

## 2025-08-20 ASSESSMENT — PATIENT HEALTH QUESTIONNAIRE - PHQ9
2. FEELING DOWN, DEPRESSED OR HOPELESS: NOT AT ALL
1. LITTLE INTEREST OR PLEASURE IN DOING THINGS: NOT AT ALL
SUM OF ALL RESPONSES TO PHQ9 QUESTIONS 1 AND 2: 0

## 2025-08-28 ENCOUNTER — APPOINTMENT (OUTPATIENT)
Dept: RADIOLOGY | Facility: CLINIC | Age: 70
End: 2025-08-28
Payer: MEDICARE

## 2025-08-29 ENCOUNTER — RESULTS FOLLOW-UP (OUTPATIENT)
Dept: PRIMARY CARE | Facility: CLINIC | Age: 70
End: 2025-08-29
Payer: MEDICARE

## 2025-08-29 LAB
ALBUMIN SERPL-MCNC: 4.2 G/DL (ref 3.6–5.1)
ALP SERPL-CCNC: 67 U/L (ref 35–144)
ALT SERPL-CCNC: <3 U/L (ref 9–46)
ANION GAP SERPL CALCULATED.4IONS-SCNC: 9 MMOL/L (CALC) (ref 7–17)
APPEARANCE UR: CLEAR
AST SERPL-CCNC: 10 U/L (ref 10–35)
BACTERIA #/AREA URNS HPF: ABNORMAL /HPF
BILIRUB SERPL-MCNC: 0.6 MG/DL (ref 0.2–1.2)
BILIRUB UR QL STRIP: NEGATIVE
BUN SERPL-MCNC: 30 MG/DL (ref 7–25)
CALCIUM SERPL-MCNC: 9.4 MG/DL (ref 8.6–10.3)
CHLORIDE SERPL-SCNC: 105 MMOL/L (ref 98–110)
CHOLEST SERPL-MCNC: 226 MG/DL
CHOLEST/HDLC SERPL: 4.5 (CALC)
CO2 SERPL-SCNC: 28 MMOL/L (ref 20–32)
COLOR UR: YELLOW
CREAT SERPL-MCNC: 0.74 MG/DL (ref 0.7–1.28)
EGFRCR SERPLBLD CKD-EPI 2021: 97 ML/MIN/1.73M2
ERYTHROCYTE [DISTWIDTH] IN BLOOD BY AUTOMATED COUNT: 13.5 % (ref 11–15)
GLUCOSE SERPL-MCNC: 85 MG/DL (ref 65–99)
GLUCOSE UR QL STRIP: NEGATIVE
HCT VFR BLD AUTO: 47.9 % (ref 38.5–50)
HDLC SERPL-MCNC: 50 MG/DL
HGB BLD-MCNC: 16.1 G/DL (ref 13.2–17.1)
HGB UR QL STRIP: NEGATIVE
HYALINE CASTS #/AREA URNS LPF: ABNORMAL /LPF
KETONES UR QL STRIP: ABNORMAL
LDLC SERPL CALC-MCNC: 152 MG/DL (CALC)
LEUKOCYTE ESTERASE UR QL STRIP: ABNORMAL
MCH RBC QN AUTO: 30.6 PG (ref 27–33)
MCHC RBC AUTO-ENTMCNC: 33.6 G/DL (ref 32–36)
MCV RBC AUTO: 90.9 FL (ref 80–100)
NITRITE UR QL STRIP: NEGATIVE
NONHDLC SERPL-MCNC: 176 MG/DL (CALC)
PH UR STRIP: 5.5 [PH] (ref 5–8)
PLATELET # BLD AUTO: 223 THOUSAND/UL (ref 140–400)
PMV BLD REES-ECKER: 10 FL (ref 7.5–12.5)
POTASSIUM SERPL-SCNC: 5 MMOL/L (ref 3.5–5.3)
PROT SERPL-MCNC: 6.8 G/DL (ref 6.1–8.1)
PROT UR QL STRIP: ABNORMAL
PSA SERPL-MCNC: 1.92 NG/ML
RBC # BLD AUTO: 5.27 MILLION/UL (ref 4.2–5.8)
RBC #/AREA URNS HPF: ABNORMAL /HPF
SERVICE CMNT-IMP: ABNORMAL
SODIUM SERPL-SCNC: 142 MMOL/L (ref 135–146)
SP GR UR STRIP: 1.03 (ref 1–1.03)
SQUAMOUS #/AREA URNS HPF: ABNORMAL /HPF
TRIGL SERPL-MCNC: 121 MG/DL
WBC # BLD AUTO: 5 THOUSAND/UL (ref 3.8–10.8)
WBC #/AREA URNS HPF: ABNORMAL /HPF

## 2025-09-03 ENCOUNTER — APPOINTMENT (OUTPATIENT)
Dept: NEUROLOGY | Facility: CLINIC | Age: 70
End: 2025-09-03
Payer: MEDICARE

## 2025-09-03 ENCOUNTER — TELEPHONE (OUTPATIENT)
Dept: NEUROLOGY | Facility: CLINIC | Age: 70
End: 2025-09-03

## 2025-09-03 ASSESSMENT — UNIFIED PARKINSONS DISEASE RATING SCALE (UPDRS)
FINGER_TAPPING_RIGHT: 1
POSTURAL_TREMOR_RIGHTHAND: 0
LEG_AGILITY_RIGHT: 2
RIGIDITY_LUE: 2
AMPLITUDE_LIP_JAW: 3
POSTURAL_TREMOR_LEFTHAND: 0
PARKINSONS_MEDS: YES
CLINICAL_STATE: ON
PRONATION_SUPINATION_RIGHT: 0
DYSKINESIAS_PRESENT: NO
RIGIDITY_NECK: 0
SPONTANEITY_OF_MOVEMENT: 2
FACIAL_EXPRESSION: 3
RIGIDITY_LLE: 0
CHAIR_RISING_SCALE: 1
TOETAPPING_LEFT: 2
RIGIDITY_RLE: 0
RIGIDITY_RUE: 0
KINETIC_TREMOR_RIGHTHAND: 1
HANDMOVEMENTS_RIGHT: 0
CONSTANCY_TREMOR_ATREST: 4
POSTURE: 2
FINGER_TAPPING_LEFT: 2
AMPLITUDE_LUE: 0
KINETIC_TREMOR_LEFTHAND: 0
LEVODOPA: YES
PRONATION_SUPINATION_LEFT: 1
POSTURAL_STABILITY: 0
LEG_AGILITY_LEFT: 3
SPEECH: 2
AMPLITUDE_LLE: 0
TOETAPPING_RIGHT: 1
GAIT: 2
AMPLITUDE_RUE: 1
AMPLITUDE_RLE: 0
FREEZING_GAIT: 0
TOTAL_SCORE: 36

## 2025-09-03 ASSESSMENT — ENCOUNTER SYMPTOMS
DEPRESSION: 0
OCCASIONAL FEELINGS OF UNSTEADINESS: 0
LOSS OF SENSATION IN FEET: 0

## 2025-09-04 ENCOUNTER — HOSPITAL ENCOUNTER (OUTPATIENT)
Dept: RADIOLOGY | Facility: CLINIC | Age: 70
Discharge: HOME | End: 2025-09-04
Payer: MEDICARE

## 2025-09-04 DIAGNOSIS — Z13.6 SCREENING FOR AAA (ABDOMINAL AORTIC ANEURYSM): ICD-10-CM

## 2025-09-04 PROCEDURE — 76706 US ABDL AORTA SCREEN AAA: CPT

## 2025-09-04 PROCEDURE — 76706 US ABDL AORTA SCREEN AAA: CPT | Performed by: RADIOLOGY

## 2025-09-08 ENCOUNTER — APPOINTMENT (OUTPATIENT)
Dept: PSYCHOLOGY | Facility: HOSPITAL | Age: 70
End: 2025-09-08
Payer: MEDICARE

## 2025-10-22 ENCOUNTER — APPOINTMENT (OUTPATIENT)
Dept: NEUROLOGY | Facility: CLINIC | Age: 70
End: 2025-10-22
Payer: MEDICARE

## 2026-08-20 ENCOUNTER — APPOINTMENT (OUTPATIENT)
Dept: PRIMARY CARE | Facility: CLINIC | Age: 71
End: 2026-08-20
Payer: MEDICARE

## (undated) PROCEDURE — 00H03MZ INSERTION OF NEUROSTIMULATOR LEAD INTO BRAIN, PERCUTANEOUS APPROACH: ICD-10-PCS

## (undated) PROCEDURE — 0JH60DZ INSERTION OF MULTIPLE ARRAY STIMULATOR GENERATOR INTO CHEST SUBCUTANEOUS TISSUE AND FASCIA, OPEN APPROACH: ICD-10-PCS

## (undated) DEVICE — DRESSING, TRANSPARENT, TEGADERM, 2-3/8 X 2-3/4 IN

## (undated) DEVICE — DRAPE PACK, CRANIOTOMY, CUSTOM, UHC

## (undated) DEVICE — TOWEL, SURGICAL, NEURO, O/R, 16 X 26, BLUE, STERILE

## (undated) DEVICE — SENSIGHT EXTENSION TUNNELER KIT

## (undated) DEVICE — CATHETER, IV, ANGIOCATH, 14 G X 1.88 IN, FEP POLYMER

## (undated) DEVICE — COVER HANDLE LIGHT, STERIS, BLUE, STERILE

## (undated) DEVICE — SPONGE, GAUZE, XRAY DECT, 16 PLY, 4 X 4, W/MASTER DMT,STERILE

## (undated) DEVICE — PAD, GROUNDING, ELECTROSURGICAL, W/9 FT CABLE, POLYHESIVE II, ADULT, LF

## (undated) DEVICE — MARKER, SKIN, DUAL TIP INK W/9 LABEL AND REMOVABLE TIME OUT SLEEVE

## (undated) DEVICE — GLOVE, SURGICAL, PROTEXIS PI BLUE W/NEUTHERA, 6.5, PF, LF

## (undated) DEVICE — TRAY, SURESTEP, URINE METER, 16FR, COMPLETE, W/STATLOCK

## (undated) DEVICE — DRAPE, TIBURON, SPLIT SHEET, REINF ADH STRIP, 77X122

## (undated) DEVICE — GOWN, SURGICAL, SMARTGOWN, LARGE, STERILE

## (undated) DEVICE — HANDSET WITH COMMUNICATOR

## (undated) DEVICE — CANNULA, MICROTARGETING, AT-TARGET

## (undated) DEVICE — WOUND SYSTEM, DEBRIDEMENT & CLEANING, O.R DUOPAK

## (undated) DEVICE — APPLICATOR, CHLORAPREP, W/ORANGE TINT, 26ML

## (undated) DEVICE — CORD, CAUTERY, BIOPOLAR FORCEP, 12FT

## (undated) DEVICE — SUTURE, SILK, 2-0, 18 IN, BLACK

## (undated) DEVICE — SYRINGE, 10 CC, LUER LOCK

## (undated) DEVICE — SENSIGHT LEAD TEST CABLE KIT

## (undated) DEVICE — SUTURE, POLYSORB, 2-0, 18 IN, GS23, DETACHABLE, MULTIPACK, UNDYED

## (undated) DEVICE — DRAPE, ISOLATION, ANTIMICROBIAL, W/POUCH, IOBAN 2, STERI DRAPE, 125 X 83 IN, DISPOSABLE, STERILE

## (undated) DEVICE — SUTURE, VICRYL RAPIDE 4-0, PS-2, 3/8 CIRCLE, UNDYED, BRAIDED, 27"

## (undated) DEVICE — GLOVE, SURGICAL, PROTEXIS PI MICRO, 6.5, PF, LF

## (undated) DEVICE — DRILL, TWIST, AO, 3.2 X 195, SS, STERILE

## (undated) DEVICE — DRESSING, GAUZE, PETROLATUM, PATCH, XEROFORM, 1 X 8 IN, STERILE

## (undated) DEVICE — COVER, BACK TABLE, 65 X 90, HVY REINFORCED